# Patient Record
Sex: FEMALE | Race: WHITE | NOT HISPANIC OR LATINO | Employment: STUDENT | URBAN - METROPOLITAN AREA
[De-identification: names, ages, dates, MRNs, and addresses within clinical notes are randomized per-mention and may not be internally consistent; named-entity substitution may affect disease eponyms.]

---

## 2018-04-16 ENCOUNTER — TELEPHONE (OUTPATIENT)
Dept: ALLERGY | Facility: CLINIC | Age: 28
End: 2018-04-16

## 2018-04-16 ENCOUNTER — OFFICE VISIT (OUTPATIENT)
Dept: FAMILY MEDICINE | Facility: CLINIC | Age: 28
End: 2018-04-16
Payer: COMMERCIAL

## 2018-04-16 ENCOUNTER — OFFICE VISIT (OUTPATIENT)
Dept: ALLERGY | Facility: CLINIC | Age: 28
End: 2018-04-16
Payer: COMMERCIAL

## 2018-04-16 VITALS
TEMPERATURE: 97.2 F | HEART RATE: 80 BPM | WEIGHT: 171.6 LBS | SYSTOLIC BLOOD PRESSURE: 104 MMHG | DIASTOLIC BLOOD PRESSURE: 74 MMHG | BODY MASS INDEX: 27.7 KG/M2

## 2018-04-16 VITALS
OXYGEN SATURATION: 100 % | TEMPERATURE: 98 F | HEIGHT: 67 IN | WEIGHT: 166.23 LBS | DIASTOLIC BLOOD PRESSURE: 70 MMHG | RESPIRATION RATE: 16 BRPM | HEART RATE: 86 BPM | SYSTOLIC BLOOD PRESSURE: 116 MMHG | BODY MASS INDEX: 26.09 KG/M2

## 2018-04-16 DIAGNOSIS — J01.90 ACUTE SINUSITIS WITH SYMPTOMS > 10 DAYS: ICD-10-CM

## 2018-04-16 DIAGNOSIS — H60.391 INFECTIVE OTITIS EXTERNA, RIGHT: ICD-10-CM

## 2018-04-16 DIAGNOSIS — J45.20 MILD INTERMITTENT ASTHMA WITHOUT COMPLICATION: Primary | ICD-10-CM

## 2018-04-16 DIAGNOSIS — Z91.040 LATEX SENSITIVITY: ICD-10-CM

## 2018-04-16 DIAGNOSIS — B37.31 CANDIDIASIS OF VAGINA: ICD-10-CM

## 2018-04-16 DIAGNOSIS — Z72.0 TOBACCO ABUSE: ICD-10-CM

## 2018-04-16 DIAGNOSIS — Z88.0 HISTORY OF PENICILLIN ALLERGY: ICD-10-CM

## 2018-04-16 DIAGNOSIS — J30.89 ALLERGIC RHINITIS DUE TO DUST MITE: ICD-10-CM

## 2018-04-16 DIAGNOSIS — J01.01 ACUTE RECURRENT MAXILLARY SINUSITIS: ICD-10-CM

## 2018-04-16 DIAGNOSIS — J30.1 CHRONIC SEASONAL ALLERGIC RHINITIS DUE TO POLLEN: ICD-10-CM

## 2018-04-16 DIAGNOSIS — L50.9 URTICARIA: ICD-10-CM

## 2018-04-16 DIAGNOSIS — H10.13 ALLERGIC CONJUNCTIVITIS, BILATERAL: ICD-10-CM

## 2018-04-16 DIAGNOSIS — J30.81 CHRONIC ALLERGIC RHINITIS DUE TO ANIMAL HAIR AND DANDER: ICD-10-CM

## 2018-04-16 DIAGNOSIS — J45.20 MILD INTERMITTENT ASTHMA WITHOUT COMPLICATION: ICD-10-CM

## 2018-04-16 DIAGNOSIS — J30.2 SEASONAL ALLERGIC RHINITIS, UNSPECIFIED CHRONICITY, UNSPECIFIED TRIGGER: ICD-10-CM

## 2018-04-16 DIAGNOSIS — T78.1XXA REACTION TO FOOD, INITIAL ENCOUNTER: ICD-10-CM

## 2018-04-16 DIAGNOSIS — H69.93 DYSFUNCTION OF BOTH EUSTACHIAN TUBES: Primary | ICD-10-CM

## 2018-04-16 LAB
FEF 25/75: NORMAL
FEV-1: NORMAL
FEV1/FVC: NORMAL
FVC: NORMAL

## 2018-04-16 PROCEDURE — 95004 PERQ TESTS W/ALRGNC XTRCS: CPT | Performed by: ALLERGY & IMMUNOLOGY

## 2018-04-16 PROCEDURE — 99214 OFFICE O/P EST MOD 30 MIN: CPT | Performed by: NURSE PRACTITIONER

## 2018-04-16 PROCEDURE — 94010 BREATHING CAPACITY TEST: CPT | Performed by: ALLERGY & IMMUNOLOGY

## 2018-04-16 PROCEDURE — 99244 OFF/OP CNSLTJ NEW/EST MOD 40: CPT | Mod: 25 | Performed by: ALLERGY & IMMUNOLOGY

## 2018-04-16 RX ORDER — DOXYCYCLINE 100 MG/1
100 CAPSULE ORAL 2 TIMES DAILY
Qty: 14 CAPSULE | Refills: 0 | Status: SHIPPED | OUTPATIENT
Start: 2018-04-16 | End: 2019-02-25

## 2018-04-16 RX ORDER — EPINEPHRINE 0.3 MG/.3ML
0.3 INJECTION SUBCUTANEOUS PRN
Qty: 0.6 ML | Refills: 2 | Status: SHIPPED | OUTPATIENT
Start: 2018-04-16 | End: 2018-04-16

## 2018-04-16 RX ORDER — COPPER 313.4 MG/1
1 INTRAUTERINE DEVICE INTRAUTERINE ONCE
Qty: 1 EACH | COMMUNITY
Start: 2018-04-16 | End: 2018-09-27

## 2018-04-16 RX ORDER — AZELASTINE HYDROCHLORIDE 0.5 MG/ML
1 SOLUTION/ DROPS OPHTHALMIC 2 TIMES DAILY PRN
Qty: 1 BOTTLE | Refills: 1 | Status: SHIPPED | OUTPATIENT
Start: 2018-04-16 | End: 2018-06-06

## 2018-04-16 RX ORDER — OFLOXACIN 3 MG/ML
10 SOLUTION AURICULAR (OTIC) DAILY
Qty: 4 ML | Refills: 0 | Status: SHIPPED | OUTPATIENT
Start: 2018-04-16 | End: 2019-02-25

## 2018-04-16 RX ORDER — OFLOXACIN 3 MG/ML
10 SOLUTION AURICULAR (OTIC) 2 TIMES DAILY
Qty: 10 ML | Refills: 0 | Status: SHIPPED | OUTPATIENT
Start: 2018-04-16 | End: 2018-04-16

## 2018-04-16 RX ORDER — ALBUTEROL SULFATE 90 UG/1
2 AEROSOL, METERED RESPIRATORY (INHALATION) EVERY 4 HOURS PRN
Qty: 1 INHALER | Refills: 0 | Status: SHIPPED | OUTPATIENT
Start: 2018-04-16 | End: 2018-06-06

## 2018-04-16 RX ORDER — LORATADINE 10 MG/1
10 TABLET ORAL DAILY PRN
Qty: 30 TABLET | Refills: 3 | Status: SHIPPED | OUTPATIENT
Start: 2018-04-16 | End: 2020-06-30 | Stop reason: ALTCHOICE

## 2018-04-16 RX ORDER — FLUCONAZOLE 150 MG/1
150 TABLET ORAL ONCE
Qty: 1 TABLET | Refills: 0 | Status: SHIPPED | OUTPATIENT
Start: 2018-04-16 | End: 2018-04-16

## 2018-04-16 RX ORDER — EPINEPHRINE 0.3 MG/.3ML
0.3 INJECTION SUBCUTANEOUS PRN
Qty: 0.6 ML | Refills: 1 | Status: SHIPPED | OUTPATIENT
Start: 2018-04-16 | End: 2021-11-02

## 2018-04-16 ASSESSMENT — ENCOUNTER SYMPTOMS
COUGH: 1
CHILLS: 1
HEADACHES: 1
ARTHRALGIAS: 0
ACTIVITY CHANGE: 0
ADENOPATHY: 0
EYE REDNESS: 1
EYE ITCHING: 1
NAUSEA: 1
FEVER: 1
WHEEZING: 1
JOINT SWELLING: 1
DIARRHEA: 0
RHINORRHEA: 1
CHEST TIGHTNESS: 0
SHORTNESS OF BREATH: 1
VOMITING: 0
FACIAL SWELLING: 1
SINUS PRESSURE: 1
MYALGIAS: 1
EYE DISCHARGE: 1

## 2018-04-16 NOTE — MR AVS SNAPSHOT
After Visit Summary   4/16/2018    hSerley Quijano    MRN: [id number]           Patient Information     Date Of Birth          1990        Visit Information        Provider Department      4/16/2018 12:00 PM Nj Rosenberg MD [facility]        Today's Diagnoses     Mild intermittent asthma without complication    -  1    Chronic seasonal allergic rhinitis due to pollen        Chronic allergic rhinitis due to animal hair and dander        Allergic rhinitis due to dust mite        Allergic conjunctivitis, bilateral        Urticaria        Latex sensitivity        Reaction to food, initial encounter        Infective otitis externa, right        History of penicillin allergy        Acute recurrent maxillary sinusitis          Care Instructions     Get the blood work done.  -Start Arnuity 100 mcg 1 puff once daily.  -Start using albuterol inhaler 2-4 puffs every 4 hours as needed for persistent cough/chest tightness/wheezing/shortness of breath. Use it with chamber device.  -Start Qnasl 80 mcg 2 puffs in each nostril once daily.  -Start Optivar 1 drop in each eye twice daily as needed.  -Start Floxin eardrops 10 drops in the right ear once daily for 7 days.  -Start antibiotics as prescribed.  -if you have hives that are not well controlled, you may take Claritin 10 mg by mouth daily as needed.    The time elapsed since the last reaction is important because penicillin-specific IgE antibodies decrease over time, and therefore patients with recent reactions are more likely to be allergic than patients with distant reactions. Approximately 80 percent of patients with IgE-mediated penicillin allergy loose the sensitivity after 10 years.  -Recommend penicillin testing with subsequent amoxicillin oral challenge in office settings if the test is negative.    AEROALLERGEN AVOIDANCE INSTRUCTIONS  MOLD  Indoors, mold season is year round. Outdoors, most mold prefer seasons with high humidity. Mold  prefers damp, dark, warm places. Here are some tips on how to avoid mold exposure.  1.  Keep humidity inside between 35-50% with air conditioning or dehumidifier. The humidity level can be checked with a meter from a hardware store.   2.  Clean surfaces where mold grows and dry wet areas.  3.  Avoid steam cleaning carpets and discard moldy belongings.  4.  Wear a mask when doing yard work and refrain from walking through uncut fields or playing in leaves.  5.  Minimize use of potted plants and do not keep them indoors.  6.  Consider an allergy cover for the pillow and mattress.  POLLEN  Pollens are the tiny airborne particles given off by trees, weeds, and grasses. They can be the cause of seasonal allergic rhinitis or hay fever symptoms, which include stuffy, itchy, runny nose, redness, swelling and itching of the eyes, and itching of the ears and throat. Here are some tips on how to avoid pollen exposure.  1. .Keep windows closed and use the air conditioner when possible.  2.  Avoid outside exposure in the early morning as pollen counts are highest at that time.  3.  Take a shower and wash hair each night.  4.  Consider wearing a mask when working in the yard and/or garden.  5.  Clean furnace filter monthly with HEPA filters. Consider a HEPA filter vacuum  which will prevent pollen from being reintroduced into the air.   DUST MITES  Dust mites can never be entirely eliminated in the house no matter how clean your house is. Dust mites are attracted to warm, moist areas and feed on dead skin flakes. Here are tips to minimize dust mites in your home.  1.  Encase pillows and mattress/box springs in zippered allergy covers.  2.  Wash bedding in hot water (at least 130 F) every 7-14 days.  3.  Avoid curtains, carpet, and upholstered furniture if possible.  4.  Use HEPA air filters and a HEPA filter vacuum . Change filters monthly. Vacuum weekly.  5.  Keep bedroom simple, avoiding clutter, so it can quickly  be dusted.  6.  Cover heating vents with vent filters.  7.  Keep stuffed toys in a closed container and wash or freeze regularly.  8.  Keep clothing in the closet with the door closed.  PETS  Pets present many problems for people with allergies. Dander from pets is very difficult to remove and also is a food source for dust mites.  1.  If possible, find the pet a new home.  2.  If not possible, keep the pet outdoors. Never allow the pet into the bedroom.  3.  Wash pet weekly in warm water.  4.  Encase mattresses, pillows, and box springs in allergen-proof covers.  5.  Use HEPA air filters and a HEPA filter vacuum . Change filters monthly.        Using an Inhaler with a Spacer  To control asthma, you need to use your medicines the right way. Some medicines are inhaled using a device called a metered-dose inhaler (MDI). Metered-dose inhalers deliver medicine with a fine spray. You may be asked to use a spacer (holding tube) with your inhaler. The spacer helps make sure all the medicine you need goes into your lungs.   Steps for using an inhaler with a spacer  Step 1:    Remove the caps from the inhaler and spacer.    Shake the inhaler well and attach the spacer. If the inhaler is being used for the first time or has not been used for a while, prime it as directed by the product maker.  Step 2:    Breathe out normally.    Put the spacer between your teeth. Close your lips tightly around it.    Keep your chin up.  Step 3:    Spray 1 puff into the spacer by pressing down on the inhaler.    Then breathe in through your mouth as slowly and deeply as you can. This should take about 5-10 seconds. If you breathe too quickly, you may hear a whistling sound in certain spacers.  Step 4:    Take the spacer out of your mouth.    Hold your breath for a count of 10.    Then hold your lips together and slowly breathe out through your mouth.          If you re prescribed more than 1 puff of medicine at a time, wait at least 30  seconds between puffs. This number may be different for different medicines. Shake the inhaler again. Then repeat steps 2 to 4.   Date Last Reviewed: 10/1/2016    3405-6514 [employer], [employer]. [address] [place] [address], [place] [address]. All rights reserved. This information is not intended as a substitute for professional medical care. Always follow your healthcare professional's instructions.      Anaphylaxis Action Plan for Immunotherapy Patients    There is risk of systemic reactions when receiving immunotherapy injections. Local reactions such as a wheal (hive) smaller than a quarter, redness, swelling, and soreness are common. If the wheal is greater than the size of a half dollar (3.4 cm) please contact our clinic (numbers below), as we will need to adjust the dose that you receive at your next injection. Waiting until the next appointment to inform us of the reaction could increase the wait time that you experience, because your allergist will need to be contacted for new orders prior to giving the injection.  If you have symptoms not localized to the injection site, please follow the anaphylaxis plan, and contact our office to update after you have received emergency medical treatment. Please ask to speak to an Allergy RN with any questions or updates.     [facility]: [phone]  [facility]: [phone]  [facility]: [phone]  [place]: [phone]  [phone]                Follow-ups after your visit        Follow-up notes from your care team     Return in about 6 weeks (around 2018), or if symptoms worsen or fail to improve, for rhinitis follow up, asthma follow up, food allergy follow up, hives follow up.      Your next 10 appointments already scheduled     2018 12:15 PM CDT   New Visit with Rogers Nevarez MD   [facility] ([facility])    [address] [id number] [phone]              Future tests  "that were ordered for you today     Open Future Orders        Priority Expected Expires Ordered    Allergen horse dander IgE Routine  4/16/2019 4/16/2018    Allergen dog epithelium IgE Routine  4/16/2019 4/16/2018    Allergen latex IgE Routine  4/16/2019 4/16/2018    Allergen Food Pineapple IgE Routine  4/16/2019 4/16/2018    Allergen Eggplant IgE Routine  4/16/2019 4/16/2018    Allergen Food Kiwi IgE Routine  4/16/2019 4/16/2018            Who to contact     If you have questions or need follow up information about today's clinic visit or your schedule please contact [facility] directly at [phone].  Normal or non-critical lab and imaging results will be communicated to you by MyChart, letter or phone within 4 business days after the clinic has received the results. If you do not hear from us within 7 days, please contact the clinic through [employer] or phone. If you have a critical or abnormal lab result, we will notify you by phone as soon as possible.  Submit refill requests through [employer] or call your pharmacy and they will forward the refill request to us. Please allow 3 business days for your refill to be completed.          Additional Information About Your Visit        [employer] Information     [employer] gives you secure access to your electronic health record. If you see a primary care provider, you can also send messages to your care team and make appointments. If you have questions, please call your primary care clinic.  If you do not have a primary care provider, please call [phone] and they will assist you.        Care EveryWhere ID     This is your Care EveryWhere ID. This could be used by other organizations to access your [place] medical records  [id number]        Your Vitals Were     Pulse Temperature Respirations Height Pulse Oximetry BMI (Body Mass Index)    86 98  F (36.7  C) (Oral) 16 1.69 m (5' 6.53\") 100% 26.4 kg/m2       Blood Pressure from Last 3 Encounters:   04/16/18 " 116/70   04/16/18 104/74   10/18/16 113/66    Weight from Last 3 Encounters:   04/16/18 75.4 kg (166 lb 3.6 oz)   04/16/18 77.8 kg (171 lb 9.6 oz)   10/18/16 80.9 kg (178 lb 6.4 oz)              We Performed the Following     ALLERGY SKIN TESTS,ALLERGENS     OPTICHAMBER     Spirometry, Breathing Capacity          Today's Medication Changes          These changes are accurate as of 4/16/18  1:56 PM.  If you have any questions, ask your nurse or doctor.               Start taking these medicines.        Dose/Directions    azelastine 0.05 % Soln ophthalmic solution   Commonly known as:  OPTIVAR   Used for:  Allergic conjunctivitis, bilateral   Started by:  Nj Rosenberg MD        Dose:  1 drop   Apply 1 drop to eye 2 times daily as needed   Quantity:  1 Bottle   Refills:  1       Beclomethasone Dipropionate 80 MCG/ACT Aers Nasal Spray   Commonly known as:  QNASL   Used for:  Chronic seasonal allergic rhinitis due to pollen, Chronic allergic rhinitis due to animal hair and dander, Allergic rhinitis due to dust mite   Started by:  Nj Rosenberg MD        Dose:  2 spray   Spray 2 sprays into both nostrils daily   Quantity:  8.7 g   Refills:  1       doxycycline monohydrate 100 MG capsule   Used for:  Acute sinusitis with symptoms > 10 days   Started by:  Cyndee Rizzo APRN CNP        Dose:  100 mg   Take 1 capsule (100 mg) by mouth 2 times daily for 7 days   Quantity:  14 capsule   Refills:  0       EPINEPHrine 0.3 MG/0.3ML injection 2-pack   Commonly known as:  AUVI-Q   Used for:  Chronic allergic rhinitis due to animal hair and dander, Chronic seasonal allergic rhinitis due to pollen, Mild intermittent asthma without complication, Latex sensitivity, Reaction to food, initial encounter   Started by:  Nj Rosenberg MD        Dose:  0.3 mg   Inject 0.3 mLs (0.3 mg) into the muscle as needed for anaphylaxis   Quantity:  0.6 mL   Refills:  1       fluticasone furoate 100 MCG/ACT Aepb inhalation powder    Commonly known as:  ARNUITY ELLIPTA   Used for:  Mild intermittent asthma without complication   Started by:  Nj Rosenberg MD        Dose:  1 puff   Inhale 1 puff into the lungs daily   Quantity:  1 each   Refills:  3       ofloxacin 0.3 % otic solution   Commonly known as:  FLOXIN   Used for:  Infective otitis externa, right   Started by:  Nj Rosenberg MD        Dose:  10 drop   Place 10 drops into the right ear daily for 7 days   Quantity:  4 mL   Refills:  0         These medicines have changed or have updated prescriptions.        Dose/Directions    loratadine 10 MG tablet   Commonly known as:  CLARITIN   This may have changed:    - when to take this  - reasons to take this   Used for:  Urticaria   Changed by:  Nj Rosenberg MD        Dose:  10 mg   Take 1 tablet (10 mg) by mouth daily as needed for allergies   Quantity:  30 tablet   Refills:  3       * PROAIR  (90 Base) MCG/ACT Inhaler   This may have changed:  Another medication with the same name was added. Make sure you understand how and when to take each.   Used for:  Mild intermittent asthma   Generic drug:  albuterol   Changed by:  Nj Rosenberg MD        Dose:  2 puff   Inhale 2 puffs into the lungs 4 times daily as needed.   Quantity:  3 Inhaler   Refills:  3       * albuterol 1.25 MG/3ML nebulizer solution   Commonly known as:  ACCUNEB   This may have changed:  Another medication with the same name was added. Make sure you understand how and when to take each.   Used for:  Asthma with acute exacerbation   Changed by:  jN Rosenberg MD        Dose:  1 ampule   Take 3 mLs by nebulization every 4 hours as needed for shortness of breath / dyspnea.   Quantity:  1 Box   Refills:  3       * albuterol 108 (90 Base) MCG/ACT Inhaler   Commonly known as:  PROAIR HFA/PROVENTIL HFA/VENTOLIN HFA   This may have changed:  You were already taking a medication with the same name, and this prescription was added. Make sure you understand how and  when to take each.   Used for:  Mild intermittent asthma without complication   Changed by:  Nj Rosenberg MD        Dose:  2 puff   Inhale 2 puffs into the lungs every 4 hours as needed for shortness of breath / dyspnea or wheezing   Quantity:  1 Inhaler   Refills:  0       * Notice:  This list has 3 medication(s) that are the same as other medications prescribed for you. Read the directions carefully, and ask your doctor or other care provider to review them with you.      Stop taking these medicines if you haven't already. Please contact your care team if you have questions.     [place] 610 MG Caps   Stopped by:  Nj Rosenberg MD           metoclopramide 10 MG tablet   Commonly known as:  REGLAN   Stopped by:  Cyndee Rizzo APRN CNP           mometasone 0.1 % cream   Commonly known as:  ELOCON   Stopped by:  Cyndee Rizzo APRN CNP           Prenatal Vitamins 28-0.8 MG Tabs   Stopped by:  Cyndee Rizzo APRN CNP                Where to get your medicines      These medications were sent to [facility], [place] [address]     Phone:  [phone]     EPINEPHrine 0.3 MG/0.3ML injection 2-pack         These medications were sent to [facility] [address] [facility]  [address] [facility] [address]     Phone:  [phone]     loratadine 10 MG tablet         These medications were sent to [facility] [address] [place] [address]     Phone:  [phone]     albuterol 108 (90 Base) MCG/ACT Inhaler    azelastine 0.05 % Soln ophthalmic solution    Beclomethasone Dipropionate 80 MCG/ACT Aers Nasal Spray    doxycycline monohydrate 100 MG capsule    fluticasone furoate 100 MCG/ACT Aepb inhalation powder    ofloxacin 0.3 % otic solution                Primary Care Provider Office Phone # Fax #    Mya Mcmahon,  [phone]       [address]  [facility] [address]        Equal Access to Services     JOEYVIRI JORDYN : Hadsanto aad ku hadchelitarobert Valente, wabalwinderda thijozefha, artista kadouglasda sean, kimi stephaniein hayaaerendira kapoorluis alberto reina laDavgregoria mendez. So [facility] [phone].    ATENCIÓN: Si habla español, tiene a roa disposición servicios gratuitos de asistencia lingüística. Melidaame al [phone].    We comply with applicable federal civil rights laws and [place] laws. We do not discriminate on the basis of race, color, national origin, age, disability, sex, sexual orientation, or gender identity.            Thank you!     Thank you for choosing [facility]  for your care. Our goal is always to provide you with excellent care. Hearing back from our patients is one way we can continue to improve our services. Please take a few minutes to complete the written survey that you may receive in the mail after your visit with us. Thank you!             Your Updated Medication List - Protect others around you: Learn how to safely use, store and throw away your medicines at www.disposemymeds.org.          This list is accurate as of 4/16/18  1:56 PM.  Always use your most recent med list.                   Brand Name Dispense Instructions for use Diagnosis    azelastine 0.05 % Soln ophthalmic solution    OPTIVAR    1 Bottle    Apply 1 drop to eye 2 times daily as needed    Allergic conjunctivitis, bilateral       Beclomethasone Dipropionate 80 MCG/ACT Aers Nasal Spray    QNASL    8.7 g    Spray 2 sprays into both nostrils daily    Chronic seasonal allergic rhinitis due to pollen, Chronic allergic rhinitis due to animal hair and dander, Allergic rhinitis due to dust mite       doxycycline monohydrate 100 MG capsule     14 capsule    Take 1 capsule (100 mg) by mouth 2 times daily for 7 days    Acute sinusitis with symptoms > 10 days       EPINEPHrine 0.3 MG/0.3ML injection 2-pack    AUVI-Q    0.6 mL    Inject 0.3 mLs (0.3 mg)  into the muscle as needed for anaphylaxis    Chronic allergic rhinitis due to animal hair and dander, Chronic seasonal allergic rhinitis due to pollen, Mild intermittent asthma without complication, Latex sensitivity, Reaction to food, initial encounter       fluticasone furoate 100 MCG/ACT Aepb inhalation powder    ARNUITY ELLIPTA    1 each    Inhale 1 puff into the lungs daily    Mild intermittent asthma without complication       loratadine 10 MG tablet    CLARITIN    30 tablet    Take 1 tablet (10 mg) by mouth daily as needed for allergies    Urticaria       ofloxacin 0.3 % otic solution    FLOXIN    4 mL    Place 10 drops into the right ear daily for 7 days    Infective otitis externa, right       paragard intrauterine copper     1 each    1 each by Intrauterine route once for 1 dose        * PROAIR  (90 Base) MCG/ACT Inhaler   Generic drug:  albuterol     3 Inhaler    Inhale 2 puffs into the lungs 4 times daily as needed.    Mild intermittent asthma       * albuterol 1.25 MG/3ML nebulizer solution    ACCUNEB    1 Box    Take 3 mLs by nebulization every 4 hours as needed for shortness of breath / dyspnea.    Asthma with acute exacerbation       * albuterol 108 (90 Base) MCG/ACT Inhaler    PROAIR HFA/PROVENTIL HFA/VENTOLIN HFA    1 Inhaler    Inhale 2 puffs into the lungs every 4 hours as needed for shortness of breath / dyspnea or wheezing    Mild intermittent asthma without complication       * Notice:  This list has 3 medication(s) that are the same as other medications prescribed for you. Read the directions carefully, and ask your doctor or other care provider to review them with you.

## 2018-04-16 NOTE — MR AVS SNAPSHOT
After Visit Summary   4/16/2018    Sherley Quijano    MRN: [id number]           Patient Information     Date Of Birth          1990        Visit Information        Provider Department      4/16/2018 9:20 AM Cyndee Rizzo APRN [facility]        Today's Diagnoses     Dysfunction of both eustachian tubes    -  1    Mild intermittent asthma without complication        Seasonal allergic rhinitis, unspecified chronicity, unspecified trigger        Acute sinusitis with symptoms > 10 days        Candidiasis of vagina          Care Instructions    ENT and asthma/allergy referrals made    Doxycycline sent to the pharmacy for symptoms    Diflucan as needed for yeast infection associated with antibiotic use    Follow up if symptoms do not improve or worsen.            Follow-ups after your visit        Additional Services     ALLERGY/ASTHMA ADULT REFERRAL       Your provider [place] you [place]: [facility]: [facility] [phone] [email or web address]    Please be aware that coverage of these services is subject to the terms and limitations of your health insurance plan.  Call member services at your health plan with any benefit or coverage questions.      Please bring the following with you to your appointment:    (1) Any X-Rays, CTs or MRIs which have been performed.  Contact the facility where they were done to arrange for  prior to your scheduled appointment.    (2) List of current medications  (3) This referral request   (4) Any documents/labs given to you for this referral            OTOLARYNGOLOGY REFERRAL       Your provider has referred you to: [facility] [place]: [facility] [phone]   [email or web address]    Please be aware that coverage of these services is subject to the terms and limitations of your health insurance plan.  Call member services at your health plan with any benefit or  coverage questions.      Please bring the following with you to your appointment:    (1) Any X-Rays, CTs or MRIs which have been performed.  Contact the facility where they were done to arrange for  prior to your scheduled appointment.   (2) List of current medications  (3) This referral request   (4) Any documents/labs given to you for this referral                  Who to contact     If you have questions or need follow up information about today's clinic visit or your schedule please contact [facility] directly at [phone].  Normal or non-critical lab and imaging results will be communicated to you by [employer], letter or phone within 4 business days after the clinic has received the results. If you do not hear from us within 7 days, please contact the clinic through [employer] or phone. If you have a critical or abnormal lab result, we will notify you by phone as soon as possible.  Submit refill requests through [employer] or call your pharmacy and they will forward the refill request to us. Please allow 3 business days for your refill to be completed.          Additional Information About Your Visit        [employer] Information     [employer] gives you secure access to your electronic health record. If you see a primary care provider, you can also send messages to your care team and make appointments. If you have questions, please call your primary care clinic.  If you do not have a primary care provider, please call [phone] and they will assist you.        Care EveryWhere ID     This is your Care EveryWhere ID. This could be used by other organizations to access your [place] medical records  [id number]        Your Vitals Were     Pulse Temperature BMI (Body Mass Index)             80 97.2  F (36.2  C) (Tympanic) 27.7 kg/m2          Blood Pressure from Last 3 Encounters:   04/16/18 104/74   10/18/16 113/66   09/22/16 119/70    Weight from Last 3 Encounters:   04/16/18 171 lb 9.6 oz  (77.8 kg)   10/18/16 178 lb 6.4 oz (80.9 kg)   09/22/16 176 lb 12.8 oz (80.2 kg)              We Performed the Following     ALLERGY/ASTHMA ADULT REFERRAL     OTOLARYNGOLOGY REFERRAL          Today's Medication Changes          These changes are accurate as of 4/16/18 10:00 AM.  If you have any questions, ask your nurse or doctor.               Start taking these medicines.        Dose/Directions    doxycycline monohydrate 100 MG capsule   Used for:  Acute sinusitis with symptoms > 10 days   Started by:  Cyndee Rizzo APRN CNP        Dose:  100 mg   Take 1 capsule (100 mg) by mouth 2 times daily for 7 days   Quantity:  14 capsule   Refills:  0       fluconazole 150 MG tablet   Commonly known as:  DIFLUCAN   Used for:  Candidiasis of vagina   Started by:  Cyndee Rizzo APRN CNP        Dose:  150 mg   Take 1 tablet (150 mg) by mouth once for 1 dose   Quantity:  1 tablet   Refills:  0         Stop taking these medicines if you haven't already. Please contact your care team if you have questions.     loratadine 10 MG tablet   Commonly known as:  CLARITIN   Stopped by:  Cyndee Rizzo APRN CNP           metoclopramide 10 MG tablet   Commonly known as:  REGLAN   Stopped by:  Cyndee Rizzo APRN CNP           mometasone 0.1 % cream   Commonly known as:  ELOCON   Stopped by:  Cyndee Rizzo APRN CNP           Prenatal Vitamins 28-0.8 MG Tabs   Stopped by:  Cyndee Rizzo APRN CNP                Where to get your medicines      These medications were sent to [address] [address]     Phone:  [phone]     doxycycline monohydrate 100 MG capsule    fluconazole 150 MG tablet                Primary Care Provider Office Phone # Fax #    Mya Mcmahon [phone] [address] [place] [address]        Equal Access to Services     CARLINE COBB AH: Fidencio christy  rei Victor, wabalwinderda luqadaha, qaybta kaalmada sean, kimi romoerendira vanessa. So [facility] [phone].    ATENCIÓN: Si maryellenla charlie, tiene a roa disposición servicios gratuitos de asistencia lingüística. Britta al [phone].    We comply with applicable federal civil rights laws and [place] laws. We do not discriminate on the basis of race, color, national origin, age, disability, sex, sexual orientation, or gender identity.            Thank you!     Thank you for choosing [facility]  for your care. Our goal is always to provide you with excellent care. Hearing back from our patients is one way we can continue to improve our services. Please take a few minutes to complete the written survey that you may receive in the mail after your visit with us. Thank you!             Your Updated Medication List - Protect others around you: Learn how to safely use, store and throw away your medicines at www.disposemymeds.org.          This list is accurate as of 4/16/18 10:00 AM.  Always use your most recent med list.                   Brand Name Dispense Instructions for use Diagnosis    doxycycline monohydrate 100 MG capsule     14 capsule    Take 1 capsule (100 mg) by mouth 2 times daily for 7 days    Acute sinusitis with symptoms > 10 days       Fenugreek 610 MG Caps      Take 3,660 mg by mouth 3 times daily        fluconazole 150 MG tablet    DIFLUCAN    1 tablet    Take 1 tablet (150 mg) by mouth once for 1 dose    Candidiasis of vagina       * PROAIR  (90 Base) MCG/ACT Inhaler   Generic drug:  albuterol     3 Inhaler    Inhale 2 puffs into the lungs 4 times daily as needed.    Mild intermittent asthma       * albuterol 1.25 MG/3ML nebulizer solution    ACCUNEB    1 Box    Take 3 mLs by nebulization every 4 hours as needed for shortness of breath / dyspnea.    Asthma with acute exacerbation       * Notice:  This list has 2 medication(s) that are the same as other  medications prescribed for you. Read the directions carefully, and ask your doctor or other care provider to review them with you.

## 2018-04-16 NOTE — NURSING NOTE
"Chief Complaint   Patient presents with     Ear Problem       Initial /74 (BP Location: Right arm, Patient Position: Sitting, Cuff Size: Adult Regular)  Pulse 80  Temp 97.2  F (36.2  C) (Tympanic)  Wt 171 lb 9.6 oz (77.8 kg)  BMI 27.7 kg/m2 Estimated body mass index is 27.7 kg/(m^2) as calculated from the following:    Height as of 9/22/16: 5' 6\" (1.676 m).    Weight as of this encounter: 171 lb 9.6 oz (77.8 kg).      Health Maintenance that is potentially due pending provider review:  NONE    n/a    Is there anyone who you would like to be able to receive your results? No  If yes have patient fill out ELIZA    Yu BELLO CMA    "

## 2018-04-16 NOTE — PROGRESS NOTES
SUBJECTIVE:                                                               Sherley Quijano presents today to our Allergy Clinic at [facility] ; she is being seen in consultation at the request of Cyndee Rizzo CNP.  She is a 27-year-old female with a history of asthma, eustachian tube dysfunction, and recurrent sinus infections.  Diagnosed with asthma at the age of 2 years, manifested by wheeze, SOB and chest tightness usually triggered by exertion. She states she used albuterol for an asthma flare ~ 5-6 years ago.  About 5 weeks ago, after being exposed to a viral respiratory infection, she started having dyspnea, wheezing, phlegm, and dry cough. Symptoms are mainly nocturnal, but she may develop them during the day as well. She doesn't use albuterol for above symptoms. States if she uses it at night, she can't sleep after that. Using albuterol historically was helpful.   For the last 4 weeks, she has been having chest symptoms almost every night.   She was hospitalized for asthma as a child x 1. She is not sure if she was in ICU. No ED/PCP/urgent care/other specialist visits for asthma flare for the last year. She stopped smoking cigarettes. Was smoking 1/2 ppd for 12 years. Currently, she is vaping.   She has never had CXR-confirmed pneumonia in her life.    Since childhood, she has been having perennial chronic nasal symptoms (itch, clear rhinorrhea, stuffiness, and sneezing) and ocular symptoms (itching and watering).  She is worse around dogs/cats.  She tried some nasal sprays (Flonase, Nasacort, Rhinocort, Nasonex, QVAR with nasal adapter), but she didn't like them, especially aqueous ones.    Claritin was partially helpful. Zyrtec and Benadryl are sedating for her.  There is no history of PE tubes, sinus surgeries, or T/A.    She usually has 2 sinus infections per year, mainly in Winter. All symptoms require oral antibiotics.  2-6 ear infection per year. All of the require abx as well.   She  was on allergen immunotherapy in the past with Dr. Donato Landa.  She stopped it because she moved to [place] and she used to develop LLRs. No history of anaphylaxis with IT.  Eggplant, Kiwi, green pine-apple causes anaphylaxis, manifested by throat closing sensation, generalized urticaria, tongue and lip swelling. She gets hives with latex exposure almost right away.  She has an epi, but she doesn't carry it with her.     With milk, she develops just diarrhea. Discussed lactose intolerance.    She has random urticaria without obvious triggers. Occur once a week. Self-resolve.    When she was 10 years old, she had urticaria and emesis after taking one of the meds from [facility]. She thinks it happened 1-2 hours after. She has been avoiding PCNs since then.    Patient Active Problem List   Diagnosis     Mild intermittent asthma     Dysfunction of eustachian tube     Attention deficit disorder     Bipolar affective disorder (H)     Adverse reaction to viral vaccines     CARDIOVASCULAR SCREENING; LDL GOAL LESS THAN 130     Migraine headache     Prenatal care, first pregnancy     SGA (small for gestational age)     Supervision of normal first pregnancy     IUGR (intrauterine growth restriction) affecting care of mother, third trimester, fetus 1     Vaginal delivery     Chronic seasonal allergic rhinitis due to pollen     Chronic allergic rhinitis due to animal hair and dander     Allergic rhinitis due to dust mite     Allergic conjunctivitis, bilateral       Past Medical History:   Diagnosis Date     Chickenpox      Chlamydia     age 18      Problem (# of Occurrences) Relation (Name,Age of Onset)    Alcohol/Drug (1) Father (Ludwin)    Allergies (2) Father (Ludwin), Sister (Paula)    Breast Cancer (2) Maternal Grandmother (Nae), Paternal Grandmother (Olivia)    C.A.D. (1) Maternal Grandfather: MI    Depression (2) Maternal Grandmother (Nae), Mother (Nettie)    HEART DISEASE (1) Maternal Grandfather    Neurologic  Disorder (1) Sister (Paula): Narcolepsy    Other - See Comments (1) Other (mat Grt Aunt): Heart Attack    Substance Abuse (3) Father (Ludwin): recovered alcohol, Mother (Nettie): recovered alcohol, Sister: recovered drugs        Past Surgical History:   Procedure Laterality Date     D & C  2006    TAB     MOUTH SURGERY  2008    wisdom teeth     Social History     Social History     Marital status:      Spouse name: N/A     Number of children: 0     Years of education: 12.5+     Occupational History      [employer]     Social History Main Topics     Smoking status: Former Smoker     Packs/day: 0.25     Types: Cigarettes     Quit date: 10/2017     Smokeless tobacco: Never Used      Comment: Needle using ecgis since october 2018     Alcohol use 0.0 oz/week     0 Standard drinks or equivalent per week      Comment: 1 glass wine every other day- told on 1/14/16 to stop alcohol use     Drug use: No     Sexual activity: Yes     Partners: Male     Other Topics Concern     Parent/Sibling W/ Cabg, Mi Or Angioplasty Before 65f 55m? No     Social History Narrative    April 16, 2018    ENVIRONMENTAL HISTORY: [place] The home is heated with a wood stove and radiant heat. They do not have central air conditioning. The patient's bedroom is furnished with hard sánchez in bedroom, allergen mattress cover, allergen pillowcase cover, fabric window coverings and 2 rugs.  Pets inside the house include 1 cat. There is  history of cockroach or mice infestation. There is 1 smoker in the house.  The house does not have a damp basement.               Review of Systems   Constitutional: Positive for chills and fever. Negative for activity change.   HENT: Positive for congestion, dental problem, ear pain, facial swelling (eyelid), postnasal drip, rhinorrhea, sinus pressure, sneezing and tinnitus. Negative for nosebleeds.    Eyes: Positive for  discharge, redness and itching.   Respiratory: Positive for cough, shortness of breath and wheezing. Negative for chest tightness.    Cardiovascular: Negative for chest pain.   Gastrointestinal: Positive for nausea. Negative for diarrhea and vomiting.   Musculoskeletal: Positive for joint swelling and myalgias. Negative for arthralgias.   Skin: Negative for rash.   Allergic/Immunologic: Positive for food allergies.   Neurological: Positive for headaches.   Hematological: Negative for adenopathy.   Psychiatric/Behavioral: Negative for behavioral problems and self-injury.           Current Outpatient Prescriptions:      doxycycline monohydrate 100 MG capsule, Take 1 capsule (100 mg) by mouth 2 times daily for 7 days, Disp: 14 capsule, Rfl: 0     paragard intrauterine copper, 1 each by Intrauterine route once for 1 dose, Disp: 1 each, Rfl:      albuterol (PROAIR HFA/PROVENTIL HFA/VENTOLIN HFA) 108 (90 Base) MCG/ACT Inhaler, Inhale 2 puffs into the lungs every 4 hours as needed for shortness of breath / dyspnea or wheezing, Disp: 1 Inhaler, Rfl: 0     fluticasone furoate (ARNUITY ELLIPTA) 100 MCG/ACT AEPB inhalation powder, Inhale 1 puff into the lungs daily, Disp: 1 each, Rfl: 3     azelastine (OPTIVAR) 0.05 % SOLN ophthalmic solution, Apply 1 drop to eye 2 times daily as needed, Disp: 1 Bottle, Rfl: 1     Beclomethasone Dipropionate (QNASL) 80 MCG/ACT AERS Nasal Spray, Spray 2 sprays into both nostrils daily, Disp: 8.7 g, Rfl: 1     ofloxacin (FLOXIN) 0.3 % otic solution, Place 10 drops into the right ear daily for 7 days, Disp: 4 mL, Rfl: 0     EPINEPHrine (AUVI-Q) 0.3 MG/0.3ML injection 2-pack, Inject 0.3 mLs (0.3 mg) into the muscle as needed for anaphylaxis, Disp: 0.6 mL, Rfl: 1     loratadine (CLARITIN) 10 MG tablet, Take 1 tablet (10 mg) by mouth daily as needed for allergies, Disp: 30 tablet, Rfl: 3     albuterol (ACCUNEB) 1.25 MG/3ML nebulizer solution, Take 3 mLs by nebulization every 4 hours as needed for  "shortness of breath / dyspnea. (Patient not taking: Reported on 4/16/2018), Disp: 1 Box, Rfl: 3     Albuterol Sulfate (PROAIR HFA) 108 (90 BASE) MCG/ACT AERS, Inhale 2 puffs into the lungs 4 times daily as needed. (Patient not taking: Reported on 4/16/2018), Disp: 3 Inhaler, Rfl: 3  Immunization History   Administered Date(s) Administered     DTAP (<7y) (Quadracel) 01/03/1991, 04/18/1991, 07/01/1991, 09/15/1992, 05/06/1996     HEPA 03/30/2007, 02/12/2008     HPV 03/30/2007, 02/12/2008     HepB 07/01/2003, 09/25/2003, 09/03/2004     Influenza (IIV3) PF 02/12/2008     MMR 09/15/1992, 07/01/2003     OPV, trivalent, live 01/03/1991, 04/18/1991, 09/15/1992, 05/06/1996     TD (ADULT, 7+) 07/01/2003     TDAP Vaccine (Adacel) 05/31/2011, 06/15/2016     Varicella Pt Report Hx of Varicella/Chicken Pox 04/01/1998     Allergies   Allergen Reactions     Kiwi Anaphylaxis     Throat swelling, difficulty breathing     Lanolin Hives     Latex Hives     Penicillins Rash     OBJECTIVE:                                                                 /70 (BP Location: Left arm, Patient Position: Sitting, Cuff Size: Adult Regular)  Pulse 86  Temp 98  F (36.7  C) (Oral)  Resp 16  Ht 1.69 m (5' 6.53\")  Wt 75.4 kg (166 lb 3.6 oz)  SpO2 100%  BMI 26.4 kg/m2        Physical Exam   Constitutional: She is oriented to person, place, and time. No distress.   HENT:   Head: Normocephalic and atraumatic.   Right Ear: External ear normal.   Left Ear: External ear and ear canal normal.   Nose: Mucosal edema present. No rhinorrhea. Right sinus exhibits no maxillary sinus tenderness and no frontal sinus tenderness. Left sinus exhibits maxillary sinus tenderness. Left sinus exhibits no frontal sinus tenderness.   Mouth/Throat: Oropharynx is clear and moist and mucous membranes are normal.   Erythematous and edematous right external ear canal with yellow debris.  Right tympanic membrane with mild yellowish effusion.  Left tympanic membrane " with clear effusion.    enlarged inferior nasal turbinates and bluish discoloration of nasal mucosa   Eyes: Conjunctivae are normal. Right eye exhibits no discharge. Left eye exhibits no discharge.   Neck: Normal range of motion.   Cardiovascular: Normal rate, regular rhythm and normal heart sounds.    No murmur heard.  Pulmonary/Chest: Effort normal and breath sounds normal. No respiratory distress. She has no wheezes. She has no rales.   Musculoskeletal: Normal range of motion.   Lymphadenopathy:     She has no cervical adenopathy.   Neurological: She is alert and oriented to person, place, and time.   Skin: Skin is warm. No rash noted. She is not diaphoretic.   Psychiatric: Mood, memory and affect normal.   Nursing note and vitals reviewed.      WORKUP:   SPIROMETRY       FVC 4.76L (114% of predicted).     FEV1 3.93L (111% of predicted).     FEV1/FVC 83%     FEF 25%-75%  4.16L/s (106% of predicted)    The office spirometry performed today doesn't suggest an obstruction.      Asthma Control Test (ACT) total score: 20     Percutaneous skin puncture testing for aeroallergens performed today (April 16, 2018) showed sensitivity to cat, dust mite (Dermatophagoides Farinae), tree pollen (Maple/Box Elder and oak), weed pollen (cocklebur, ragweed mix) and molds (Penicillium, Curvularia, Epicoccum, Aspergillus, Alternaria and Phoma).   The rest was negative with appropriate responses to positive and negative controls.      ASSESSMENT/PLAN:    Problem List Items Addressed This Visit        Respiratory    1. Mild intermittent asthma - Primary  Despite her ACT score of 20 and benign office spirometry, the patient states that she has symptoms almost daily.  -Start Arnuity Ellipta 100 mcg 1 puff once daily.  --Start using albuterol inhaler 2-4 puffs every 4 hours as needed for persistent cough/chest tightness/wheezing/shortness of breath. Use it with chamber device.    Relevant Medications    albuterol (PROAIR HFA/PROVENTIL  HFA/VENTOLIN HFA) 108 (90 Base) MCG/ACT Inhaler    fluticasone furoate (ARNUITY ELLIPTA) 100 MCG/ACT AEPB inhalation powder                Other Relevant Orders    Spirometry, Breathing Capacity (Completed)    ALLERGY SKIN TESTS,ALLERGENS (Completed)    OPTICHAMBER (Completed)    2. Chronic seasonal allergic rhinitis due to pollen  Avoidance measures discussed and information provided.  -Start Qnasl 80 mcg 2 puffs in each nostril once daily.  -She may take loratadine 10 mg by mouth daily as needed.  Taking cetirizine and diphenhydramine makes he sedated.  The patient states that she is almost sure that she is allergic to [place].  Skin test was negative.  Ordered serum IgE.  If symptoms persist despite medications and allergen avoidance, or if medications are not tolerated, allergen immunotherapy is recommended. Discussed briefly about allergen immunotherapy today.  The patient is interested.  She wants to call her insurance company to find out about coverage.    Relevant Medications            Beclomethasone Dipropionate (QNASL) 80 MCG/ACT AERS Nasal Spray    EPINEPHrine (AUVI-Q) 0.3 MG/0.3ML injection 2-pack    loratadine (CLARITIN) 10 MG tablet    Other Relevant Orders    Allergen white pine IgE (Completed)    3. Chronic allergic rhinitis due to animal hair and dander   The patient is sure that she is allergic to dogs and horses as well.  Skin test was negative.  -Ordered serum IgE.    Relevant Medications            Beclomethasone Dipropionate (QNASL) 80 MCG/ACT AERS Nasal Spray    EPINEPHrine (AUVI-Q) 0.3 MG/0.3ML injection 2-pack    loratadine (CLARITIN) 10 MG tablet    Other Relevant Orders    Allergen horse dander IgE    Allergen dog epithelium IgE    4. Allergic rhinitis due to dust mite    Relevant Medications            Beclomethasone Dipropionate (QNASL) 80 MCG/ACT AERS Nasal Spray    loratadine (CLARITIN) 10 MG tablet       Infectious/Inflammatory    5. Allergic conjunctivitis, bilateral  -Use  Optivar 1 drop in each eye twice daily as needed.      Relevant Medications    azelastine (OPTIVAR) 0.05 % SOLN ophthalmic solution      Other Visit Diagnoses     6. Urticaria      -Ordered latex IgE.  Strict avoidance suggested.  Occurs without any obvious triggers, and then self resolves.  It happens approximately once a week.  -She may take loratadine on as needed basis if symptoms are more persistent.      Relevant Medications                        loratadine (CLARITIN) 10 MG tablet    7. Latex sensitivity      Latex IgE was ordered.  Strict avoidance of latex is suggested.  -Suggest to carry injectable epinephrine with her all the time.      Relevant Medications                EPINEPHrine (AUVI-Q) 0.3 MG/0.3ML injection 2-pack    loratadine (CLARITIN) 10 MG tablet    Other Relevant Orders    Allergen latex IgE    8. Reaction to food, initial encounter     Sensitivity to kiwi seems to be secondary to cross-reactivity with latex.  Not sure about pineapple and eggplant.  -Ordered serum IgE for pineapple, eggplant and kiwi.  -Continue strict avoidance.  Anaphylaxis action plan was reviewed and provided.    Relevant Medications                EPINEPHrine (AUVI-Q) 0.3 MG/0.3ML injection 2-pack    loratadine (CLARITIN) 10 MG tablet    Other Relevant Orders    Allergen Food Pineapple IgE    Allergen Eggplant IgE    Allergen Food Kiwi IgE    9. Infective otitis externa, right      Contact dermatitis versus otitis externa.  She does have some yellow effusion behind the TM.  -Start Floxin.  The patient has  an evaluation with ENT in April.    Relevant Medications        ofloxacin (FLOXIN) 0.3 % otic solution        10. History of penicillin allergy     The time elapsed since the last reaction is important because penicillin-specific IgE antibodies decrease over time, and therefore patients with recent reactions are more likely to be allergic than patients with distant reactions. Approximately 80 percent of patients with  IgE-mediated penicillin allergy loose the sensitivity after 10 years.  -Recommend penicillin testing with subsequent amoxicillin oral challenge in office settings if the test is negative. She will think about it.      11. Acute recurrent maxillary sinusitis     She will start doxycycline as suggested by referring physician.  -Depending on symptom control, consider sinus CT.    12. Tobacco abuse    I believe that radha use of vaporizers to her nasal/ chest symptoms.  -Cessation was strongly encouraged.     I spent 80 minutes for this visit, with at least 45 minutes face-to-face counseling about diagnosis and treatment of asthma, allergic rhinoconjunctivitis, food allergies, sinusitis management, effect of passive smoking on children (her), and natural history of penicillin allergy.    Follow up in 6 weeks or sooner if needed.    Thank you for allowing us to participate in the care of this patient. Please feel free to contact us if there are any questions or concerns about the patient.    Disclaimer: This note consists of symbols derived from keyboarding, dictation and/or voice recognition software. As a result, there may be errors in the script that have gone undetected. Please consider this when interpreting information found in this chart.    Nj Rosenberg MD, FACAAI  Allergy, Asthma and Immunology  [place], [place] and [place]

## 2018-04-16 NOTE — NURSING NOTE
Per provider verbal order, RN placed Adult Environmental Panel and Horse scratch testing panels.  Consent was obtained prior to procedure.  Once panels were placed, patient was monitored for 15 minutes in clinic.  RN read test after 15 minutes and provider was notified of results.  Pt tolerated procedure well.  All questions and concerns were addressed at office visit.     Mikki WHEELER RN

## 2018-04-16 NOTE — LETTER
My Asthma Action Plan  Name: Sherley Quijano   YOB: 1990  Date: 4/16/2018   My doctor: MASHA Orellana CNP   My clinic: [facility]        My Control Medicine: None  My Rescue Medicine: Albuterol (Proair/Ventolin/Proventil) inhaler 2 puffs in to the lungs as needed for shortness of breath   My Asthma Severity: intermittent  Avoid your asthma triggers: Patient is unaware of triggers               GREEN ZONE   Good Control    I feel good    No cough or wheeze    Can work, sleep and play without asthma symptoms       Take your asthma control medicine every day.     1. If exercise triggers your asthma, take your rescue medication    15 minutes before exercise or sports, and    During exercise if you have asthma symptoms  2. Spacer to use with inhaler: If you have a spacer, make sure to use it with your inhaler             YELLOW ZONE Getting Worse  I have ANY of these:    I do not feel good    Cough or wheeze    Chest feels tight    Wake up at night   1. Keep taking your Green Zone medications  2. Start taking your rescue medicine:    every 20 minutes for up to 1 hour. Then every 4 hours for 24-48 hours.  3. If you stay in the Yellow Zone for more than 12-24 hours, contact your doctor.  4. If you do not return to the Green Zone in 12-24 hours or you get worse, start taking your oral steroid medicine if prescribed by your provider.           RED ZONE Medical Alert - Get Help  I have ANY of these:    I feel awful    Medicine is not helping    Breathing getting harder    Trouble walking or talking    Nose opens wide to breathe       1. Take your rescue medicine NOW  2. If your provider has prescribed an oral steroid medicine, start taking it NOW  3. Call your doctor NOW  4. If you are still in the Red Zone after 20 minutes and you have not reached your doctor:    Take your rescue medicine again and    Call 911 or go to the emergency room right away    See your regular doctor within 2  weeks of an Emergency Room or Urgent Care visit for follow-up treatment.          Annual Reminders:  Meet with Asthma Educator,  Flu Shot in the Fall, consider Pneumonia Vaccination for patients with asthma (aged 19 and older).    Pharmacy[facility]    [id number] - [place], [place] - [address] [facility] [address] [address] [employer] - [address]  [facility] [address] [address] [facility] [address]                      Asthma Triggers  How To Control Things That Make Your Asthma Worse    Triggers are things that make your asthma worse.  Look at the list below to help you find your triggers and what you can do about them.  You can help prevent asthma flare-ups by staying away from your triggers.      Trigger                                                          What you can do   Cigarette Smoke  Tobacco smoke can make asthma worse. Do not allow smoking in your home, car or around you.  Be sure no one smokes at a child s day care or school.  If you smoke, ask your health care provider for ways to help you quit.  Ask family members to quit too.  Ask your health care provider for a referral to Quit Plan to help you quit smoking, or call 1-901-778-PLAN.     Colds, Flu, Bronchitis  These are common triggers of asthma. Wash your hands often.  Don t touch your eyes, nose or mouth.  Get a flu shot every year.     Dust Mites  These are tiny bugs that live in cloth or carpet. They are too small to see. Wash sheets and blankets in hot water every week.   Encase pillows and mattress in dust mite proof covers.  Avoid having carpet if you can. If you have carpet, vacuum weekly.   Use a dust mask and HEPA vacuum.   Pollen and Outdoor Mold  Some people are allergic to trees, grass, or weed pollen, or molds. Try to keep your windows closed.  Limit time out doors when pollen count is high.   Ask you health care provider about taking medicine during allergy season.      Animal Dander  Some people are allergic to skin flakes, urine or saliva from pets with fur or feathers. Keep pets with fur or feathers out of your home.    If you can t keep the pet outdoors, then keep the pet out of your bedroom.  Keep the bedroom door closed.  Keep pets off cloth furniture and away from stuffed toys.     Mice, Rats, and Cockroaches  Some people are allergic to the waste from these pests.   Cover food and garbage.  Clean up spills and food crumbs.  Store grease in the refrigerator.   Keep food out of the bedroom.   Indoor Mold  This can be a trigger if your home has high moisture. Fix leaking faucets, pipes, or other sources of water.   Clean moldy surfaces.  Dehumidify basement if it is damp and smelly.   Smoke, Strong Odors, and Sprays  These can reduce air quality. Stay away from strong odors and sprays, such as perfume, powder, hair spray, paints, smoke incense, paint, cleaning products, candles and new carpet.   Exercise or Sports  Some people with asthma have this trigger. Be active!  Ask your doctor about taking medicine before sports or exercise to prevent symptoms.    Warm up for 5-10 minutes before and after sports or exercise.     Other Triggers of Asthma  Cold air:  Cover your nose and mouth with a scarf.  Sometimes laughing or crying can be a trigger.  Some medicines and food can trigger asthma.

## 2018-04-16 NOTE — TELEPHONE ENCOUNTER
Auvi-q not available through pharmacy, pt would like a regular epi-pen, please send a new order.      Thank You!  Maile Doan  [facility]  P: [phone] F:[phone]

## 2018-04-16 NOTE — LETTER
4/16/2018         RE: Sherley Quijano  [address] [place] [id number]        Dear Colleague,    Thank you for referring your patient, Sherley Quijano, to the [facility]. Please see a copy of my visit note below.    SUBJECTIVE:                                                               Sherley Quijano presents today to our Allergy Clinic at [facility] ; she is being seen in consultation at the request of Cyndee Rizzo CNP.  She is a 27-year-old female with a history of asthma, eustachian tube dysfunction, and recurrent sinus infections.  Diagnosed with asthma at the age of 2 years, manifested by wheeze, SOB and chest tightness usually triggered by exertion. She states she used albuterol for an asthma flare ~ 5-6 years ago.  About 5 weeks ago, after being exposed to a viral respiratory infection, she started having dyspnea, wheezing, phlegm, and dry cough. Symptoms are mainly nocturnal, but she may develop them during the day as well. She doesn't use albuterol for above symptoms. States if she uses it at night, she can't sleep after that. Using albuterol historically was helpful.   For the last 4 weeks, she has been having chest symptoms almost every night.   She was hospitalized for asthma as a child x 1. She is not sure if she was in ICU. No ED/PCP/urgent care/other specialist visits for asthma flare for the last year. She stopped smoking cigarettes. Was smoking 1/2 ppd for 12 years. Currently, she is vaping.   She has never had CXR-confirmed pneumonia in her life.    Since childhood, she has been having perennial chronic nasal symptoms (itch, clear rhinorrhea, stuffiness, and sneezing) and ocular symptoms (itching and watering).  She is worse around dogs/cats.  She tried some nasal sprays (Flonase, Nasacort, Rhinocort, Nasonex, QVAR with nasal adapter), but she didn't like them, especially aqueous ones.    Claritin was partially helpful. Zyrtec and Benadryl are sedating for  her.  There is no history of PE tubes, sinus surgeries, or T/A.    She usually has 2 sinus infections per year, mainly in Winter. All symptoms require oral antibiotics.  2-6 ear infection per year. All of the require abx as well.   She was on allergen immunotherapy in the past with Dr. Donato Landa.  She stopped it because she moved to [place] and she used to develop LLRs. No history of anaphylaxis with IT.  Eggplant, Kiwi, green pine-apple causes anaphylaxis, manifested by throat closing sensation, generalized urticaria, tongue and lip swelling. She gets hives with latex exposure almost right away.  She has an epi, but she doesn't carry it with her.     With milk, she develops just diarrhea. Discussed lactose intolerance.    She has random urticaria without obvious triggers. Occur once a week. Self-resolve.    When she was 10 years old, she had urticaria and emesis after taking one of the meds from [facility]. She thinks it happened 1-2 hours after. She has been avoiding PCNs since then.    Patient Active Problem List   Diagnosis     Mild intermittent asthma     Dysfunction of eustachian tube     Attention deficit disorder     Bipolar affective disorder (H)     Adverse reaction to viral vaccines     CARDIOVASCULAR SCREENING; LDL GOAL LESS THAN 130     Migraine headache     Prenatal care, first pregnancy     SGA (small for gestational age)     Supervision of normal first pregnancy     IUGR (intrauterine growth restriction) affecting care of mother, third trimester, fetus 1     Vaginal delivery     Chronic seasonal allergic rhinitis due to pollen     Chronic allergic rhinitis due to animal hair and dander     Allergic rhinitis due to dust mite     Allergic conjunctivitis, bilateral       Past Medical History:   Diagnosis Date     Chickenpox      Chlamydia     age 18      Problem (# of Occurrences) Relation (Name,Age of Onset)    Alcohol/Drug (1) Father (Ludwin)    Allergies (2) Father (Ludwin), Sister (Paula)    Breast  Cancer (2) Maternal Grandmother (Nae), Paternal Grandmother (Olivia)    C.A.D. (1) Maternal Grandfather: MI    Depression (2) Maternal Grandmother (Nae), Mother (Nettie)    HEART DISEASE (1) Maternal Grandfather    Neurologic Disorder (1) Sister (Paula): Narcolepsy    Other - See Comments (1) Other (mat Grt Aunt): Heart Attack    Substance Abuse (3) Father (Ludwin): recovered alcohol, Mother (Nettie): recovered alcohol, Sister: recovered drugs        Past Surgical History:   Procedure Laterality Date     D & C  2006    TAB     MOUTH SURGERY  2008    wisdom teeth     Social History     Social History     Marital status:      Spouse name: N/A     Number of children: 0     Years of education: 12.5+     Occupational History      [employer]     Social History Main Topics     Smoking status: Former Smoker     Packs/day: 0.25     Types: Cigarettes     Quit date: 10/2017     Smokeless tobacco: Never Used      Comment: Needle using ecgis since october 2018     Alcohol use 0.0 oz/week     0 Standard drinks or equivalent per week      Comment: 1 glass wine every other day- told on 1/14/16 to stop alcohol use     Drug use: No     Sexual activity: Yes     Partners: Male     Other Topics Concern     Parent/Sibling W/ Cabg, Mi Or Angioplasty Before 65f 55m? No     Social History Narrative    April 16, 2018    ENVIRONMENTAL HISTORY: [place] The home is heated with a wood stove and radiant heat. They do not have central air conditioning. The patient's bedroom is furnished with hard sánchez in bedroom, allergen mattress cover, allergen pillowcase cover, fabric window coverings and 2 rugs.  Pets inside the house include 1 cat. There is  history of cockroach or mice infestation. There is 1 smoker in the house.  The house does not have a damp basement.               Review of Systems   Constitutional: Positive for chills and fever.  Negative for activity change.   HENT: Positive for congestion, dental problem, ear pain, facial swelling (eyelid), postnasal drip, rhinorrhea, sinus pressure, sneezing and tinnitus. Negative for nosebleeds.    Eyes: Positive for discharge, redness and itching.   Respiratory: Positive for cough, shortness of breath and wheezing. Negative for chest tightness.    Cardiovascular: Negative for chest pain.   Gastrointestinal: Positive for nausea. Negative for diarrhea and vomiting.   Musculoskeletal: Positive for joint swelling and myalgias. Negative for arthralgias.   Skin: Negative for rash.   Allergic/Immunologic: Positive for food allergies.   Neurological: Positive for headaches.   Hematological: Negative for adenopathy.   Psychiatric/Behavioral: Negative for behavioral problems and self-injury.           Current Outpatient Prescriptions:      doxycycline monohydrate 100 MG capsule, Take 1 capsule (100 mg) by mouth 2 times daily for 7 days, Disp: 14 capsule, Rfl: 0     paragard intrauterine copper, 1 each by Intrauterine route once for 1 dose, Disp: 1 each, Rfl:      albuterol (PROAIR HFA/PROVENTIL HFA/VENTOLIN HFA) 108 (90 Base) MCG/ACT Inhaler, Inhale 2 puffs into the lungs every 4 hours as needed for shortness of breath / dyspnea or wheezing, Disp: 1 Inhaler, Rfl: 0     fluticasone furoate (ARNUITY ELLIPTA) 100 MCG/ACT AEPB inhalation powder, Inhale 1 puff into the lungs daily, Disp: 1 each, Rfl: 3     azelastine (OPTIVAR) 0.05 % SOLN ophthalmic solution, Apply 1 drop to eye 2 times daily as needed, Disp: 1 Bottle, Rfl: 1     Beclomethasone Dipropionate (QNASL) 80 MCG/ACT AERS Nasal Spray, Spray 2 sprays into both nostrils daily, Disp: 8.7 g, Rfl: 1     ofloxacin (FLOXIN) 0.3 % otic solution, Place 10 drops into the right ear daily for 7 days, Disp: 4 mL, Rfl: 0     EPINEPHrine (AUVI-Q) 0.3 MG/0.3ML injection 2-pack, Inject 0.3 mLs (0.3 mg) into the muscle as needed for anaphylaxis, Disp: 0.6 mL, Rfl: 1      "loratadine (CLARITIN) 10 MG tablet, Take 1 tablet (10 mg) by mouth daily as needed for allergies, Disp: 30 tablet, Rfl: 3     albuterol (ACCUNEB) 1.25 MG/3ML nebulizer solution, Take 3 mLs by nebulization every 4 hours as needed for shortness of breath / dyspnea. (Patient not taking: Reported on 4/16/2018), Disp: 1 Box, Rfl: 3     Albuterol Sulfate (PROAIR HFA) 108 (90 BASE) MCG/ACT AERS, Inhale 2 puffs into the lungs 4 times daily as needed. (Patient not taking: Reported on 4/16/2018), Disp: 3 Inhaler, Rfl: 3  Immunization History   Administered Date(s) Administered     DTAP (<7y) (Quadracel) 01/03/1991, 04/18/1991, 07/01/1991, 09/15/1992, 05/06/1996     HEPA 03/30/2007, 02/12/2008     HPV 03/30/2007, 02/12/2008     HepB 07/01/2003, 09/25/2003, 09/03/2004     Influenza (IIV3) PF 02/12/2008     MMR 09/15/1992, 07/01/2003     OPV, trivalent, live 01/03/1991, 04/18/1991, 09/15/1992, 05/06/1996     TD (ADULT, 7+) 07/01/2003     TDAP Vaccine (Adacel) 05/31/2011, 06/15/2016     Varicella Pt Report Hx of Varicella/Chicken Pox 04/01/1998     Allergies   Allergen Reactions     Kiwi Anaphylaxis     Throat swelling, difficulty breathing     Lanolin Hives     Latex Hives     Penicillins Rash     OBJECTIVE:                                                                 /70 (BP Location: Left arm, Patient Position: Sitting, Cuff Size: Adult Regular)  Pulse 86  Temp 98  F (36.7  C) (Oral)  Resp 16  Ht 1.69 m (5' 6.53\")  Wt 75.4 kg (166 lb 3.6 oz)  SpO2 100%  BMI 26.4 kg/m2        Physical Exam   Constitutional: She is oriented to person, place, and time. No distress.   HENT:   Head: Normocephalic and atraumatic.   Right Ear: External ear normal.   Left Ear: External ear and ear canal normal.   Nose: Mucosal edema present. No rhinorrhea. Right sinus exhibits no maxillary sinus tenderness and no frontal sinus tenderness. Left sinus exhibits maxillary sinus tenderness. Left sinus exhibits no frontal sinus tenderness. "   Mouth/Throat: Oropharynx is clear and moist and mucous membranes are normal.   Erythematous and edematous right external ear canal with yellow debris.  Right tympanic membrane with mild yellowish effusion.  Left tympanic membrane with clear effusion.    enlarged inferior nasal turbinates and bluish discoloration of nasal mucosa   Eyes: Conjunctivae are normal. Right eye exhibits no discharge. Left eye exhibits no discharge.   Neck: Normal range of motion.   Cardiovascular: Normal rate, regular rhythm and normal heart sounds.    No murmur heard.  Pulmonary/Chest: Effort normal and breath sounds normal. No respiratory distress. She has no wheezes. She has no rales.   Musculoskeletal: Normal range of motion.   Lymphadenopathy:     She has no cervical adenopathy.   Neurological: She is alert and oriented to person, place, and time.   Skin: Skin is warm. No rash noted. She is not diaphoretic.   Psychiatric: Mood, memory and affect normal.   Nursing note and vitals reviewed.      WORKUP:   SPIROMETRY       FVC 4.76L (114% of predicted).     FEV1 3.93L (111% of predicted).     FEV1/FVC 83%     FEF 25%-75%  4.16L/s (106% of predicted)    The office spirometry performed today doesn't suggest an obstruction.      Asthma Control Test (ACT) total score: 20     Percutaneous skin puncture testing for aeroallergens performed today (April 16, 2018) showed sensitivity to cat, dust mite (Dermatophagoides Farinae), tree pollen (Maple/Box Elder and oak), weed pollen (cocklebur, ragweed mix) and molds (Penicillium, Curvularia, Epicoccum, Aspergillus, Alternaria and Phoma).   The rest was negative with appropriate responses to positive and negative controls.      ASSESSMENT/PLAN:    Problem List Items Addressed This Visit        Respiratory    1. Mild intermittent asthma - Primary  Despite her ACT score of 20 and benign office spirometry, the patient states that she has symptoms almost daily.  -Start Arnuity Ellipta 100 mcg 1 puff once  daily.  --Start using albuterol inhaler 2-4 puffs every 4 hours as needed for persistent cough/chest tightness/wheezing/shortness of breath. Use it with chamber device.    Relevant Medications    albuterol (PROAIR HFA/PROVENTIL HFA/VENTOLIN HFA) 108 (90 Base) MCG/ACT Inhaler    fluticasone furoate (ARNUITY ELLIPTA) 100 MCG/ACT AEPB inhalation powder                Other Relevant Orders    Spirometry, Breathing Capacity (Completed)    ALLERGY SKIN TESTS,ALLERGENS (Completed)    OPTICHAMBER (Completed)    2. Chronic seasonal allergic rhinitis due to pollen  Avoidance measures discussed and information provided.  -Start Qnasl 80 mcg 2 puffs in each nostril once daily.  -She may take loratadine 10 mg by mouth daily as needed.  Taking cetirizine and diphenhydramine makes he sedated.  The patient states that she is almost sure that she is allergic to [place].  Skin test was negative.  Ordered serum IgE.  If symptoms persist despite medications and allergen avoidance, or if medications are not tolerated, allergen immunotherapy is recommended. Discussed briefly about allergen immunotherapy today.  The patient is interested.  She wants to call her insurance company to find out about coverage.    Relevant Medications            Beclomethasone Dipropionate (QNASL) 80 MCG/ACT AERS Nasal Spray    EPINEPHrine (AUVI-Q) 0.3 MG/0.3ML injection 2-pack    loratadine (CLARITIN) 10 MG tablet    Other Relevant Orders    Allergen white pine IgE (Completed)    3. Chronic allergic rhinitis due to animal hair and dander   The patient is sure that she is allergic to dogs and horses as well.  Skin test was negative.  -Ordered serum IgE.    Relevant Medications            Beclomethasone Dipropionate (QNASL) 80 MCG/ACT AERS Nasal Spray    EPINEPHrine (AUVI-Q) 0.3 MG/0.3ML injection 2-pack    loratadine (CLARITIN) 10 MG tablet    Other Relevant Orders    Allergen horse dander IgE    Allergen dog epithelium IgE    4. Allergic rhinitis due to  dust mite    Relevant Medications            Beclomethasone Dipropionate (QNASL) 80 MCG/ACT AERS Nasal Spray    loratadine (CLARITIN) 10 MG tablet       Infectious/Inflammatory    5. Allergic conjunctivitis, bilateral  -Use Optivar 1 drop in each eye twice daily as needed.      Relevant Medications    azelastine (OPTIVAR) 0.05 % SOLN ophthalmic solution      Other Visit Diagnoses     6. Urticaria      -Ordered latex IgE.  Strict avoidance suggested.  Occurs without any obvious triggers, and then self resolves.  It happens approximately once a week.  -She may take loratadine on as needed basis if symptoms are more persistent.      Relevant Medications                        loratadine (CLARITIN) 10 MG tablet    7. Latex sensitivity      Latex IgE was ordered.  Strict avoidance of latex is suggested.  -Suggest to carry injectable epinephrine with her all the time.      Relevant Medications                EPINEPHrine (AUVI-Q) 0.3 MG/0.3ML injection 2-pack    loratadine (CLARITIN) 10 MG tablet    Other Relevant Orders    Allergen latex IgE    8. Reaction to food, initial encounter     Sensitivity to kiwi seems to be secondary to cross-reactivity with latex.  Not sure about pineapple and eggplant.  -Ordered serum IgE for pineapple, eggplant and kiwi.  -Continue strict avoidance.  Anaphylaxis action plan was reviewed and provided.    Relevant Medications                EPINEPHrine (AUVI-Q) 0.3 MG/0.3ML injection 2-pack    loratadine (CLARITIN) 10 MG tablet    Other Relevant Orders    Allergen Food Pineapple IgE    Allergen Eggplant IgE    Allergen Food Kiwi IgE    9. Infective otitis externa, right      Contact dermatitis versus otitis externa.  She does have some yellow effusion behind the TM.  -Start Floxin.  The patient has  an evaluation with ENT in April.    Relevant Medications        ofloxacin (FLOXIN) 0.3 % otic solution        10. History of penicillin allergy     The time elapsed since the last reaction is  important because penicillin-specific IgE antibodies decrease over time, and therefore patients with recent reactions are more likely to be allergic than patients with distant reactions. Approximately 80 percent of patients with IgE-mediated penicillin allergy loose the sensitivity after 10 years.  -Recommend penicillin testing with subsequent amoxicillin oral challenge in office settings if the test is negative. She will think about it.      11. Acute recurrent maxillary sinusitis     She will start doxycycline as suggested by referring physician.  -Depending on symptom control, consider sinus CT.    12. Tobacco abuse    I believe that radha use of vaporizers to her nasal/ chest symptoms.  -Cessation was strongly encouraged.     I spent 80 minutes for this visit, with at least 45 minutes face-to-face counseling about diagnosis and treatment of asthma, allergic rhinoconjunctivitis, food allergies, sinusitis management, effect of passive smoking on children (her), and natural history of penicillin allergy.    Follow up in 6 weeks or sooner if needed.    Thank you for allowing us to participate in the care of this patient. Please feel free to contact us if there are any questions or concerns about the patient.    Disclaimer: This note consists of symbols derived from keyboarding, dictation and/or voice recognition software. As a result, there may be errors in the script that have gone undetected. Please consider this when interpreting information found in this chart.    Nj Rosenberg MD, [facility]  Allergy, Asthma and Immunology  [place], [place] and Sumit Brandt      Again, thank you for allowing me to participate in the care of your patient.        Sincerely,        Nj Rosenberg MD

## 2018-04-16 NOTE — PATIENT INSTRUCTIONS
ENT and asthma/allergy referrals made    Doxycycline sent to the pharmacy for symptoms    Diflucan as needed for yeast infection associated with antibiotic use    Follow up if symptoms do not improve or worsen.

## 2018-04-16 NOTE — PATIENT INSTRUCTIONS
Get the blood work done.  -Start Arnuity 100 mcg 1 puff once daily.  -Start using albuterol inhaler 2-4 puffs every 4 hours as needed for persistent cough/chest tightness/wheezing/shortness of breath. Use it with chamber device.  -Start Qnasl 80 mcg 2 puffs in each nostril once daily.  -Start Optivar 1 drop in each eye twice daily as needed.  -Start Floxin eardrops 10 drops in the right ear once daily for 7 days.  -Start antibiotics as prescribed.  -if you have hives that are not well controlled, you may take Claritin 10 mg by mouth daily as needed.    The time elapsed since the last reaction is important because penicillin-specific IgE antibodies decrease over time, and therefore patients with recent reactions are more likely to be allergic than patients with distant reactions. Approximately 80 percent of patients with IgE-mediated penicillin allergy loose the sensitivity after 10 years.  -Recommend penicillin testing with subsequent amoxicillin oral challenge in office settings if the test is negative.    AEROALLERGEN AVOIDANCE INSTRUCTIONS  MOLD  Indoors, mold season is year round. Outdoors, most mold prefer seasons with high humidity. Mold prefers damp, dark, warm places. Here are some tips on how to avoid mold exposure.  1.  Keep humidity inside between 35-50% with air conditioning or dehumidifier. The humidity level can be checked with a meter from a hardware store.   2.  Clean surfaces where mold grows and dry wet areas.  3.  Avoid steam cleaning carpets and discard moldy belongings.  4.  Wear a mask when doing yard work and refrain from walking through uncut fields or playing in leaves.  5.  Minimize use of potted plants and do not keep them indoors.  6.  Consider an allergy cover for the pillow and mattress.  POLLEN  Pollens are the tiny airborne particles given off by trees, weeds, and grasses. They can be the cause of seasonal allergic rhinitis or hay fever symptoms, which include stuffy, itchy, runny  nose, redness, swelling and itching of the eyes, and itching of the ears and throat. Here are some tips on how to avoid pollen exposure.  1. .Keep windows closed and use the air conditioner when possible.  2.  Avoid outside exposure in the early morning as pollen counts are highest at that time.  3.  Take a shower and wash hair each night.  4.  Consider wearing a mask when working in the yard and/or garden.  5.  Clean furnace filter monthly with HEPA filters. Consider a HEPA filter vacuum  which will prevent pollen from being reintroduced into the air.   DUST MITES  Dust mites can never be entirely eliminated in the house no matter how clean your house is. Dust mites are attracted to warm, moist areas and feed on dead skin flakes. Here are tips to minimize dust mites in your home.  1.  Encase pillows and mattress/box springs in zippered allergy covers.  2.  Wash bedding in hot water (at least 130 F) every 7-14 days.  3.  Avoid curtains, carpet, and upholstered furniture if possible.  4.  Use HEPA air filters and a HEPA filter vacuum . Change filters monthly. Vacuum weekly.  5.  Keep bedroom simple, avoiding clutter, so it can quickly be dusted.  6.  Cover heating vents with vent filters.  7.  Keep stuffed toys in a closed container and wash or freeze regularly.  8.  Keep clothing in the closet with the door closed.  PETS  Pets present many problems for people with allergies. Dander from pets is very difficult to remove and also is a food source for dust mites.  1.  If possible, find the pet a new home.  2.  If not possible, keep the pet outdoors. Never allow the pet into the bedroom.  3.  Wash pet weekly in warm water.  4.  Encase mattresses, pillows, and box springs in allergen-proof covers.  5.  Use HEPA air filters and a HEPA filter vacuum . Change filters monthly.        Using an Inhaler with a Spacer  To control asthma, you need to use your medicines the right way. Some medicines are  inhaled using a device called a metered-dose inhaler (MDI). Metered-dose inhalers deliver medicine with a fine spray. You may be asked to use a spacer (holding tube) with your inhaler. The spacer helps make sure all the medicine you need goes into your lungs.   Steps for using an inhaler with a spacer  Step 1:    Remove the caps from the inhaler and spacer.    Shake the inhaler well and attach the spacer. If the inhaler is being used for the first time or has not been used for a while, prime it as directed by the product maker.  Step 2:    Breathe out normally.    Put the spacer between your teeth. Close your lips tightly around it.    Keep your chin up.  Step 3:    Spray 1 puff into the spacer by pressing down on the inhaler.    Then breathe in through your mouth as slowly and deeply as you can. This should take about 5-10 seconds. If you breathe too quickly, you may hear a whistling sound in certain spacers.  Step 4:    Take the spacer out of your mouth.    Hold your breath for a count of 10.    Then hold your lips together and slowly breathe out through your mouth.          If you re prescribed more than 1 puff of medicine at a time, wait at least 30 seconds between puffs. This number may be different for different medicines. Shake the inhaler again. Then repeat steps 2 to 4.   Date Last Reviewed: 10/1/2016    2340-1270 [employer]. [address] [place], [place] [address]. All rights reserved. This information is not intended as a substitute for professional medical care. Always follow your healthcare professional's instructions.      Anaphylaxis Action Plan for Immunotherapy Patients    There is risk of systemic reactions when receiving immunotherapy injections. Local reactions such as a wheal (hive) smaller than a quarter, redness, swelling, and soreness are common. If the wheal is greater than the size of a half dollar (3.4 cm) please contact our clinic (numbers below), as we will need to  adjust the dose that you receive at your next injection. Waiting until the next appointment to inform us of the reaction could increase the wait time that you experience, because your allergist will need to be contacted for new orders prior to giving the injection.  If you have symptoms not localized to the injection site, please follow the anaphylaxis plan, and contact our office to update after you have received emergency medical treatment. Please ask to speak to an Allergy RN with any questions or updates.     [facility]: [phone]  [facility]: [phone]  [facility]: [phone]  [place]: [phone]  [phone]

## 2018-04-16 NOTE — NURSING NOTE
Writer demonstrated how to use an Auvi-Q Epinephrine auto-injector.  Patient instructed to remove cap from device and follow the instructions given by the recorded audio. This includes removing the red safety release by pulling straight out, then firmly pushing the black tip against outer thigh until it clicks, hold for 5 seconds.  Patient advised that once used, needle will not be exposed, as it retracts back into the device. Patient was able to practice with the training device and demonstrated correct technique. Patient advised to call 911 or go to emergency department after Auvi-Q use for further monitoring.       Write demonstrated epi pen technique.  Informed that she must pull blue end off of pen before use.  Hold firmly in one hand and press down into the thigh. The injection should go in the middle, outer thigh and hold for 10 seconds to ensure the delivery of all medication.  Informed that the needle can go through clothing but that any seams should be avoided.  Instructed to keep both pens together in case a second dose is needed.  Instructed that if epi is used, he should still go to the ED.  Instructed to keep epi-pen out of extreme temperatures.  Check expiration date.  Do not use  Epi.  Patient verbalized understanding.    Reviewed immunotherapy packet with patient. Patient states understanding. Has no further questions. Patient signed the consent form for immunotherapy and was told that the Allergy Lab would contact patient once the serums arrive.    Writer demonstrated how to use an MDI inhaler with a spacer for patient.  Patient instructed to shake the inhaler for 5 seconds, empty the lungs by exhaling away from inhaler, put the inhaler + spacer in her mouth, push down on the inhaler and breathe in at the same time and then hold breath for 10 seconds.  RN informed patient that if an audible whistle is heard from spacer, she needs to inhale more slowly.  Patient advised to wait 1-2 minutes  between puffs.  Patient instructed on how to clean the MDI inhaler, take the medication canister out, wash it in warm soapy water, rinse it and then let it dry over night.  RN advised patient to refer to handout with spacer for cleaning instructions.  Patient informed the inhaler needs to be washed once a week or when he notices a powder buildup.  Patient verbalized understanding.     LATESHA Baker

## 2018-04-16 NOTE — PROGRESS NOTES
SUBJECTIVE:   Sherley Quijano is a 27 year old female who presents to clinic today for the following health issues:    ENT Symptoms             Symptoms: cc Present Absent Comment   Fever/Chills  x     Fatigue  x     Muscle Aches  x     Eye Irritation   x    Sneezing  x     Nasal [place]/Drg  x     Sinus Pressure/Pain  x     Loss of smell   x    Dental pain  x     Sore Throat  x     Swollen Glands   x    Ear Pain/Fullness x   Right ear    Cough  x     Wheeze  x     Chest Pain   x    Shortness of breath  x  Some   Rash       Other         Symptom duration:  For the last year    Symptom severity:     Treatments tried:     Contacts:  Family      Long history of ear problems-needs antibiotics 2-6 times per year.   Did get allergy injections in the past-did help with sinus symptoms but not ears-had done in [place] stopped with move to [place]  Has used nasal spray and allergy medication but neither helped    Problem list and histories reviewed & adjusted, as indicated.  Additional history: as documented    Patient Active Problem List   Diagnosis     Mild intermittent asthma     Dysfunction of eustachian tube     Attention deficit disorder     Bipolar affective disorder (H)     Adverse reaction to viral vaccines     CARDIOVASCULAR SCREENING; LDL GOAL LESS THAN 130     Migraine headache     Prenatal care, first pregnancy     SGA (small for gestational age)     Supervision of normal first pregnancy     IUGR (intrauterine growth restriction) affecting care of mother, third trimester, fetus 1     Vaginal delivery     Past Surgical History:   Procedure Laterality Date     D & C  2006    TAB     MOUTH SURGERY  2008    wisdom teeth       Social History   Substance Use Topics     Smoking status: Current Every Day Smoker     Packs/day: 0.25     Types: Cigarettes     Smokeless tobacco: Never Used      Comment: smoking 15 cig/day- down to 2 cig/day with pregnancy     Alcohol use 0.0 oz/week     0 Standard drinks or equivalent per  week      Comment: 1 glass wine every other day- told on 1/14/16 to stop alcohol use     Family History   Problem Relation Age of Onset     C.A.D. Maternal Grandfather      MI     HEART DISEASE Maternal Grandfather      Breast Cancer Maternal Grandmother      Depression Maternal Grandmother      Breast Cancer Paternal Grandmother      Alcohol/Drug Father      Allergies Father      Substance Abuse Father      recovered alcohol     Allergies Sister      Neurologic Disorder Sister      Narcolepsy     Substance Abuse Mother      recovered alcohol     Depression Mother      Substance Abuse Sister      recovered drugs     Other - See Comments Other      Heart Attack         Current Outpatient Prescriptions   Medication Sig Dispense Refill     doxycycline monohydrate 100 MG capsule Take 1 capsule (100 mg) by mouth 2 times daily for 7 days 14 capsule 0     fluconazole (DIFLUCAN) 150 MG tablet Take 1 tablet (150 mg) by mouth once for 1 dose 1 tablet 0     paragard intrauterine copper 1 each by Intrauterine route once for 1 dose 1 each      albuterol (ACCUNEB) 1.25 MG/3ML nebulizer solution Take 3 mLs by nebulization every 4 hours as needed for shortness of breath / dyspnea. 1 Box 3     Albuterol Sulfate (PROAIR HFA) 108 (90 BASE) MCG/ACT AERS Inhale 2 puffs into the lungs 4 times daily as needed. 3 Inhaler 3     Fenugreek 610 MG CAPS Take 3,660 mg by mouth 3 times daily       Allergies   Allergen Reactions     Kiwi Anaphylaxis     Throat swelling, difficulty breathing     Lanolin Hives     Latex Hives     Penicillins Rash     Labs reviewed in EPIC    Reviewed and updated as needed this visit by clinical staff  Tobacco  Allergies  Meds  Med Hx  Surg Hx  Fam Hx  Soc Hx      Reviewed and updated as needed this visit by Provider         ROS:  Constitutional, HEENT, cardiovascular, pulmonary, gi and gu systems are negative, except as otherwise noted.    OBJECTIVE:     /74 (BP Location: Right arm, Patient Position:  Sitting, Cuff Size: Adult Regular)  Pulse 80  Temp 97.2  F (36.2  C) (Tympanic)  Wt 171 lb 9.6 oz (77.8 kg)  BMI 27.7 kg/m2  Body mass index is 27.7 kg/(m^2).  GENERAL: healthy, alert and no distress  HENT: normal cephalic/atraumatic, both ears: clear effusion, nose and mouth without ulcers or lesions, nasal mucosa edematous , oropharynx clear, oral mucous membranes moist and sinuses: frontal, ethmoid tenderness on bilateral  NECK: no adenopathy  RESP: lungs clear to auscultation - no rales, rhonchi or wheezes  CV: regular rate and rhythm, normal S1 S2, no S3 or S4, no murmur, click or rub  ABDOMEN: soft, nontender, and bowel sounds normal  PSYCH: mentation appears normal, affect normal/bright    Diagnostic Test Results:  none     ASSESSMENT/PLAN:     1. Dysfunction of both eustachian tubes  ENT referral made for ongoing symptoms.    - OTOLARYNGOLOGY REFERRAL    2. Mild intermittent asthma without complication  Stable.  Asthma/allergy referral made-would like to consider allergy injections again.  - ALLERGY/ASTHMA ADULT REFERRAL    3. Seasonal allergic rhinitis, unspecified chronicity, unspecified trigger  Per above  - ALLERGY/ASTHMA ADULT REFERRAL    4. Acute sinusitis with symptoms > 10 days  Doxycycline sent to the pharmacy for ongoing symptoms.  Symptomatic care and follow up discussed.  - doxycycline monohydrate 100 MG capsule; Take 1 capsule (100 mg) by mouth 2 times daily for 7 days  Dispense: 14 capsule; Refill: 0    5. Candidiasis of vagina  Gets yeast infections with antibiotic use.  Diflucan sent to the pharmacy.  - fluconazole (DIFLUCAN) 150 MG tablet; Take 1 tablet (150 mg) by mouth once for 1 dose  Dispense: 1 tablet; Refill: 0    Home care instructions were reviewed with the patient. The risks, benefits and treatment options of prescribed medications or other treatments have been discussed with the patient. The patient verbalized their understanding and should call or follow up if no improvement  or if they develop further problems.    Patient Instructions   ENT and asthma/allergy referrals made    Doxycycline sent to the pharmacy for symptoms    Diflucan as needed for yeast infection associated with antibiotic use    Follow up if symptoms do not improve or worsen.        MASHA Orellana [facility]

## 2018-04-17 ASSESSMENT — ASTHMA QUESTIONNAIRES
ACT_TOTALSCORE: 20
ACT_TOTALSCORE: 20

## 2018-04-25 ENCOUNTER — OFFICE VISIT (OUTPATIENT)
Dept: OTOLARYNGOLOGY | Facility: CLINIC | Age: 28
End: 2018-04-25
Payer: COMMERCIAL

## 2018-04-25 VITALS
HEART RATE: 64 BPM | BODY MASS INDEX: 26.84 KG/M2 | TEMPERATURE: 96.9 F | HEIGHT: 67 IN | DIASTOLIC BLOOD PRESSURE: 69 MMHG | WEIGHT: 171 LBS | SYSTOLIC BLOOD PRESSURE: 125 MMHG

## 2018-04-25 DIAGNOSIS — H69.93 DYSFUNCTION OF BOTH EUSTACHIAN TUBES: Primary | ICD-10-CM

## 2018-04-25 DIAGNOSIS — H61.21 IMPACTED CERUMEN OF RIGHT EAR: ICD-10-CM

## 2018-04-25 PROCEDURE — 99203 OFFICE O/P NEW LOW 30 MIN: CPT | Mod: 25 | Performed by: OTOLARYNGOLOGY

## 2018-04-25 PROCEDURE — 69210 REMOVE IMPACTED EAR WAX UNI: CPT | Performed by: OTOLARYNGOLOGY

## 2018-04-25 NOTE — LETTER
4/25/2018         RE: Sherley Quijano  [address] [facility] [address]        Dear Colleague,    Thank you for referring your patient, Sherley Quijano, to the [facility]. Please see a copy of my visit note below.        History of Present Illness - Sherley Quijano is a 27 year old female who presents with a sensation of fluid in both ears intermittently. She has a history of hearing trouble since around age 2. She has     Past Medical History -   Patient Active Problem List   Diagnosis     Mild intermittent asthma     Dysfunction of eustachian tube     Attention deficit disorder     Bipolar affective disorder (H)     Adverse reaction to viral vaccines     CARDIOVASCULAR SCREENING; LDL GOAL LESS THAN 130     Migraine headache     Prenatal care, first pregnancy     SGA (small for gestational age)     Supervision of normal first pregnancy     IUGR (intrauterine growth restriction) affecting care of mother, third trimester, fetus 1     Vaginal delivery     Chronic seasonal allergic rhinitis due to pollen     Chronic allergic rhinitis due to animal hair and dander     Allergic rhinitis due to dust mite     Allergic conjunctivitis, bilateral       Current Medications -   Current Outpatient Prescriptions:      albuterol (ACCUNEB) 1.25 MG/3ML nebulizer solution, Take 3 mLs by nebulization every 4 hours as needed for shortness of breath / dyspnea., Disp: 1 Box, Rfl: 3     albuterol (PROAIR HFA/PROVENTIL HFA/VENTOLIN HFA) 108 (90 Base) MCG/ACT Inhaler, Inhale 2 puffs into the lungs every 4 hours as needed for shortness of breath / dyspnea or wheezing, Disp: 1 Inhaler, Rfl: 0     Albuterol Sulfate (PROAIR HFA) 108 (90 BASE) MCG/ACT AERS, Inhale 2 puffs into the lungs 4 times daily as needed., Disp: 3 Inhaler, Rfl: 3     azelastine (OPTIVAR) 0.05 % SOLN ophthalmic solution, Apply 1 drop to eye 2 times daily as needed, Disp: 1 Bottle, Rfl: 1     Beclomethasone Dipropionate (QNASL) 80 MCG/ACT AERS Nasal Spray, Spray  2 sprays into both nostrils daily, Disp: 8.7 g, Rfl: 1     EPINEPHrine (AUVI-Q) 0.3 MG/0.3ML injection 2-pack, Inject 0.3 mLs (0.3 mg) into the muscle as needed for anaphylaxis, Disp: 0.6 mL, Rfl: 1     fluticasone furoate (ARNUITY ELLIPTA) 100 MCG/ACT AEPB inhalation powder, Inhale 1 puff into the lungs daily, Disp: 1 each, Rfl: 3     loratadine (CLARITIN) 10 MG tablet, Take 1 tablet (10 mg) by mouth daily as needed for allergies, Disp: 30 tablet, Rfl: 3     paragard intrauterine copper, 1 each by Intrauterine route once for 1 dose, Disp: 1 each, Rfl:     Allergies -   Allergies   Allergen Reactions     Kiwi Anaphylaxis     Throat swelling, difficulty breathing     Lanolin Hives     Latex Hives     Penicillins Rash       Social History -   Social History     Social History     Marital status:      Spouse name: N/A     Number of children: 0     Years of education: 12.5+     Occupational History      [employer]     Social History Main Topics     Smoking status: Former Smoker     Packs/day: 0.25     Types: Cigarettes     Quit date: 10/2017     Smokeless tobacco: Never Used      Comment: Needle using ecgis since october 2018     Alcohol use 0.0 oz/week     0 Standard drinks or equivalent per week      Comment: 1 glass wine every other day- told on 1/14/16 to stop alcohol use     Drug use: No     Sexual activity: Yes     Partners: Male     Other Topics Concern     Parent/Sibling W/ Cabg, Mi Or Angioplasty Before 65f 55m? No     Social History Narrative    April 16, 2018    ENVIRONMENTAL HISTORY: [place] The home is heated with a wood stove and radiant heat. They do not have central air conditioning. The patient's bedroom is furnished with hard sánchez in bedroom, allergen mattress cover, allergen pillowcase cover, fabric window coverings and 2 rugs.  Pets inside the house include 1 cat. There is  history of cockroach  "or mice infestation. There is 1 smoker in the house.  The house does not have a damp basement.           Family History -   Family History   Problem Relation Age of Onset     C.A.D. Maternal Grandfather      MI     HEART DISEASE Maternal Grandfather      Breast Cancer Maternal Grandmother      Depression Maternal Grandmother      Breast Cancer Paternal Grandmother      Alcohol/Drug Father      Allergies Father      Substance Abuse Father      recovered alcohol     Allergies Sister      Neurologic Disorder Sister      Narcolepsy     Substance Abuse Mother      recovered alcohol     Depression Mother      Substance Abuse Sister      recovered drugs     Other - See Comments Other      Heart Attack       Review of Systems - As per HPI and PMHx, otherwise 7 system review of the head and neck negative. 10+ system review negative.    Physical Exam  /69 (BP Location: Right arm, Patient Position: Chair, Cuff Size: Adult Regular)  Pulse 64  Temp 96.9  F (36.1  C) (Tympanic)  Ht 1.689 m (5' 6.5\")  Wt 77.6 kg (171 lb)  BMI 27.19 kg/m2  General - The patient is well nourished and well developed, and appears to have good nutritional status.  Alert and oriented to person and place, answers questions and cooperates with examination appropriately.   Head and Face - Normocephalic and atraumatic, with no gross asymmetry noted of the contour of the facial features.  The facial nerve is intact, with strong symmetric movements.  Voice and Breathing - The patient was breathing comfortably without the use of accessory muscles. There was no wheezing, stridor, or stertor.  The patients voice was clear and strong, and had appropriate pitch and quality.  Ears - Bilateral pinna and EACs with normal appearing overlying skin. Left TM intact, no effusion. Right TM covered with a sheet of cerumen impacted on the surface. Once this was removed, there were scant air-fluid levels in the middle ear, and autoinsufflation was normal.  Eyes - " Extraocular movements intact.  Sclera were not icteric or injected, conjunctiva were pink and moist.  Mouth - Examination of the oral cavity showed pink, healthy oral mucosa. No lesions or ulcerations noted.  The tongue was mobile and midline, and the dentition were in good condition.    Throat - The walls of the oropharynx were smooth, pink, moist, symmetric, and had no lesions or ulcerations.  The tonsillar pillars and soft palate were symmetric.  The uvula was midline on elevation.  Neck - Normal midline excursion of the laryngotracheal complex during swallowing.  Full range of motion on passive movement.  Palpation of the occipital, submental, submandibular, internal jugular chain, and supraclavicular nodes did not demonstrate any abnormal lymph nodes or masses.  The carotid pulse was palpable bilaterally.  Palpation of the thyroid was soft and smooth, with no nodules or goiter appreciated.  The trachea was mobile and midline.  Nose - External contour is symmetric, no gross deflection or scars.  Nasal mucosa is pink and moist with no abnormal mucus.  The septum was midline and non-obstructive, turbinates of normal size and position.  No polyps, masses, or purulence noted on examination.    Procedure: Cerumen Disimpaction  Diagnosis: Cerumen Impaction    Procedure and Findings  Ears - On examination of the ears, I found that the  ears were impacted with dense cerumen obscuring visualization of the right TM.  Therefore, I positioned the patient and I used the binocular surgical microscope to assist in visualization of the affected ear(s).  I began by using a cerumen loop to gently lift the edges of the cerumen mass away from the walls of the external canal.  Once I did this, I was able to suction away fragments of wax and debris using suction.  Once the mass was loose enough, the entire plug was pulled from the canal with microforceps.  The tympanic membrane was intact without sign of perforation or middle ear  effusion and minimal ear canal trauma.               Assessment - Sherley Quijano is a 27 year old female with right impacted cerumen on the lateral surface of the TM. I think this was mimicking a purulent effusion, so I think she can stop antibiotics. She did seem to have some right partial serous effusion, but was able to autoinsufflate well. I advised her to return in 2 weeks with an audiogram. I reassured her that there is a good chance she will feel better with just the cleaning.        Dr. Rogers Nevarez MD  Otolaryngology  [facility]        Again, thank you for allowing me to participate in the care of your patient.        Sincerely,        Rogers Nevarez MD

## 2018-04-25 NOTE — MR AVS SNAPSHOT
After Visit Summary   4/25/2018    Sherley Quijano    MRN: [id number]           Patient Information     Date Of Birth          1990        Visit Information        Provider Department      4/25/2018 12:15 PM Rogers Nevarez MD [facility]        Today's Diagnoses     Dysfunction of both eustachian tubes    -  1    Impacted cerumen of right ear          Care Instructions    Per Physician's instructions            Follow-ups after your visit        Additional Services     AUDIOLOGY ADULT REFERRAL       Your provider has referred you to: [facility] [phone]   [email or web address]    Specialty Testing:  Audiogram w/Tymps and Reflexes (Comprehensive Audiology Evaluation)                  Your next 10 appointments already scheduled     May 09, 2018  2:30 PM CDT   Return Visit with Eloina Bustos   [facility]    [address] [place] [address] [phone]            May 09, 2018  3:00 PM CDT   Return Visit with Rogers Nevarez MD   [facility]    [address] [place] [address] [phone]            Jun 04, 2018 11:00 AM CDT   Return Visit with Nj Rosenberg MD   [facility]    [address] [place] [address] [phone]              Who to contact     If you have questions or need follow up information about today's clinic visit or your schedule please contact [facility] directly at [phone].  Normal or non-critical lab and imaging results will be communicated to you by MyChart, letter or phone within 4 business days after the clinic has received the results. If you do not hear from us within 7 days, please contact the clinic through MyChart or phone. If you have a critical or abnormal lab result, we will notify you by phone as soon as  "possible.  Submit refill requests through [employer] or call your pharmacy and they will forward the refill request to us. Please allow 3 business days for your refill to be completed.          Additional Information About Your Visit        [facility] Information     [employer] gives you secure access to your electronic health record. If you see a primary care provider, you can also send messages to your care team and make appointments. If you have questions, please call your primary care clinic.  If you do not have a primary care provider, please call [phone] and they will assist you.        Care EveryWhere ID     This is your Care EveryWhere ID. This could be used by other organizations to access your [place] medical records  [id number]        Your Vitals Were     Pulse Temperature Height BMI (Body Mass Index)          64 96.9  F (36.1  C) (Tympanic) 1.689 m (5' 6.5\") 27.19 kg/m2         Blood Pressure from Last 3 Encounters:   04/25/18 125/69   04/16/18 116/70   04/16/18 104/74    Weight from Last 3 Encounters:   04/25/18 77.6 kg (171 lb)   04/16/18 75.4 kg (166 lb 3.6 oz)   04/16/18 77.8 kg (171 lb 9.6 oz)              We Performed the Following     AUDIOLOGY ADULT REFERRAL     Remove Thad        Primary Care Provider Office Phone # Fax #    Mya Mcmahon [phone] [address] [facility] [address]        Equal Access to Services     [facility]: Hadii aad ku hadasho Soomaali, waaxda luqadaha, qaybta kaalmada adeegyada, kimi mendez. So [facility] [phone].    ATENCIÓN: Si habla español, tiene a roa disposición servicios gratuitos de asistencia lingüística. Britta al [phone].    We comply with applicable federal civil rights laws and [place] laws. We do not discriminate on the basis of race, color, national origin, age, disability, sex, sexual orientation, or gender identity.            Thank you!     Thank you for choosing [facility] " [place]  for your care. Our goal is always to provide you with excellent care. Hearing back from our patients is one way we can continue to improve our services. Please take a few minutes to complete the written survey that you may receive in the mail after your visit with us. Thank you!             Your Updated Medication List - Protect others around you: Learn how to safely use, store and throw away your medicines at www.disposemymeds.org.          This list is accurate as of 4/25/18 12:48 PM.  Always use your most recent med list.                   Brand Name Dispense Instructions for use Diagnosis    azelastine 0.05 % Soln ophthalmic solution    OPTIVAR    1 Bottle    Apply 1 drop to eye 2 times daily as needed    Allergic conjunctivitis, bilateral       Beclomethasone Dipropionate 80 MCG/ACT Aers Nasal Spray    QNASL    8.7 g    Spray 2 sprays into both nostrils daily    Chronic seasonal allergic rhinitis due to pollen, Chronic allergic rhinitis due to animal hair and dander, Allergic rhinitis due to dust mite       EPINEPHrine 0.3 MG/0.3ML injection 2-pack    AUVI-Q    0.6 mL    Inject 0.3 mLs (0.3 mg) into the muscle as needed for anaphylaxis    Chronic allergic rhinitis due to animal hair and dander, Chronic seasonal allergic rhinitis due to pollen, Mild intermittent asthma without complication, Latex sensitivity, Reaction to food, initial encounter       fluticasone furoate 100 MCG/ACT Aepb inhalation powder    ARNUITY ELLIPTA    1 each    Inhale 1 puff into the lungs daily    Mild intermittent asthma without complication       loratadine 10 MG tablet    CLARITIN    30 tablet    Take 1 tablet (10 mg) by mouth daily as needed for allergies    Urticaria       paragard intrauterine copper     1 each    1 each by Intrauterine route once for 1 dose        * PROAIR  (90 Base) MCG/ACT Inhaler   Generic drug:  albuterol     3 Inhaler    Inhale 2 puffs into the lungs 4 times daily as needed.    Mild  intermittent asthma       * albuterol 1.25 MG/3ML nebulizer solution    ACCUNEB    1 Box    Take 3 mLs by nebulization every 4 hours as needed for shortness of breath / dyspnea.    Asthma with acute exacerbation       * albuterol 108 (90 Base) MCG/ACT Inhaler    PROAIR HFA/PROVENTIL HFA/VENTOLIN HFA    1 Inhaler    Inhale 2 puffs into the lungs every 4 hours as needed for shortness of breath / dyspnea or wheezing    Mild intermittent asthma without complication       * Notice:  This list has 3 medication(s) that are the same as other medications prescribed for you. Read the directions carefully, and ask your doctor or other care provider to review them with you.

## 2018-04-25 NOTE — PROGRESS NOTES
History of Present Illness - Sherley Quijano is a 27 year old female who presents with a sensation of fluid in both ears intermittently. She has a history of hearing trouble since around age 2. She has     Past Medical History -   Patient Active Problem List   Diagnosis     Mild intermittent asthma     Dysfunction of eustachian tube     Attention deficit disorder     Bipolar affective disorder (H)     Adverse reaction to viral vaccines     CARDIOVASCULAR SCREENING; LDL GOAL LESS THAN 130     Migraine headache     Prenatal care, first pregnancy     SGA (small for gestational age)     Supervision of normal first pregnancy     IUGR (intrauterine growth restriction) affecting care of mother, third trimester, fetus 1     Vaginal delivery     Chronic seasonal allergic rhinitis due to pollen     Chronic allergic rhinitis due to animal hair and dander     Allergic rhinitis due to dust mite     Allergic conjunctivitis, bilateral       Current Medications -   Current Outpatient Prescriptions:      albuterol (ACCUNEB) 1.25 MG/3ML nebulizer solution, Take 3 mLs by nebulization every 4 hours as needed for shortness of breath / dyspnea., Disp: 1 Box, Rfl: 3     albuterol (PROAIR HFA/PROVENTIL HFA/VENTOLIN HFA) 108 (90 Base) MCG/ACT Inhaler, Inhale 2 puffs into the lungs every 4 hours as needed for shortness of breath / dyspnea or wheezing, Disp: 1 Inhaler, Rfl: 0     Albuterol Sulfate (PROAIR HFA) 108 (90 BASE) MCG/ACT AERS, Inhale 2 puffs into the lungs 4 times daily as needed., Disp: 3 Inhaler, Rfl: 3     azelastine (OPTIVAR) 0.05 % SOLN ophthalmic solution, Apply 1 drop to eye 2 times daily as needed, Disp: 1 Bottle, Rfl: 1     Beclomethasone Dipropionate (QNASL) 80 MCG/ACT AERS Nasal Spray, Spray 2 sprays into both nostrils daily, Disp: 8.7 g, Rfl: 1     EPINEPHrine (AUVI-Q) 0.3 MG/0.3ML injection 2-pack, Inject 0.3 mLs (0.3 mg) into the muscle as needed for anaphylaxis, Disp: 0.6 mL, Rfl: 1     fluticasone furoate (ARNUITY  ELLIPTA) 100 MCG/ACT AEPB inhalation powder, Inhale 1 puff into the lungs daily, Disp: 1 each, Rfl: 3     loratadine (CLARITIN) 10 MG tablet, Take 1 tablet (10 mg) by mouth daily as needed for allergies, Disp: 30 tablet, Rfl: 3     paragard intrauterine copper, 1 each by Intrauterine route once for 1 dose, Disp: 1 each, Rfl:     Allergies -   Allergies   Allergen Reactions     Kiwi Anaphylaxis     Throat swelling, difficulty breathing     Lanolin Hives     Latex Hives     Penicillins Rash       Social History -   Social History     Social History     Marital status:      Spouse name: N/A     Number of children: 0     Years of education: 12.5+     Occupational History      [employer]  [employer]     Social History Main Topics     Smoking status: Former Smoker     Packs/day: 0.25     Types: Cigarettes     Quit date: 10/2017     Smokeless tobacco: Never Used      Comment: Needle using ecgis since october 2018     Alcohol use 0.0 oz/week     0 Standard drinks or equivalent per week      Comment: 1 glass wine every other day- told on 1/14/16 to stop alcohol use     Drug use: No     Sexual activity: Yes     Partners: Male     Other Topics Concern     Parent/Sibling W/ Cabg, Mi Or Angioplasty Before 65f 55m? No     Social History Narrative    April 16, 2018    ENVIRONMENTAL HISTORY: [place] The home is heated with a wood stove and radiant heat. They do not have central air conditioning. The patient's bedroom is furnished with hard sánchez in bedroom, allergen mattress cover, allergen pillowcase cover, fabric window coverings and 2 rugs.  Pets inside the house include 1 cat. There is  history of cockroach or mice infestation. There is 1 smoker in the house.  The house does not have a damp basement.           Family History -   Family History   Problem Relation Age of Onset     C.A.D. Maternal Grandfather      MI     HEART DISEASE  "Maternal Grandfather      Breast Cancer Maternal Grandmother      Depression Maternal Grandmother      Breast Cancer Paternal Grandmother      Alcohol/Drug Father      Allergies Father      Substance Abuse Father      recovered alcohol     Allergies Sister      Neurologic Disorder Sister      Narcolepsy     Substance Abuse Mother      recovered alcohol     Depression Mother      Substance Abuse Sister      recovered drugs     Other - See Comments Other      Heart Attack       Review of Systems - As per HPI and PMHx, otherwise 7 system review of the head and neck negative. 10+ system review negative.    Physical Exam  /69 (BP Location: Right arm, Patient Position: Chair, Cuff Size: Adult Regular)  Pulse 64  Temp 96.9  F (36.1  C) (Tympanic)  Ht 1.689 m (5' 6.5\")  Wt 77.6 kg (171 lb)  BMI 27.19 kg/m2  General - The patient is well nourished and well developed, and appears to have good nutritional status.  Alert and oriented to person and place, answers questions and cooperates with examination appropriately.   Head and Face - Normocephalic and atraumatic, with no gross asymmetry noted of the contour of the facial features.  The facial nerve is intact, with strong symmetric movements.  Voice and Breathing - The patient was breathing comfortably without the use of accessory muscles. There was no wheezing, stridor, or stertor.  The patients voice was clear and strong, and had appropriate pitch and quality.  Ears - Bilateral pinna and EACs with normal appearing overlying skin. Left TM intact, no effusion. Right TM covered with a sheet of cerumen impacted on the surface. Once this was removed, there were scant air-fluid levels in the middle ear, and autoinsufflation was normal.  Eyes - Extraocular movements intact.  Sclera were not icteric or injected, conjunctiva were pink and moist.  Mouth - Examination of the oral cavity showed pink, healthy oral mucosa. No lesions or ulcerations noted.  The tongue was mobile " and midline, and the dentition were in good condition.    Throat - The walls of the oropharynx were smooth, pink, moist, symmetric, and had no lesions or ulcerations.  The tonsillar pillars and soft palate were symmetric.  The uvula was midline on elevation.  Neck - Normal midline excursion of the laryngotracheal complex during swallowing.  Full range of motion on passive movement.  Palpation of the occipital, submental, submandibular, internal jugular chain, and supraclavicular nodes did not demonstrate any abnormal lymph nodes or masses.  The carotid pulse was palpable bilaterally.  Palpation of the thyroid was soft and smooth, with no nodules or goiter appreciated.  The trachea was mobile and midline.  Nose - External contour is symmetric, no gross deflection or scars.  Nasal mucosa is pink and moist with no abnormal mucus.  The septum was midline and non-obstructive, turbinates of normal size and position.  No polyps, masses, or purulence noted on examination.    Procedure: Cerumen Disimpaction  Diagnosis: Cerumen Impaction    Procedure and Findings  Ears - On examination of the ears, I found that the  ears were impacted with dense cerumen obscuring visualization of the right TM.  Therefore, I positioned the patient and I used the binocular surgical microscope to assist in visualization of the affected ear(s).  I began by using a cerumen loop to gently lift the edges of the cerumen mass away from the walls of the external canal.  Once I did this, I was able to suction away fragments of wax and debris using suction.  Once the mass was loose enough, the entire plug was pulled from the canal with microforceps.  The tympanic membrane was intact without sign of perforation or middle ear effusion and minimal ear canal trauma.               Assessment - Sherley Quijano is a 27 year old female with right impacted cerumen on the lateral surface of the TM. I think this was mimicking a purulent effusion, so I think she can  stop antibiotics. She did seem to have some right partial serous effusion, but was able to autoinsufflate well. I advised her to return in 2 weeks with an audiogram. I reassured her that there is a good chance she will feel better with just the cleaning.        Dr. Rogers Nevarez MD  Otolaryngology  [facility]

## 2018-04-25 NOTE — NURSING NOTE
"Chief Complaint   Patient presents with     Ear Problem       Initial /69 (BP Location: Right arm, Patient Position: Chair, Cuff Size: Adult Regular)  Pulse 64  Temp 96.9  F (36.1  C) (Tympanic)  Ht 1.689 m (5' 6.5\")  Wt 77.6 kg (171 lb)  BMI 27.19 kg/m2 Estimated body mass index is 27.19 kg/(m^2) as calculated from the following:    Height as of this encounter: 1.689 m (5' 6.5\").    Weight as of this encounter: 77.6 kg (171 lb).  BP completed using cuff size: regular  Medications and allergies reviewed.      Luana BALDWIN CMA     "

## 2018-05-16 ENCOUNTER — OFFICE VISIT (OUTPATIENT)
Dept: AUDIOLOGY | Facility: CLINIC | Age: 28
End: 2018-05-16
Payer: COMMERCIAL

## 2018-05-16 ENCOUNTER — OFFICE VISIT (OUTPATIENT)
Dept: OTOLARYNGOLOGY | Facility: CLINIC | Age: 28
End: 2018-05-16
Payer: COMMERCIAL

## 2018-05-16 VITALS
TEMPERATURE: 98.4 F | DIASTOLIC BLOOD PRESSURE: 76 MMHG | SYSTOLIC BLOOD PRESSURE: 124 MMHG | WEIGHT: 171 LBS | BODY MASS INDEX: 27.19 KG/M2 | HEART RATE: 64 BPM

## 2018-05-16 DIAGNOSIS — H90.3 ASYMMETRICAL SENSORINEURAL HEARING LOSS: ICD-10-CM

## 2018-05-16 DIAGNOSIS — H69.93 DYSFUNCTION OF BOTH EUSTACHIAN TUBES: Primary | ICD-10-CM

## 2018-05-16 DIAGNOSIS — H90.3 SENSORINEURAL HEARING LOSS, ASYMMETRICAL: Primary | ICD-10-CM

## 2018-05-16 PROCEDURE — 92557 COMPREHENSIVE HEARING TEST: CPT | Performed by: AUDIOLOGIST

## 2018-05-16 PROCEDURE — 99213 OFFICE O/P EST LOW 20 MIN: CPT | Performed by: OTOLARYNGOLOGY

## 2018-05-16 PROCEDURE — 92567 TYMPANOMETRY: CPT | Performed by: AUDIOLOGIST

## 2018-05-16 PROCEDURE — 99207 ZZC NO CHARGE LOS: CPT | Performed by: AUDIOLOGIST

## 2018-05-16 ASSESSMENT — PAIN SCALES - GENERAL: PAINLEVEL: NO PAIN (0)

## 2018-05-16 NOTE — PROGRESS NOTES
AUDIOLOGY REPORT    SUBJECTIVE:  Sherley Quijano is a 27 year old female who was seen in the Audiology Clinic at [facility] for an audiologic evaluation, referred by DR. Nevarez.  The patient has been seen previously in this clinic for assessment. The patient reports difficulty hearing in groups and background noise. Patient also reports bilateral tinnitus since childhood. Patient reports occupational noise exposure for which she wears ear protection when she is able. The patient denies bilateral otalgia and bilateral drainage.     OBJECTIVE:  Otoscopic exam indicates ears are clear of cerumen bilaterally     Pure Tone Thresholds assessed using conventional audiometry with good  reliability from 250-8000 Hz bilaterally using circumaural headphones     RIGHT:  normal, mild and severe sensorineural hearing loss    LEFT:    normal and mild sensorineural hearing loss    Tympanogram:    RIGHT: restricted eardrum mobility with normal pressure peaks    LEFT:   restricted eardrum mobility with normal pressure peaks    Speech Reception Threshold:    RIGHT: 10 dB HL    LEFT:   15 dB HL  Word Recognition Score:     RIGHT: 96% at 50 dB HL using W22 recorded word list.    LEFT:   96% at 55 dB HL using W22 recorded word list.      ASSESSMENT:   Mild mid to high tone dip in hearing in the left ear with a mild to severe high frequency sensorineural hearing loss in the right ear.     Compared to patient's previous audiogram dated 1990, hearing has decreased bilaterally. Today s results were discussed with the patient in detail.     PLAN: It is recommended that the patient be seen by Dr. Nevarez for medical evaluation of their ears and hearing evaluation.  Patient was counseled regarding hearing loss and impact on communication. Reviewed need for ear protection when exposed to occupational and recreational noise.  Reviewed possible origins of tinnitus and strategies for management.    Please call this clinic  with questions regarding these results or recommendations.        Nettie Ndiaye M.A. ACE-AAA  Clinical audiologist [place] [facility] [id number]  5/16/2018

## 2018-05-16 NOTE — MR AVS SNAPSHOT
After Visit Summary   5/16/2018    Sherley Quijano    MRN: [id number]           Patient Information     Date Of Birth          1990        Visit Information        Provider Department      5/16/2018 12:00 PM Nettie Ndiaye AuD [facility]        Today's Diagnoses     Sensorineural hearing loss, asymmetrical    -  1       Follow-ups after your visit        Your next 10 appointments already scheduled     Jun 04, 2018 11:00 AM CDT   Return Visit with Nj Rosenberg MD   [facility] ([facility])    [address] [address] [phone]              Who to contact     If you have questions or need follow up information about today's clinic visit or your schedule please contact [facility] directly at [phone].  Normal or non-critical lab and imaging results will be communicated to you by MyChart, letter or phone within 4 business days after the clinic has received the results. If you do not hear from us within 7 days, please contact the clinic through MyChart or phone. If you have a critical or abnormal lab result, we will notify you by phone as soon as possible.  Submit refill requests through [employer] or call your pharmacy and they will forward the refill request to us. Please allow 3 business days for your refill to be completed.          Additional Information About Your Visit        MyChart Information     [employer] gives you secure access to your electronic health record. If you see a primary care provider, you can also send messages to your care team and make appointments. If you have questions, please call your primary care clinic.  If you do not have a primary care provider, please call [phone] and they will assist you.        Care EveryWhere ID     This is your Care EveryWhere ID. This could be used by other organizations to access your [place] medical records  [id number]         Blood Pressure from Last 3  Encounters:   05/16/18 124/76   04/25/18 125/69   04/16/18 116/70    Weight from Last 3 Encounters:   05/16/18 171 lb (77.6 kg)   04/25/18 171 lb (77.6 kg)   04/16/18 166 lb 3.6 oz (75.4 kg)              We Performed the Following     AUDIOGRAM/TYMPANOGRAM - INTERFACE     COMPREHENSIVE HEARING TEST     TYMPANOMETRY        Primary Care Provider Office Phone # Fax #    Mya Mcmahon [phone] [address] [place] [address]        Equal Access to Services     [facility]: Hadii aad ku hadasho Soomaali, waaxda luqadaha, qaybta kaalmada adeegyada, kimi wu haygregoria stevens . So [facility] [phone].    ATENCIÓN: Si habla español, tiene a roa disposición servicios gratuitos de asistencia lingüística. [facility] [phone].    We comply with applicable federal civil rights laws and [place] laws. We do not discriminate on the basis of race, color, national origin, age, disability, sex, sexual orientation, or gender identity.            Thank you!     Thank you for choosing [facility]  for your care. Our goal is always to provide you with excellent care. Hearing back from our patients is one way we can continue to improve our services. Please take a few minutes to complete the written survey that you may receive in the mail after your visit with us. Thank you!             Your Updated Medication List - Protect others around you: Learn how to safely use, store and throw away your medicines at www.disposemymeds.org.          This list is accurate as of 5/16/18 12:49 PM.  Always use your most recent med list.                   Brand Name Dispense Instructions for use Diagnosis    * albuterol 1.25 MG/3ML nebulizer solution    ACCUNEB    1 Box    Take 3 mLs by nebulization every 4 hours as needed for shortness of breath / dyspnea.    Asthma with acute exacerbation       * albuterol 108 (90 Base) MCG/ACT Inhaler    PROAIR HFA/PROVENTIL HFA/VENTOLIN HFA    1 Inhaler     Inhale 2 puffs into the lungs every 4 hours as needed for shortness of breath / dyspnea or wheezing    Mild intermittent asthma without complication       azelastine 0.05 % Soln ophthalmic solution    OPTIVAR    1 Bottle    Apply 1 drop to eye 2 times daily as needed    Allergic conjunctivitis, bilateral       Beclomethasone Dipropionate 80 MCG/ACT Aers Nasal Spray    QNASL    8.7 g    Spray 2 sprays into both nostrils daily    Chronic seasonal allergic rhinitis due to pollen, Chronic allergic rhinitis due to animal hair and dander, Allergic rhinitis due to dust mite       EPINEPHrine 0.3 MG/0.3ML injection 2-pack    AUVI-Q    0.6 mL    Inject 0.3 mLs (0.3 mg) into the muscle as needed for anaphylaxis    Chronic allergic rhinitis due to animal hair and dander, Chronic seasonal allergic rhinitis due to pollen, Mild intermittent asthma without complication, Latex sensitivity, Reaction to food, initial encounter       loratadine 10 MG tablet    CLARITIN    30 tablet    Take 1 tablet (10 mg) by mouth daily as needed for allergies    Urticaria       paragard intrauterine copper     1 each    1 each by Intrauterine route once for 1 dose        * Notice:  This list has 2 medication(s) that are the same as other medications prescribed for you. Read the directions carefully, and ask your doctor or other care provider to review them with you.

## 2018-05-16 NOTE — NURSING NOTE
"Initial /76 (BP Location: Right arm, Patient Position: Chair, Cuff Size: Adult Regular)  Pulse 64  Temp 98.4  F (36.9  C) (Oral)  Wt 77.6 kg (171 lb)  BMI 27.19 kg/m2 Estimated body mass index is 27.19 kg/(m^2) as calculated from the following:    Height as of 4/25/18: 1.689 m (5' 6.5\").    Weight as of this encounter: 77.6 kg (171 lb). .    Manda Chavez LPN    "

## 2018-05-16 NOTE — MR AVS SNAPSHOT
After Visit Summary   5/16/2018    Sherley Quijano    MRN: [id number]           Patient Information     Date Of Birth          1990        Visit Information        Provider Department      5/16/2018 12:30 PM Rogers Nevarez MD [facility]        Today's Diagnoses     Dysfunction of both eustachian tubes    -  1    Asymmetrical sensorineural hearing loss          Care Instructions    Per physician's instructions            Follow-ups after your visit        Additional Services     AUDIOLOGY ADULT REFERRAL       Your provider has referred you to: [facility]: [facility] [phone]   [email or web address]    Treatment:  Evaluation & Treatment  Specialty Testing:  Audiogram w/Tymps and Reflexes    Please be aware that coverage of these services is subject to the terms and limitations of your health insurance plan.  Call member services at your health plan with any benefit or coverage questions.      Please bring the following to your appointment:  >>   Any x-rays, CTs or MRIs which have been performed.  Contact the facility where they were done to arrange for  prior to your scheduled appointment.  Any new CT, MRI or other procedures ordered by your specialist must be performed at a [facility] facility or coordinated by your clinic's referral office.   >>   List of current medications  >>   This referral request   >>   Any documents/labs given to you for this referral                  Your next 10 appointments already scheduled     Jun 04, 2018 11:00 AM CDT   Return Visit with Nj Rosenberg MD   [facility] ([facility]    [address] [address] [phone]              Who to contact     If you have questions or need follow up information about today's clinic visit or your schedule please contact [facility] directly at [phone].  Normal or non-critical lab and imaging results  will be communicated to you by MyChart, letter or phone within 4 business days after the clinic has received the results. If you do not hear from us within 7 days, please contact the clinic through [employer] or phone. If you have a critical or abnormal lab result, we will notify you by phone as soon as possible.  Submit refill requests through [employer] or call your pharmacy and they will forward the refill request to us. Please allow 3 business days for your refill to be completed.          Additional Information About Your Visit        [employer] Information     [employer] gives you secure access to your electronic health record. If you see a primary care provider, you can also send messages to your care team and make appointments. If you have questions, please call your primary care clinic.  If you do not have a primary care provider, please call [phone] and they will assist you.        Care EveryWhere ID     This is your Care EveryWhere ID. This could be used by other organizations to access your [facility] medical records  [id number]        Your Vitals Were     Pulse Temperature BMI (Body Mass Index)             64 98.4  F (36.9  C) (Oral) 27.19 kg/m2          Blood Pressure from Last 3 Encounters:   05/16/18 124/76   04/25/18 125/69   04/16/18 116/70    Weight from Last 3 Encounters:   05/16/18 77.6 kg (171 lb)   04/25/18 77.6 kg (171 lb)   04/16/18 75.4 kg (166 lb 3.6 oz)              We Performed the Following     AUDIOLOGY ADULT REFERRAL        Primary Care Provider Office Phone # Fax #    Mya Mcmahon [phone] [address] [facility] [address]        Equal Access to Services     [facility] AH: Hadii aad ku hadasho Soraisa, waaxda luqadaha, qaybta kaalmada sean, kimi mendez. So [facility] [phone].    ATENCIÓN: Si habla español, tiene a roa disposición servicios gratuitos de asistencia lingüística. Llame al [phone].    We comply with  applicable federal civil rights laws and [place] laws. We do not discriminate on the basis of race, color, national origin, age, disability, sex, sexual orientation, or gender identity.            Thank you!     Thank you for choosing [facility]  for your care. Our goal is always to provide you with excellent care. Hearing back from our patients is one way we can continue to improve our services. Please take a few minutes to complete the written survey that you may receive in the mail after your visit with us. Thank you!             Your Updated Medication List - Protect others around you: Learn how to safely use, store and throw away your medicines at www.disposemymeds.org.          This list is accurate as of 5/16/18  4:47 PM.  Always use your most recent med list.                   Brand Name Dispense Instructions for use Diagnosis    * albuterol 1.25 MG/3ML nebulizer solution    ACCUNEB    1 Box    Take 3 mLs by nebulization every 4 hours as needed for shortness of breath / dyspnea.    Asthma with acute exacerbation       * albuterol 108 (90 Base) MCG/ACT Inhaler    PROAIR HFA/PROVENTIL HFA/VENTOLIN HFA    1 Inhaler    Inhale 2 puffs into the lungs every 4 hours as needed for shortness of breath / dyspnea or wheezing    Mild intermittent asthma without complication       azelastine 0.05 % Soln ophthalmic solution    OPTIVAR    1 Bottle    Apply 1 drop to eye 2 times daily as needed    Allergic conjunctivitis, bilateral       Beclomethasone Dipropionate 80 MCG/ACT Aers Nasal Spray    QNASL    8.7 g    Spray 2 sprays into both nostrils daily    Chronic seasonal allergic rhinitis due to pollen, Chronic allergic rhinitis due to animal hair and dander, Allergic rhinitis due to dust mite       EPINEPHrine 0.3 MG/0.3ML injection 2-pack    AUVI-Q    0.6 mL    Inject 0.3 mLs (0.3 mg) into the muscle as needed for anaphylaxis    Chronic allergic rhinitis due to animal hair and dander, Chronic seasonal  allergic rhinitis due to pollen, Mild intermittent asthma without complication, Latex sensitivity, Reaction to food, initial encounter       loratadine 10 MG tablet    CLARITIN    30 tablet    Take 1 tablet (10 mg) by mouth daily as needed for allergies    Urticaria       paragard intrauterine copper     1 each    1 each by Intrauterine route once for 1 dose        * Notice:  This list has 2 medication(s) that are the same as other medications prescribed for you. Read the directions carefully, and ask your doctor or other care provider to review them with you.

## 2018-05-16 NOTE — PROGRESS NOTES
History of Present Illness - Sherley Quijano is a 27 year old female who returns today with concerns about her right ear.  I saw her recently and removed some cerumen from the lateral surface of the tympanic membrane.  At that time, she was complaining mainly of feeling like she had water in her ear.  She reports that since that time she did have some occasional drainage out of her ear.  She also has been treating some ear eczema that was identified last time and feels this is getting better.  Her ears are not as itchy.  She currently feels that her hearing is normal.  She works as a  and does use hearing protection but realizes this is a noisy environment.  She denies any head trauma or firearm exposure.    Past Medical History -   Patient Active Problem List   Diagnosis     Mild intermittent asthma     Dysfunction of eustachian tube     Attention deficit disorder     Bipolar affective disorder (H)     Adverse reaction to viral vaccines     CARDIOVASCULAR SCREENING; LDL GOAL LESS THAN 130     Migraine headache     Prenatal care, first pregnancy     SGA (small for gestational age)     Supervision of normal first pregnancy     IUGR (intrauterine growth restriction) affecting care of mother, third trimester, fetus 1     Vaginal delivery     Chronic seasonal allergic rhinitis due to pollen     Chronic allergic rhinitis due to animal hair and dander     Allergic rhinitis due to dust mite     Allergic conjunctivitis, bilateral       Current Medications -   Current Outpatient Prescriptions:      paragard intrauterine copper, 1 each by Intrauterine route once for 1 dose, Disp: 1 each, Rfl:      albuterol (ACCUNEB) 1.25 MG/3ML nebulizer solution, Take 3 mLs by nebulization every 4 hours as needed for shortness of breath / dyspnea. (Patient not taking: Reported on 5/16/2018), Disp: 1 Box, Rfl: 3     albuterol (PROAIR HFA/PROVENTIL HFA/VENTOLIN HFA) 108 (90 Base) MCG/ACT Inhaler, Inhale 2 puffs into the lungs  every 4 hours as needed for shortness of breath / dyspnea or wheezing (Patient not taking: Reported on 5/16/2018), Disp: 1 Inhaler, Rfl: 0     azelastine (OPTIVAR) 0.05 % SOLN ophthalmic solution, Apply 1 drop to eye 2 times daily as needed (Patient not taking: Reported on 5/16/2018), Disp: 1 Bottle, Rfl: 1     Beclomethasone Dipropionate (QNASL) 80 MCG/ACT AERS Nasal Spray, Spray 2 sprays into both nostrils daily (Patient not taking: Reported on 5/16/2018), Disp: 8.7 g, Rfl: 1     EPINEPHrine (AUVI-Q) 0.3 MG/0.3ML injection 2-pack, Inject 0.3 mLs (0.3 mg) into the muscle as needed for anaphylaxis (Patient not taking: Reported on 5/16/2018), Disp: 0.6 mL, Rfl: 1     loratadine (CLARITIN) 10 MG tablet, Take 1 tablet (10 mg) by mouth daily as needed for allergies (Patient not taking: Reported on 5/16/2018), Disp: 30 tablet, Rfl: 3     [DISCONTINUED] Albuterol Sulfate (PROAIR HFA) 108 (90 BASE) MCG/ACT AERS, Inhale 2 puffs into the lungs 4 times daily as needed., Disp: 3 Inhaler, Rfl: 3    Allergies -   Allergies   Allergen Reactions     Kiwi Anaphylaxis     Throat swelling, difficulty breathing     Lanolin Hives     Latex Hives     Penicillins Rash       Social History -   Social History     Social History     Marital status:      Spouse name: N/A     Number of children: 0     Years of education: 12.5+     Occupational History      [employer]     Social History Main Topics     Smoking status: Former Smoker     Packs/day: 0.25     Types: Cigarettes     Quit date: 10/2017     Smokeless tobacco: Never Used      Comment: Needle using ecgis since october 2018     Alcohol use 0.0 oz/week     0 Standard drinks or equivalent per week      Comment: rare     Drug use: No     Sexual activity: Yes     Partners: Male     Other Topics Concern     Parent/Sibling W/ Cabg, Mi Or Angioplasty Before 65f 55m? No     Social History Narrative    April 16, 2018    ENVIRONMENTAL HISTORY: [place] The home is heated with a wood stove and radiant heat. They do not have central air conditioning. The patient's bedroom is furnished with hard sánchez in bedroom, allergen mattress cover, allergen pillowcase cover, fabric window coverings and 2 rugs.  Pets inside the house include 1 cat. There is  history of cockroach or mice infestation. There is 1 smoker in the house.  The house does not have a damp basement.           Family History -   Family History   Problem Relation Age of Onset     C.A.D. Maternal Grandfather      MI     HEART DISEASE Maternal Grandfather      Breast Cancer Maternal Grandmother      Depression Maternal Grandmother      Breast Cancer Paternal Grandmother      Alcohol/Drug Father      Allergies Father      Allergies Sister      Neurologic Disorder Sister      Narcolepsy     Substance Abuse Sister      recovered drugs     Substance Abuse Mother      recovered alcohol     Depression Mother      Other - See Comments Other      Heart Attack       Review of Systems - As per HPI and PMHx, otherwise 7 system review of the head and neck negative. 10+ system review negative.    Exam:  /76 (BP Location: Right arm, Patient Position: Chair, Cuff Size: Adult Regular)  Pulse 64  Temp 98.4  F (36.9  C) (Oral)  Wt 77.6 kg (171 lb)  BMI 27.19 kg/m2  General - The patient is well nourished and well developed, and appears to have good nutritional status.  Alert and oriented to person and place, answers questions and cooperates with examination appropriately.   Head and Face - Normocephalic and atraumatic, with no gross asymmetry noted of the contour of the facial features.  The facial nerve is intact, with strong symmetric movements.  Eyes - Extraocular movements intact.  Sclera were not icteric or injected, conjunctiva were pink and moist.  Ears-examination of bilateral ears demonstrate the tympanic membranes are intact with no evidence of middle  ear disease.  Bilateral ear canals appear healthy.    Audiogram 05/16/18  Pure Tone Thresholds assessed using conventional audiometry with good  reliability from 250-8000 Hz bilaterally using circumaural headphones     RIGHT:  normal, mild and severe sensorineural hearing loss    LEFT:    normal and mild sensorineural hearing loss     Tympanogram:    RIGHT: restricted eardrum mobility with normal pressure peaks    LEFT:   restricted eardrum mobility with normal pressure peaks     Speech Reception Threshold:    RIGHT: 10 dB HL    LEFT:   15 dB HL  Word Recognition Score:     RIGHT: 96% at 50 dB HL using W22 recorded word list.    LEFT:   96% at 55 dB HL using W22 recorded word list.      A/P - Sherley Quijano is a 27 year old female with normal appearing ears.  She does have some high-frequency hearing asymmetry.  I do not think this is of a severe degree to warrant MRI at this point.  I would prefer that she continue to monitor her hearing probably in about 2 years.  I discussed with her the importance of hearing protection.  I reassured her there is no sign of any tympanic membrane perforation today and there is no evidence of middle ear effusion.  Return if there is any concerns or drainage in the future so that I may ascertain the source of this drainage.      Dr. Rogers Nevarez MD  Otolaryngology  [facility]

## 2018-05-16 NOTE — LETTER
5/16/2018         RE: Sherley Quijano  [address] [place] [id number]        Dear Colleague,    Thank you for referring your patient, Sherley Quijano, to the [facility]. Please see a copy of my visit note below.    History of Present Illness - Sherley Quijano is a 27 year old female who returns today with concerns about her right ear.  I saw her recently and removed some cerumen from the lateral surface of the tympanic membrane.  At that time, she was complaining mainly of feeling like she had water in her ear.  She reports that since that time she did have some occasional drainage out of her ear.  She also has been treating some ear eczema that was identified last time and feels this is getting better.  Her ears are not as itchy.  She currently feels that her hearing is normal.  She works as a  and does use hearing protection but realizes this is a noisy environment.  She denies any head trauma or firearm exposure.    Past Medical History -   Patient Active Problem List   Diagnosis     Mild intermittent asthma     Dysfunction of eustachian tube     Attention deficit disorder     Bipolar affective disorder (H)     Adverse reaction to viral vaccines     CARDIOVASCULAR SCREENING; LDL GOAL LESS THAN 130     Migraine headache     Prenatal care, first pregnancy     SGA (small for gestational age)     Supervision of normal first pregnancy     IUGR (intrauterine growth restriction) affecting care of mother, third trimester, fetus 1     Vaginal delivery     Chronic seasonal allergic rhinitis due to pollen     Chronic allergic rhinitis due to animal hair and dander     Allergic rhinitis due to dust mite     Allergic conjunctivitis, bilateral       Current Medications -   Current Outpatient Prescriptions:      paragard intrauterine copper, 1 each by Intrauterine route once for 1 dose, Disp: 1 each, Rfl:      albuterol (ACCUNEB) 1.25 MG/3ML nebulizer solution, Take 3 mLs by nebulization every  4 hours as needed for shortness of breath / dyspnea. (Patient not taking: Reported on 5/16/2018), Disp: 1 Box, Rfl: 3     albuterol (PROAIR HFA/PROVENTIL HFA/VENTOLIN HFA) 108 (90 Base) MCG/ACT Inhaler, Inhale 2 puffs into the lungs every 4 hours as needed for shortness of breath / dyspnea or wheezing (Patient not taking: Reported on 5/16/2018), Disp: 1 Inhaler, Rfl: 0     azelastine (OPTIVAR) 0.05 % SOLN ophthalmic solution, Apply 1 drop to eye 2 times daily as needed (Patient not taking: Reported on 5/16/2018), Disp: 1 Bottle, Rfl: 1     Beclomethasone Dipropionate (QNASL) 80 MCG/ACT AERS Nasal Spray, Spray 2 sprays into both nostrils daily (Patient not taking: Reported on 5/16/2018), Disp: 8.7 g, Rfl: 1     EPINEPHrine (AUVI-Q) 0.3 MG/0.3ML injection 2-pack, Inject 0.3 mLs (0.3 mg) into the muscle as needed for anaphylaxis (Patient not taking: Reported on 5/16/2018), Disp: 0.6 mL, Rfl: 1     loratadine (CLARITIN) 10 MG tablet, Take 1 tablet (10 mg) by mouth daily as needed for allergies (Patient not taking: Reported on 5/16/2018), Disp: 30 tablet, Rfl: 3     [DISCONTINUED] Albuterol Sulfate (PROAIR HFA) 108 (90 BASE) MCG/ACT AERS, Inhale 2 puffs into the lungs 4 times daily as needed., Disp: 3 Inhaler, Rfl: 3    Allergies -   Allergies   Allergen Reactions     Kiwi Anaphylaxis     Throat swelling, difficulty breathing     Lanolin Hives     Latex Hives     Penicillins Rash       Social History -   Social History     Social History     Marital status:      Spouse name: N/A     Number of children: 0     Years of education: 12.5+     Occupational History      [employer]     Social History Main Topics     Smoking status: Former Smoker     Packs/day: 0.25     Types: Cigarettes     Quit date: 10/2017     Smokeless tobacco: Never Used      Comment: Needle using ecgis since october 2018     Alcohol use 0.0 oz/week     0 Standard drinks or equivalent per week      Comment:  rare     Drug use: No     Sexual activity: Yes     Partners: Male     Other Topics Concern     Parent/Sibling W/ Cabg, Mi Or Angioplasty Before 65f 55m? No     Social History Narrative    April 16, 2018    ENVIRONMENTAL HISTORY: [place] The home is heated with a wood stove and radiant heat. They do not have central air conditioning. The patient's bedroom is furnished with hard sánchez in bedroom, allergen mattress cover, allergen pillowcase cover, fabric window coverings and 2 rugs.  Pets inside the house include 1 cat. There is  history of cockroach or mice infestation. There is 1 smoker in the house.  The house does not have a damp basement.           Family History -   Family History   Problem Relation Age of Onset     C.A.D. Maternal Grandfather      MI     HEART DISEASE Maternal Grandfather      Breast Cancer Maternal Grandmother      Depression Maternal Grandmother      Breast Cancer Paternal Grandmother      Alcohol/Drug Father      Allergies Father      Allergies Sister      Neurologic Disorder Sister      Narcolepsy     Substance Abuse Sister      recovered drugs     Substance Abuse Mother      recovered alcohol     Depression Mother      Other - See Comments Other      Heart Attack       Review of Systems - As per HPI and PMHx, otherwise 7 system review of the head and neck negative. 10+ system review negative.    Exam:  /76 (BP Location: Right arm, Patient Position: Chair, Cuff Size: Adult Regular)  Pulse 64  Temp 98.4  F (36.9  C) (Oral)  Wt 77.6 kg (171 lb)  BMI 27.19 kg/m2  General - The patient is well nourished and well developed, and appears to have good nutritional status.  Alert and oriented to person and place, answers questions and cooperates with examination appropriately.   Head and Face - Normocephalic and atraumatic, with no gross asymmetry noted of the contour of the facial features.  The facial nerve is intact, with strong  symmetric movements.  Eyes - Extraocular movements intact.  Sclera were not icteric or injected, conjunctiva were pink and moist.  Ears-examination of bilateral ears demonstrate the tympanic membranes are intact with no evidence of middle ear disease.  Bilateral ear canals appear healthy.    Audiogram 05/16/18  Pure Tone Thresholds assessed using conventional audiometry with good  reliability from 250-8000 Hz bilaterally using circumaural headphones     RIGHT:  normal, mild and severe sensorineural hearing loss    LEFT:    normal and mild sensorineural hearing loss     Tympanogram:    RIGHT: restricted eardrum mobility with normal pressure peaks    LEFT:   restricted eardrum mobility with normal pressure peaks     Speech Reception Threshold:    RIGHT: 10 dB HL    LEFT:   15 dB HL  Word Recognition Score:     RIGHT: 96% at 50 dB HL using W22 recorded word list.    LEFT:   96% at 55 dB HL using W22 recorded word list.      A/P - Sherley Quijano is a 27 year old female with normal appearing ears.  She does have some high-frequency hearing asymmetry.  I do not think this is of a severe degree to warrant MRI at this point.  I would prefer that she continue to monitor her hearing probably in about 2 years.  I discussed with her the importance of hearing protection.  I reassured her there is no sign of any tympanic membrane perforation today and there is no evidence of middle ear effusion.  Return if there is any concerns or drainage in the future so that I may ascertain the source of this drainage.      Dr. Rogers Nevarez MD  Otolaryngology  [facility]      Again, thank you for allowing me to participate in the care of your patient.        Sincerely,        Rogers Nevarez MD

## 2018-06-06 ENCOUNTER — OFFICE VISIT (OUTPATIENT)
Dept: FAMILY MEDICINE | Facility: CLINIC | Age: 28
End: 2018-06-06
Payer: COMMERCIAL

## 2018-06-06 VITALS
DIASTOLIC BLOOD PRESSURE: 62 MMHG | TEMPERATURE: 98.5 F | SYSTOLIC BLOOD PRESSURE: 110 MMHG | RESPIRATION RATE: 14 BRPM | HEART RATE: 68 BPM | WEIGHT: 171 LBS | BODY MASS INDEX: 27.19 KG/M2

## 2018-06-06 DIAGNOSIS — Z11.3 ROUTINE SCREENING FOR STI (SEXUALLY TRANSMITTED INFECTION): Primary | ICD-10-CM

## 2018-06-06 PROCEDURE — 99213 OFFICE O/P EST LOW 20 MIN: CPT | Performed by: NURSE PRACTITIONER

## 2018-06-06 PROCEDURE — 87340 HEPATITIS B SURFACE AG IA: CPT | Performed by: NURSE PRACTITIONER

## 2018-06-06 PROCEDURE — 86780 TREPONEMA PALLIDUM: CPT | Performed by: NURSE PRACTITIONER

## 2018-06-06 PROCEDURE — 36415 COLL VENOUS BLD VENIPUNCTURE: CPT | Performed by: NURSE PRACTITIONER

## 2018-06-06 PROCEDURE — 87389 HIV-1 AG W/HIV-1&-2 AB AG IA: CPT | Performed by: NURSE PRACTITIONER

## 2018-06-06 PROCEDURE — 87491 CHLMYD TRACH DNA AMP PROBE: CPT | Performed by: NURSE PRACTITIONER

## 2018-06-06 PROCEDURE — 87591 N.GONORRHOEAE DNA AMP PROB: CPT | Performed by: NURSE PRACTITIONER

## 2018-06-06 PROCEDURE — 86803 HEPATITIS C AB TEST: CPT | Performed by: NURSE PRACTITIONER

## 2018-06-06 NOTE — PROGRESS NOTES
SUBJECTIVE:   Sherley Quijano is a 27 year old female who presents to clinic today for the following health issues:    Concern - STD screening  Onset: na    Description:   Patient is requesting STD screening.  New partner/ exposure possibly    Intensity: mild    Progression of Symptoms:  same    Accompanying Signs & Symptoms:  NA     Previous history of similar problem:   NA    Precipitating factors:   Worsened by: NA    Alleviating factors:  Improved by: Na    Therapies Tried and outcome: NA    Potential exposure to syphilis-new partner reported that she had syphilis   No symptoms    Problem list and histories reviewed & adjusted, as indicated.  Additional history: as documented    Patient Active Problem List   Diagnosis     Mild intermittent asthma     Dysfunction of eustachian tube     Attention deficit disorder     Bipolar affective disorder (H)     Adverse reaction to viral vaccines     CARDIOVASCULAR SCREENING; LDL GOAL LESS THAN 130     Migraine headache     Prenatal care, first pregnancy     SGA (small for gestational age)     Supervision of normal first pregnancy     IUGR (intrauterine growth restriction) affecting care of mother, third trimester, fetus 1     Vaginal delivery     Chronic seasonal allergic rhinitis due to pollen     Chronic allergic rhinitis due to animal hair and dander     Allergic rhinitis due to dust mite     Allergic conjunctivitis, bilateral     Past Surgical History:   Procedure Laterality Date     D & C  2006    TAB     MOUTH SURGERY  2008    wisdom teeth       Social History   Substance Use Topics     Smoking status: Former Smoker     Packs/day: 0.25     Types: Cigarettes     Quit date: 10/2017     Smokeless tobacco: Never Used      Comment: Needle using ecgis since october 2018     Alcohol use 0.0 oz/week     0 Standard drinks or equivalent per week      Comment: rare     Family History   Problem Relation Age of Onset     C.A.D. Maternal Grandfather      MI     HEART DISEASE  Maternal Grandfather      Breast Cancer Maternal Grandmother      Depression Maternal Grandmother      Breast Cancer Paternal Grandmother      Alcohol/Drug Father      Allergies Father      Allergies Sister      Neurologic Disorder Sister      Narcolepsy     Substance Abuse Sister      recovered drugs     Substance Abuse Mother      recovered alcohol     Depression Mother      Other - See Comments Other      Heart Attack         Current Outpatient Prescriptions   Medication Sig Dispense Refill     paragard intrauterine copper 1 each by Intrauterine route once for 1 dose 1 each      albuterol (ACCUNEB) 1.25 MG/3ML nebulizer solution Take 3 mLs by nebulization every 4 hours as needed for shortness of breath / dyspnea. (Patient not taking: Reported on 5/16/2018) 1 Box 3     albuterol (PROAIR HFA/PROVENTIL HFA/VENTOLIN HFA) 108 (90 Base) MCG/ACT Inhaler Inhale 2 puffs into the lungs every 4 hours as needed for shortness of breath / dyspnea or wheezing (Patient not taking: Reported on 5/16/2018) 1 Inhaler 0     azelastine (OPTIVAR) 0.05 % SOLN ophthalmic solution Apply 1 drop to eye 2 times daily as needed (Patient not taking: Reported on 5/16/2018) 1 Bottle 1     Beclomethasone Dipropionate (QNASL) 80 MCG/ACT AERS Nasal Spray Spray 2 sprays into both nostrils daily (Patient not taking: Reported on 5/16/2018) 8.7 g 1     EPINEPHrine (AUVI-Q) 0.3 MG/0.3ML injection 2-pack Inject 0.3 mLs (0.3 mg) into the muscle as needed for anaphylaxis (Patient not taking: Reported on 5/16/2018) 0.6 mL 1     loratadine (CLARITIN) 10 MG tablet Take 1 tablet (10 mg) by mouth daily as needed for allergies (Patient not taking: Reported on 5/16/2018) 30 tablet 3     Allergies   Allergen Reactions     Kiwi Anaphylaxis     Throat swelling, difficulty breathing     Lanolin Hives     Latex Hives     Penicillins Rash     Labs reviewed in EPIC    Reviewed and updated as needed this visit by clinical staff  Tobacco  Allergies  Meds  Med Hx  Surg  Hx  Fam Hx  Soc Hx      Reviewed and updated as needed this visit by Provider         ROS:  Constitutional, HEENT, cardiovascular, pulmonary, gi and gu systems are negative, except as otherwise noted.    OBJECTIVE:     /62 (BP Location: Right arm, Cuff Size: Adult Regular)  Pulse 68  Temp 98.5  F (36.9  C) (Tympanic)  Resp 14  Wt 171 lb (77.6 kg)  BMI 27.19 kg/m2  Body mass index is 27.19 kg/(m^2).  GENERAL: healthy, alert and no distress  PSYCH: mentation appears normal, affect normal/bright    Diagnostic Test Results:  none     ASSESSMENT/PLAN:     1. Routine screening for STI (sexually transmitted infection)  Labs pending.  Plan pending results.  - Chlamydia trachomatis PCR  - Hepatitis B surface antigen  - Hepatitis C antibody  - HIV Antigen Antibody Combo  - Neisseria gonorrhoeae PCR  - Treponema Abs w Reflex to RPR and Titer    Home care instructions were reviewed with the patient. The risks, benefits and treatment options of prescribed medications or other treatments have been discussed with the patient. The patient verbalized their understanding and should call or follow up if no improvement or if they develop further problems.    MASHA Orellana [facility]

## 2018-06-06 NOTE — MR AVS SNAPSHOT
After Visit Summary   6/6/2018    Sherley Quijano    MRN: [id number]           Patient Information     Date Of Birth          1990        Visit Information        Provider Department      6/6/2018 10:40 AM Cyndee Rzizo APRN CNP [facility]        Today's Diagnoses     Routine screening for STI (sexually transmitted infection)    -  1       Follow-ups after your visit        Who to contact     If you have questions or need follow up information about today's clinic visit or your schedule please contact [facility] directly at [phone].  Normal or non-critical lab and imaging results will be communicated to you by Hello Chairhart, letter or phone within 4 business days after the clinic has received the results. If you do not hear from us within 7 days, please contact the clinic through Hello Chairhart or phone. If you have a critical or abnormal lab result, we will notify you by phone as soon as possible.  Submit refill requests through [employer] or call your pharmacy and they will forward the refill request to us. Please allow 3 business days for your refill to be completed.          Additional Information About Your Visit        MyChart Information     [employer] gives you secure access to your electronic health record. If you see a primary care provider, you can also send messages to your care team and make appointments. If you have questions, please call your primary care clinic.  If you do not have a primary care provider, please call [phone] and they will assist you.        Care EveryWhere ID     This is your Care EveryWhere ID. This could be used by other organizations to access your [facility] medical records  [id number]        Your Vitals Were     Pulse Temperature Respirations BMI (Body Mass Index)          68 98.5  F (36.9  C) (Tympanic) 14 27.19 kg/m2         Blood Pressure from Last 3 Encounters:   06/06/18 110/62   05/16/18 124/76   04/25/18 125/69     Weight from Last 3 Encounters:   06/06/18 171 lb (77.6 kg)   05/16/18 171 lb (77.6 kg)   04/25/18 171 lb (77.6 kg)              We Performed the Following     Chlamydia trachomatis PCR     Hepatitis B surface antigen     Hepatitis C antibody     HIV Antigen Antibody Combo     Neisseria gonorrhoeae PCR     Treponema Abs w Reflex to RPR and Titer          Today's Medication Changes          These changes are accurate as of 6/6/18 12:34 PM.  If you have any questions, ask your nurse or doctor.               These medicines have changed or have updated prescriptions.        Dose/Directions    albuterol 1.25 MG/3ML nebulizer solution   Commonly known as:  ACCUNEB   This may have changed:  Another medication with the same name was removed. Continue taking this medication, and follow the directions you see here.   Used for:  Asthma with acute exacerbation   Changed by:  Cyndee Rizzo APRN CNP        Dose:  1 ampule   Take 3 mLs by nebulization every 4 hours as needed for shortness of breath / dyspnea.   Quantity:  1 Box   Refills:  3         Stop taking these medicines if you haven't already. Please contact your care team if you have questions.     azelastine 0.05 % Soln ophthalmic solution   Commonly known as:  OPTIVAR   Stopped by:  Cyndee Rizzo APRN CNP                    Primary Care Provider Office Phone # Fax #    MASHA Jacob [phone] [address] [place] [id number]        Equal Access to Services     [facility]: Hadii lidya christy hadasho Soomaali, waaxda luqadaha, qaybta kaalmada adeluis albertoyada, kimi stevens . So [facility] [phone].    ATENCIÓN: Si habla español, tiene a roa disposición servicios gratuitos de asistencia lingüística. Llame al [phone].    We comply with applicable federal civil rights laws and [place] laws. We do not discriminate on the basis of race, color, national origin, age, disability, sex, sexual  orientation, or gender identity.            Thank you!     Thank you for choosing [facility]  for your care. Our goal is always to provide you with excellent care. Hearing back from our patients is one way we can continue to improve our services. Please take a few minutes to complete the written survey that you may receive in the mail after your visit with us. Thank you!             Your Updated Medication List - Protect others around you: Learn how to safely use, store and throw away your medicines at www.disposemymeds.org.          This list is accurate as of 6/6/18 12:34 PM.  Always use your most recent med list.                   Brand Name Dispense Instructions for use Diagnosis    albuterol 1.25 MG/3ML nebulizer solution    ACCUNEB    1 Box    Take 3 mLs by nebulization every 4 hours as needed for shortness of breath / dyspnea.    Asthma with acute exacerbation       Beclomethasone Dipropionate 80 MCG/ACT Aers Nasal Spray    QNASL    8.7 g    Spray 2 sprays into both nostrils daily    Chronic seasonal allergic rhinitis due to pollen, Chronic allergic rhinitis due to animal hair and dander, Allergic rhinitis due to dust mite       EPINEPHrine 0.3 MG/0.3ML injection 2-pack    AUVI-Q    0.6 mL    Inject 0.3 mLs (0.3 mg) into the muscle as needed for anaphylaxis    Chronic allergic rhinitis due to animal hair and dander, Chronic seasonal allergic rhinitis due to pollen, Mild intermittent asthma without complication, Latex sensitivity, Reaction to food, initial encounter       loratadine 10 MG tablet    CLARITIN    30 tablet    Take 1 tablet (10 mg) by mouth daily as needed for allergies    Urticaria       paragard intrauterine copper     1 each    1 each by Intrauterine route once for 1 dose

## 2018-06-06 NOTE — NURSING NOTE
"Chief Complaint   Patient presents with     STD     Requesting screening        Initial /62 (BP Location: Right arm, Cuff Size: Adult Regular)  Pulse 68  Temp 98.5  F (36.9  C) (Tympanic)  Resp 14  Wt 171 lb (77.6 kg)  BMI 27.19 kg/m2 Estimated body mass index is 27.19 kg/(m^2) as calculated from the following:    Height as of 4/25/18: 5' 6.5\" (1.689 m).    Weight as of this encounter: 171 lb (77.6 kg).      Health Maintenance that is potentially due pending provider review:  NONE    n/a    Is there anyone who you would like to be able to receive your results? Not Applicable  If yes have patient fill out ELIZA  Leonard Puckett M.A.          "

## 2018-06-07 LAB
C TRACH DNA SPEC QL NAA+PROBE: NEGATIVE
HBV SURFACE AG SERPL QL IA: NONREACTIVE
HCV AB SERPL QL IA: NONREACTIVE
HIV 1+2 AB+HIV1 P24 AG SERPL QL IA: NONREACTIVE
N GONORRHOEA DNA SPEC QL NAA+PROBE: NEGATIVE
SPECIMEN SOURCE: NORMAL
SPECIMEN SOURCE: NORMAL
T PALLIDUM AB SER QL: NONREACTIVE

## 2018-08-20 ENCOUNTER — TELEPHONE (OUTPATIENT)
Dept: OBGYN | Facility: CLINIC | Age: 28
End: 2018-08-20

## 2018-08-20 NOTE — TELEPHONE ENCOUNTER
Return call to patient.  Patient reports she would like her IUD removed today.  Reports stabbing pain at times with it.    Offered office visit tomorrow morning here, offered [facility] clinic appointment today at 1210 or  recommended checking with FP today for appointment.    Patient planning to check with Emely Helm for appointment today. She will call back if further scheduling with [facility] if necessary.    Patient in agreement and reports understanding.    Dianna Ely   [facility] Clinic  RN

## 2018-09-27 ENCOUNTER — OFFICE VISIT (OUTPATIENT)
Dept: FAMILY MEDICINE | Facility: CLINIC | Age: 28
End: 2018-09-27
Payer: COMMERCIAL

## 2018-09-27 VITALS
HEART RATE: 80 BPM | RESPIRATION RATE: 14 BRPM | TEMPERATURE: 97.3 F | BODY MASS INDEX: 27.5 KG/M2 | DIASTOLIC BLOOD PRESSURE: 82 MMHG | WEIGHT: 173 LBS | SYSTOLIC BLOOD PRESSURE: 138 MMHG | OXYGEN SATURATION: 99 %

## 2018-09-27 DIAGNOSIS — L85.3 DRY SKIN: ICD-10-CM

## 2018-09-27 DIAGNOSIS — Z30.011 ENCOUNTER FOR INITIAL PRESCRIPTION OF CONTRACEPTIVE PILLS: Primary | ICD-10-CM

## 2018-09-27 DIAGNOSIS — S16.1XXA CERVICAL STRAIN, INITIAL ENCOUNTER: ICD-10-CM

## 2018-09-27 DIAGNOSIS — Z80.8 FAMILY HISTORY OF THYROID CANCER: ICD-10-CM

## 2018-09-27 LAB — TSH SERPL DL<=0.005 MIU/L-ACNC: 1.82 MU/L (ref 0.4–4)

## 2018-09-27 PROCEDURE — 84443 ASSAY THYROID STIM HORMONE: CPT | Performed by: NURSE PRACTITIONER

## 2018-09-27 PROCEDURE — 99214 OFFICE O/P EST MOD 30 MIN: CPT | Performed by: NURSE PRACTITIONER

## 2018-09-27 PROCEDURE — 36415 COLL VENOUS BLD VENIPUNCTURE: CPT | Performed by: NURSE PRACTITIONER

## 2018-09-27 RX ORDER — NORELGESTROMIN AND ETHINYL ESTRADIOL 35; 150 UG/MG; UG/MG
PATCH TRANSDERMAL
Qty: 9 PATCH | Refills: 3 | Status: SHIPPED | OUTPATIENT
Start: 2018-09-27 | End: 2020-01-13

## 2018-09-27 RX ORDER — CYCLOBENZAPRINE HCL 10 MG
10 TABLET ORAL 3 TIMES DAILY PRN
Qty: 30 TABLET | Refills: 0 | COMMUNITY
Start: 2018-09-27 | End: 2020-01-13
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
## 2018-09-27 NOTE — PATIENT INSTRUCTIONS
Birth Control Choices  Birth control keeps you from getting pregnant during sex. There are many types of birth control. Some are more effective than others. New types are being tested all the time. Your healthcare provider can help you decide which type of birth control is best for you. But no matter which type you choose, you and your partner must use it the right way each time you have sex. Some of the most common types are described below.  Condom  A condom is a thin covering that fits over the penis. (The female condom fits inside the vagina.) A condom catches sperm that come out of the penis during sex.  Spermicide  Spermicide is a gel, foam, cream, tablet, or sponge (although the sponge has barrier properties in addition to spermicidal properties). It is put in the vagina before sex to kill sperm.  Diaphragm and cervical cap  Diaphragms and cervical caps are round rubber cups that keep sperm out of the uterus. They also hold spermicide in place.  Intrauterine device (IUD)  An IUD is a small device that is placed in the uterus by a healthcare provider to prevent pregnancy.  The pill  The birth control pill is taken daily. It contains hormones that stop a woman s body from releasing an egg each month.  Other hormones  Hormones that stop a woman s egg from being released each month can be delivered in other ways. These include injection, implant, patch, or vaginal ring.  Other choices  Here are additional birth control methods:    Male sterilization (vasectomy) is surgery that ties off or cuts the tubes called the vas deferens in the testes. This is done so sperm cannot come out when the man ejaculates.    Female sterilization is surgery to block or cut the woman's fallopian tubes. It can be done by placing an instrument into the uterus (hysteroscopy) to insert small coils into the fallopian tubes (Essure). It can also be done through the belly (laparoscopy) to block the tubes or remove part or all of the  tubes.    Withdrawal method is when the male doesn't ejaculate into the vagina, but rather withdraws his penis just before he ejaculates.    Fertility awareness method is when a woman keeps track of her fertile days. She only has intercourse at times when she is not likely to get pregnant.  Emergency contraception (EC)  Emergency contraception can help prevent pregnancy after unprotected sex. Hormone pills ( morning after pills ) are available over the counter to anyone. A second type of EC, a copper IUD, needs to be inserted by a trained healthcare provider. Either type of EC can be used up to 5 days after sex, but it should be taken as soon as possible. The sooner it is used after unprotected sex, the more likely it is to be effective. EC will not work if you re already pregnant.  Things to consider  Think about the following:    Choose a type of birth control that is easy for you to use.    Read the package and follow your health care provider's instructions to learn to use your birth control the right way.    Most forms of birth control do not protect you from sexually transmitted infections (STIs). To protect against STIs, always use a latex condom. If you are allergic to latex, a nonlatex condom may provide some protection.   Date Last Reviewed: 12/1/2016 2000-2017 [employer]. [address] [place] [address], [place] [address]. All rights reserved. This information is not intended as a substitute for professional medical care. Always follow your healthcare professional's instructions.        Birth Control: The Pill    Birth control pills contain hormones that help prevent pregnancy. The pills are prescribed by your healthcare provider. There are many types of birth control pills available. If you have side effects from one type of pill, tell your healthcare provider. He or she may be able to prescribe a pill that works better for you.  Pregnancy rates  Talk to your healthcare provider about the  effectiveness of this birth control method.  Using the pill    Take one pill daily. Take it at around the same time each day.    Follow your healthcare provider s guidelines on when to start your first pack of pills. You may need to use another form of birth control for a week or more after you start.    Know what to do if you forget to take a pill. (Consult your healthcare provider or check the package.) If you miss more than one pill, you may need to use a backup method of birth control for a week or more.  Pros    Low pregnancy rate    No interruption to sex    Easy to use    Can help make periods more regular    May lower your risk of ovarian cysts and certain cancers    May decrease menstrual cramps, menstrual flow, and acne  Cons    Does not protect against sexually transmitted infection (STIs)    Requires taking a pill on time each day    May not work as well when taken with certain other medicines (check with your pharmacist)    May cause side effects such as nausea, irregular bleeding, headaches, breast tenderness, fatigue, or mood changes (these often go away within 3 months)    May increase the risk of blood clots, heart attack, and stroke  The pill may not be for you  The pill may not be for you if:    You are a smoker and over age 35    You have high blood pressure or gallbladder, liver, cerebrovascular  or heart disease    You have diabetes, migraines, blood clot in the vein or artery, lupus, depression, certain lipid disorders, or take medicines that interfere with the pill  In these cases, discuss the risks with your healthcare provider.  Date Last Reviewed: 3/1/2017    8210-6638 [employer]. [address] [place] [address], [place] [address]. All rights reserved. This information is not intended as a substitute for professional medical care. Always follow your healthcare professional's instructions.

## 2018-09-27 NOTE — MR AVS SNAPSHOT
After Visit Summary   9/27/2018    Sherley Quijano    MRN: [id number]           Patient Information     Date Of Birth          1990        Visit Information        Provider Department      9/27/2018 3:20 PM Sandra Nunes APRN [facility]        Today's Diagnoses     Encounter for initial prescription of contraceptive pills    -  1    Cervical strain, initial encounter          Care Instructions      Birth Control Choices  Birth control keeps you from getting pregnant during sex. There are many types of birth control. Some are more effective than others. New types are being tested all the time. Your healthcare provider can help you decide which type of birth control is best for you. But no matter which type you choose, you and your partner must use it the right way each time you have sex. Some of the most common types are described below.  Condom  A condom is a thin covering that fits over the penis. (The female condom fits inside the vagina.) A condom catches sperm that come out of the penis during sex.  Spermicide  Spermicide is a gel, foam, cream, tablet, or sponge (although the sponge has barrier properties in addition to spermicidal properties). It is put in the vagina before sex to kill sperm.  Diaphragm and cervical cap  Diaphragms and cervical caps are round rubber cups that keep sperm out of the uterus. They also hold spermicide in place.  Intrauterine device (IUD)  An IUD is a small device that is placed in the uterus by a healthcare provider to prevent pregnancy.  The pill  The birth control pill is taken daily. It contains hormones that stop a woman s body from releasing an egg each month.  Other hormones  Hormones that stop a woman s egg from being released each month can be delivered in other ways. These include injection, implant, patch, or vaginal ring.  Other choices  Here are additional birth control methods:    Male sterilization (vasectomy) is  surgery that ties off or cuts the tubes called the vas deferens in the testes. This is done so sperm cannot come out when the man ejaculates.    Female sterilization is surgery to block or cut the woman's fallopian tubes. It can be done by placing an instrument into the uterus (hysteroscopy) to insert small coils into the fallopian tubes (Essure). It can also be done through the belly (laparoscopy) to block the tubes or remove part or all of the tubes.    Withdrawal method is when the male doesn't ejaculate into the vagina, but rather withdraws his penis just before he ejaculates.    Fertility awareness method is when a woman keeps track of her fertile days. She only has intercourse at times when she is not likely to get pregnant.  Emergency contraception (EC)  Emergency contraception can help prevent pregnancy after unprotected sex. Hormone pills ( morning after pills ) are available over the counter to anyone. A second type of EC, a copper IUD, needs to be inserted by a trained healthcare provider. Either type of EC can be used up to 5 days after sex, but it should be taken as soon as possible. The sooner it is used after unprotected sex, the more likely it is to be effective. EC will not work if you re already pregnant.  Things to consider  Think about the following:    Choose a type of birth control that is easy for you to use.    Read the package and follow your health care provider's instructions to learn to use your birth control the right way.    Most forms of birth control do not protect you from sexually transmitted infections (STIs). To protect against STIs, always use a latex condom. If you are allergic to latex, a nonlatex condom may provide some protection.   Date Last Reviewed: 12/1/2016 2000-2017 [employer]. [address] [place] [address], [place] [address]. All rights reserved. This information is not intended as a substitute for professional medical care. Always follow your healthcare  professional's instructions.        Birth Control: The Pill    Birth control pills contain hormones that help prevent pregnancy. The pills are prescribed by your healthcare provider. There are many types of birth control pills available. If you have side effects from one type of pill, tell your healthcare provider. He or she may be able to prescribe a pill that works better for you.  Pregnancy rates  Talk to your healthcare provider about the effectiveness of this birth control method.  Using the pill    Take one pill daily. Take it at around the same time each day.    Follow your healthcare provider s guidelines on when to start your first pack of pills. You may need to use another form of birth control for a week or more after you start.    Know what to do if you forget to take a pill. (Consult your healthcare provider or check the package.) If you miss more than one pill, you may need to use a backup method of birth control for a week or more.  Pros    Low pregnancy rate    No interruption to sex    Easy to use    Can help make periods more regular    May lower your risk of ovarian cysts and certain cancers    May decrease menstrual cramps, menstrual flow, and acne  Cons    Does not protect against sexually transmitted infection (STIs)    Requires taking a pill on time each day    May not work as well when taken with certain other medicines (check with your pharmacist)    May cause side effects such as nausea, irregular bleeding, headaches, breast tenderness, fatigue, or mood changes (these often go away within 3 months)    May increase the risk of blood clots, heart attack, and stroke  The pill may not be for you  The pill may not be for you if:    You are a smoker and over age 35    You have high blood pressure or gallbladder, liver, cerebrovascular  or heart disease    You have diabetes, migraines, blood clot in the vein or artery, lupus, depression, certain lipid disorders, or take medicines that interfere  with the pill  In these cases, discuss the risks with your healthcare provider.  Date Last Reviewed: 3/1/2017    8995-8079 [employer]. [address] [place] [address], [place] [address]. All rights reserved. This information is not intended as a substitute for professional medical care. Always follow your healthcare professional's instructions.                Follow-ups after your visit        Who to contact     If you have questions or need follow up information about today's clinic visit or your schedule please contact [facility] directly at [phone].  Normal or non-critical lab and imaging results will be communicated to you by [employer], letter or phone within 4 business days after the clinic has received the results. If you do not hear from us within 7 days, please contact the clinic through [email or web address] or phone. If you have a critical or abnormal lab result, we will notify you by phone as soon as possible.  Submit refill requests through [employer] or call your pharmacy and they will forward the refill request to us. Please allow 3 business days for your refill to be completed.          Additional Information About Your Visit        MyChart Information     [employer] gives you secure access to your electronic health record. If you see a primary care provider, you can also send messages to your care team and make appointments. If you have questions, please call your primary care clinic.  If you do not have a primary care provider, please call [phone] and they will assist you.        Care EveryWhere ID     This is your Care EveryWhere ID. This could be used by other organizations to access your [facility] records  [id number]        Your Vitals Were     Pulse Temperature Respirations Last Period Pulse Oximetry BMI (Body Mass Index)    80 97.3  F (36.3  C) (Tympanic) 14 09/26/2018 99% 27.5 kg/m2       Blood Pressure from Last 3 Encounters:   09/27/18 138/82   06/06/18 110/62    05/16/18 124/76    Weight from Last 3 Encounters:   09/27/18 173 lb (78.5 kg)   06/06/18 171 lb (77.6 kg)   05/16/18 171 lb (77.6 kg)              Today, you had the following     No orders found for display         Today's Medication Changes          These changes are accurate as of 9/27/18  4:13 PM.  If you have any questions, ask your nurse or doctor.               Start taking these medicines.        Dose/Directions    norelgestromin-ethinyl estradiol 150-35 MCG/24HR patch   Commonly known as:  ORTHO EVRA   Used for:  Encounter for initial prescription of contraceptive pills   Started by:  Sandra Nunes APRN CNP        Remove old patch and apply new patch onto the skin once a week for 3 weeks (21 days). Do not wear patch week 4 (days 22-28), then repeat.   Quantity:  9 patch   Refills:  3         Stop taking these medicines if you haven't already. Please contact your care team if you have questions.     paragard intrauterine copper   Stopped by:  Sandra Nunes APRN CNP                Where to get your medicines      These medications were sent to [id number] [address]  [address] [place] [id number]    Hours:  Closed 10-16-08 business to [facility] Phone:  [phone]     norelgestromin-ethinyl estradiol 150-35 MCG/24HR patch                Primary Care Provider Office Phone # Fax #    Cyndee De Jesus MASHA Rizzo [phone] [facility] [address]        Equal Access to Services     CARLINE COBB : Hadii lidya bentleyo Soomaali, waaxda luqadaha, qaybta kaalmada adeegyada, kimi mendez. So [facility] [phone].    ATENCIÓN: Si habla español, tiene a roa disposición servicios gratuitos de asistencia lingüística. Llame al [phone].    We comply with applicable federal civil rights laws and [place] laws. We do not discriminate on the basis of race, color, national origin, age,  disability, sex, sexual orientation, or gender identity.            Thank you!     Thank you for choosing [facility]  for your care. Our goal is always to provide you with excellent care. Hearing back from our patients is one way we can continue to improve our services. Please take a few minutes to complete the written survey that you may receive in the mail after your visit with us. Thank you!             Your Updated Medication List - Protect others around you: Learn how to safely use, store and throw away your medicines at www.disposemymeds.org.          This list is accurate as of 9/27/18  4:13 PM.  Always use your most recent med list.                   Brand Name Dispense Instructions for use Diagnosis    albuterol 1.25 MG/3ML nebulizer solution    ACCUNEB    1 Box    Take 3 mLs by nebulization every 4 hours as needed for shortness of breath / dyspnea.    Asthma with acute exacerbation       Beclomethasone Dipropionate 80 MCG/ACT Aers Nasal Spray    QNASL    8.7 g    Spray 2 sprays into both nostrils daily    Chronic seasonal allergic rhinitis due to pollen, Chronic allergic rhinitis due to animal hair and dander, Allergic rhinitis due to dust mite       cyclobenzaprine 10 MG tablet    FLEXERIL    30 tablet    Take 1 tablet (10 mg) by mouth 3 times daily as needed for muscle spasms    Cervical strain, initial encounter       EPINEPHrine 0.3 MG/0.3ML injection 2-pack    AUVI-Q    0.6 mL    Inject 0.3 mLs (0.3 mg) into the muscle as needed for anaphylaxis    Chronic allergic rhinitis due to animal hair and dander, Chronic seasonal allergic rhinitis due to pollen, Mild intermittent asthma without complication, Latex sensitivity, Reaction to food, initial encounter       loratadine 10 MG tablet    CLARITIN    30 tablet    Take 1 tablet (10 mg) by mouth daily as needed for allergies    Urticaria       norelgestromin-ethinyl estradiol 150-35 MCG/24HR patch    ORTHO EVRA    9 patch    Remove old  patch and apply new patch onto the skin once a week for 3 weeks (21 days). Do not wear patch week 4 (days 22-28), then repeat.    Encounter for initial prescription of contraceptive pills

## 2018-09-27 NOTE — NURSING NOTE
"Chief Complaint   Patient presents with     Contraception       Initial There were no vitals taken for this visit. Estimated body mass index is 27.19 kg/(m^2) as calculated from the following:    Height as of 4/25/18: 5' 6.5\" (1.689 m).    Weight as of 6/6/18: 171 lb (77.6 kg).    Patient presents to the clinic using No DME    Health Maintenance that is potentially due pending provider review:  NONE    Possibly completing today per provider review.    Is there anyone who you would like to be able to receive your results? No  If yes have patient fill out ELIZA    "

## 2018-09-28 ASSESSMENT — ASTHMA QUESTIONNAIRES: ACT_TOTALSCORE: 24

## 2018-10-08 ENCOUNTER — OFFICE VISIT (OUTPATIENT)
Dept: FAMILY MEDICINE | Facility: CLINIC | Age: 28
End: 2018-10-08
Payer: COMMERCIAL

## 2018-10-08 VITALS
OXYGEN SATURATION: 99 % | BODY MASS INDEX: 27.15 KG/M2 | RESPIRATION RATE: 16 BRPM | HEART RATE: 94 BPM | SYSTOLIC BLOOD PRESSURE: 116 MMHG | WEIGHT: 173 LBS | HEIGHT: 67 IN | TEMPERATURE: 98.2 F | DIASTOLIC BLOOD PRESSURE: 66 MMHG

## 2018-10-08 DIAGNOSIS — R05.3 CHRONIC COUGH: ICD-10-CM

## 2018-10-08 DIAGNOSIS — J20.9 ACUTE BRONCHITIS, UNSPECIFIED ORGANISM: Primary | ICD-10-CM

## 2018-10-08 PROCEDURE — 99213 OFFICE O/P EST LOW 20 MIN: CPT | Performed by: FAMILY MEDICINE

## 2018-10-08 RX ORDER — BENZONATATE 200 MG/1
200 CAPSULE ORAL 3 TIMES DAILY PRN
Qty: 21 CAPSULE | Refills: 0 | Status: SHIPPED | OUTPATIENT
Start: 2018-10-08 | End: 2019-02-25

## 2018-10-08 RX ORDER — AZITHROMYCIN 250 MG/1
TABLET, FILM COATED ORAL
Qty: 6 TABLET | Refills: 0 | Status: SHIPPED | OUTPATIENT
Start: 2018-10-08 | End: 2019-02-25

## 2018-10-08 NOTE — PATIENT INSTRUCTIONS
Take antibiotic two pills tonight, then one pill a day for 4 more days    Stay hydrated at least 8 glasses of water a day    Take tessalon/benzonatate for cough. Okay to take with dayquil/nyquil.

## 2018-10-08 NOTE — PROGRESS NOTES
Subjective:   Sherley Quijano is a 27 year old female who presents for   Chief Complaint   Patient presents with     URI     3 weeks, lethargic, cough- productive, runny nose, headache, body aches, chest pressure- hard to take deep breath      Felt warm but No recorded fevers. Daughter has been sick at home with similar symptoms. No vomiting or diarrhea. Denies rash of the body. Has a bothersome cough that does keep her up at night time. Does have phlegm production. Medications she has tried : dayquil, nyquil. Congestion has gone away.   She has been sick for about 3 weeks now.     She denies COPD  Has mild intermittent asthma - has not been using her albuterol inhaler recently.     PMHX/PSHX/MEDS/ALLERGIES/SHX/FHX reviewed in Epic.    Patient Active Problem List    Diagnosis Date Noted     Chronic seasonal allergic rhinitis due to pollen 04/16/2018     Priority: Medium     Percutaneous skin puncture testing for aeroallergens performed on April 16, 2018 showed sensitivity to cat, dust mite (Dermatophagoides Farinae), tree pollen (Maple/Box Elder and oak), weed pollen (cocklebur, ragweed mix) and molds (Penicillium, Curvularia, Epicoccum, Aspergillus, Alternaria and Phoma).        Chronic allergic rhinitis due to animal hair and dander 04/16/2018     Priority: Medium     Allergic rhinitis due to dust mite 04/16/2018     Priority: Medium     Allergic conjunctivitis, bilateral 04/16/2018     Priority: Medium     IUGR (intrauterine growth restriction) affecting care of mother, third trimester, fetus 1 08/13/2016     Priority: Medium     Vaginal delivery 08/13/2016     Priority: Medium     SGA (small for gestational age) 08/10/2016     Priority: Medium     Sonogram 8/10/2016 the baby is quite small for gestational age. <10%; normal TJ; borderline elevated S/D ratio;  I would like her to have BPP TWICE a week and a repeat growth scan in 2 weeks          Supervision of normal first pregnancy 08/10/2016     Priority: Medium  "    Prenatal care, first pregnancy 01/14/2016     Priority: Medium     It's a GIRL!    32 week growth US 7/13: EFW 14%ile, TJ 14 cm. ; started weekly BPP at 34 weeks  Repeat growth US at 36 weeks       Migraine headache 03/04/2011     Priority: Medium     On imitrex       CARDIOVASCULAR SCREENING; LDL GOAL LESS THAN 130 12/27/2010     Priority: Medium     Adverse reaction to viral vaccines 04/14/2010     Priority: Medium     Had reaction to 2nd hpv swelling and ill. Did not complete series         Bipolar affective disorder (H) 12/31/2009     Priority: Medium     Attention deficit disorder 07/23/2007     Priority: Medium     Mild intermittent asthma 06/16/2005     Priority: Medium     Dysfunction of eustachian tube 06/16/2005     Priority: Medium       Current Outpatient Prescriptions   Medication     albuterol (ACCUNEB) 1.25 MG/3ML nebulizer solution     azithromycin (ZITHROMAX) 250 MG tablet     Beclomethasone Dipropionate (QNASL) 80 MCG/ACT AERS Nasal Spray     benzonatate (TESSALON) 200 MG capsule     cyclobenzaprine (FLEXERIL) 10 MG tablet     EPINEPHrine (AUVI-Q) 0.3 MG/0.3ML injection 2-pack     loratadine (CLARITIN) 10 MG tablet     norelgestromin-ethinyl estradiol (ORTHO EVRA) 150-35 MCG/24HR patch     No current facility-administered medications for this visit.        ROS:  As above per HPI    Objective:   /66  Pulse 94  Temp 98.2  F (36.8  C) (Tympanic)  Resp 16  Ht 5' 6.5\" (1.689 m)  Wt 173 lb (78.5 kg)  LMP 09/26/2018  SpO2 99%  BMI 27.5 kg/m2, Body mass index is 27.5 kg/(m^2).  Gen:  NAD, well-nourished, sitting in chair comfortably  HEENT: EOMI, sclera anicteric, Head normocephalic, ; nares patent; moist mucous membranes  Neck: trachea midline, no thyromegaly  CV:  Hemodynamically stable, RRR  Pulm:  no increased work of breathing , CTAB, no wheezes/rales/rhonchi   Extrem: no cyanosis, edema or clubbing  Skin: no obvious rashes or abnormalities  Psych: Euthymic, linear thoughts, normal " rate of speech    Assessment & Plan:   Sherley Quijano, 27 year old female who presents with:    Acute bronchitis, unspecified organism  3 weeks of chronic cough - will treat with azithromycin as this is effective for most respiratory infections and would also reduce transmission for pertussis if this happens to be the case  - benzonatate (TESSALON) 200 MG capsule  Dispense: 21 capsule; Refill: 0  - azithromycin (ZITHROMAX) 250 MG tablet  Dispense: 6 tablet; Refill: 0    Yehuda Sousa MD   [facility]     Options for treatment and/or follow-up care were reviewed with the patient. Sherley Quijano and/or legal guardian was engaged and actively involved in the decision making process. Patient/guardian verbalized understanding of the options discussed and was satisfied with the final plan.

## 2018-10-08 NOTE — MR AVS SNAPSHOT
After Visit Summary   10/8/2018    Sherley Quijano    MRN: [id number]           Patient Information     Date Of Birth          1990        Visit Information        Provider Department      10/8/2018 3:40 PM Yehuda Sousa MD [facility]        Today's Diagnoses     Acute bronchitis, unspecified organism    -  1      Care Instructions    Take antibiotic two pills tonight, then one pill a day for 4 more days    Stay hydrated at least 8 glasses of water a day    Take tessalon/benzonatate for cough. Okay to take with dayquil/nyquil.           Follow-ups after your visit        Who to contact     If you have questions or need follow up information about today's clinic visit or your schedule please contact [facility] directly at [phone].  Normal or non-critical lab and imaging results will be communicated to you by MyChart, letter or phone within 4 business days after the clinic has received the results. If you do not hear from us within 7 days, please contact the clinic through MyChart or phone. If you have a critical or abnormal lab result, we will notify you by phone as soon as possible.  Submit refill requests through [employer] or call your pharmacy and they will forward the refill request to us. Please allow 3 business days for your refill to be completed.          Additional Information About Your Visit        MyChart Information     [employer] gives you secure access to your electronic health record. If you see a primary care provider, you can also send messages to your care team and make appointments. If you have questions, please call your primary care clinic.  If you do not have a primary care provider, please call [phone] and they will assist you.        Care EveryWhere ID     This is your Care EveryWhere ID. This could be used by other organizations to access your [place] medical records  [id number]        Your Vitals Were     Pulse  "Temperature Respirations Height Last Period Pulse Oximetry    94 98.2  F (36.8  C) (Tympanic) 16 5' 6.5\" (1.689 m) 09/26/2018 99%    BMI (Body Mass Index)                   27.5 kg/m2            Blood Pressure from Last 3 Encounters:   10/08/18 116/66   09/27/18 138/82   06/06/18 110/62    Weight from Last 3 Encounters:   10/08/18 173 lb (78.5 kg)   09/27/18 173 lb (78.5 kg)   06/06/18 171 lb (77.6 kg)              Today, you had the following     No orders found for display         Today's Medication Changes          These changes are accurate as of 10/8/18  4:02 PM.  If you have any questions, ask your nurse or doctor.               Start taking these medicines.        Dose/Directions    azithromycin 250 MG tablet   Commonly known as:  ZITHROMAX   Used for:  Acute bronchitis, unspecified organism   Started by:  Yehuda Sousa MD        Two tablets first day, then one tablet daily for four days.   Quantity:  6 tablet   Refills:  0       benzonatate 200 MG capsule   Commonly known as:  TESSALON   Used for:  Acute bronchitis, unspecified organism   Started by:  Yehuda Sousa MD        Dose:  200 mg   Take 1 capsule (200 mg) by mouth 3 times daily as needed for cough   Quantity:  21 capsule   Refills:  0            Where to get your medicines      These medications were sent to [id number] [place] [id number]    Hours:  Closed 10-16-08 business to [facility] Phone:  [phone]     azithromycin 250 MG tablet    benzonatate 200 MG capsule                Primary Care Provider Office Phone # Fax #    Cyndee MASHA Valerio [phone] [address] [facility] [address]        Equal Access to Services     CARLINE COBB AH: Fidencio Victor, wabalwinderda luqadaha, qaybta kaalmada sean, kimi mendez. So [facility] [phone].    ATENCIÓN: Si osvaldo espmanish bauer a roa " disposición servicios gratuitos de asistencia lingüística. Britta kiser [phone].    We comply with applicable federal civil rights laws and [place] laws. We do not discriminate on the basis of race, color, national origin, age, disability, sex, sexual orientation, or gender identity.            Thank you!     Thank you for choosing [facility]  for your care. Our goal is always to provide you with excellent care. Hearing back from our patients is one way we can continue to improve our services. Please take a few minutes to complete the written survey that you may receive in the mail after your visit with us. Thank you!             Your Updated Medication List - Protect others around you: Learn how to safely use, store and throw away your medicines at www.disposemymeds.org.          This list is accurate as of 10/8/18  4:02 PM.  Always use your most recent med list.                   Brand Name Dispense Instructions for use Diagnosis    albuterol 1.25 MG/3ML nebulizer solution    ACCUNEB    1 Box    Take 3 mLs by nebulization every 4 hours as needed for shortness of breath / dyspnea.    Asthma with acute exacerbation       azithromycin 250 MG tablet    ZITHROMAX    6 tablet    Two tablets first day, then one tablet daily for four days.    Acute bronchitis, unspecified organism       Beclomethasone Dipropionate 80 MCG/ACT Aers Nasal Spray    QNASL    8.7 g    Spray 2 sprays into both nostrils daily    Chronic seasonal allergic rhinitis due to pollen, Chronic allergic rhinitis due to animal hair and dander, Allergic rhinitis due to dust mite       benzonatate 200 MG capsule    TESSALON    21 capsule    Take 1 capsule (200 mg) by mouth 3 times daily as needed for cough    Acute bronchitis, unspecified organism       cyclobenzaprine 10 MG tablet    FLEXERIL    30 tablet    Take 1 tablet (10 mg) by mouth 3 times daily as needed for muscle spasms    Cervical strain, initial encounter       EPINEPHrine  0.3 MG/0.3ML injection 2-pack    AUVI-Q    0.6 mL    Inject 0.3 mLs (0.3 mg) into the muscle as needed for anaphylaxis    Chronic allergic rhinitis due to animal hair and dander, Chronic seasonal allergic rhinitis due to pollen, Mild intermittent asthma without complication, Latex sensitivity, Reaction to food, initial encounter       loratadine 10 MG tablet    CLARITIN    30 tablet    Take 1 tablet (10 mg) by mouth daily as needed for allergies    Urticaria       norelgestromin-ethinyl estradiol 150-35 MCG/24HR patch    ORTHO EVRA    9 patch    Remove old patch and apply new patch onto the skin once a week for 3 weeks (21 days). Do not wear patch week 4 (days 22-28), then repeat.    Encounter for initial prescription of contraceptive pills

## 2018-11-13 ENCOUNTER — OFFICE VISIT (OUTPATIENT)
Dept: FAMILY MEDICINE | Facility: CLINIC | Age: 28
End: 2018-11-13
Payer: COMMERCIAL

## 2018-11-13 VITALS
SYSTOLIC BLOOD PRESSURE: 128 MMHG | TEMPERATURE: 97.5 F | BODY MASS INDEX: 27.15 KG/M2 | HEART RATE: 81 BPM | WEIGHT: 173 LBS | OXYGEN SATURATION: 99 % | HEIGHT: 67 IN | DIASTOLIC BLOOD PRESSURE: 76 MMHG

## 2018-11-13 DIAGNOSIS — B37.9 ANTIBIOTIC-INDUCED YEAST INFECTION: ICD-10-CM

## 2018-11-13 DIAGNOSIS — T36.95XA ANTIBIOTIC-INDUCED YEAST INFECTION: ICD-10-CM

## 2018-11-13 DIAGNOSIS — J01.90 ACUTE SINUSITIS WITH SYMPTOMS > 10 DAYS: Primary | ICD-10-CM

## 2018-11-13 PROCEDURE — 99213 OFFICE O/P EST LOW 20 MIN: CPT | Performed by: PHYSICIAN ASSISTANT

## 2018-11-13 RX ORDER — DOXYCYCLINE 100 MG/1
100 CAPSULE ORAL 2 TIMES DAILY
Qty: 20 CAPSULE | Refills: 0 | Status: SHIPPED | OUTPATIENT
Start: 2018-11-13 | End: 2019-02-25

## 2018-11-13 RX ORDER — FLUCONAZOLE 150 MG/1
TABLET ORAL
Qty: 2 TABLET | Refills: 0 | Status: SHIPPED | OUTPATIENT
Start: 2018-11-13 | End: 2019-02-25

## 2018-11-13 ASSESSMENT — ENCOUNTER SYMPTOMS
DIARRHEA: 0
ABDOMINAL PAIN: 0
EYE DISCHARGE: 0
CHILLS: 0
SORE THROAT: 1
HEADACHES: 1
BLURRED VISION: 0
VOMITING: 0
SINUS PAIN: 1
SHORTNESS OF BREATH: 0
FEVER: 0
NAUSEA: 0
COUGH: 1
EYE REDNESS: 0
PALPITATIONS: 0
WHEEZING: 0
MYALGIAS: 0

## 2018-11-13 NOTE — MR AVS SNAPSHOT
After Visit Summary   11/13/2018    Sherley Quijano    MRN: [id number]           Patient Information     Date Of Birth          1990        Visit Information        Provider Department      11/13/2018 12:00 PM Nela Gibbs PA-C [facility]        Today's Diagnoses     Acute sinusitis with symptoms > 10 days    -  1    Antibiotic-induced yeast infection           Follow-ups after your visit        Follow-up notes from your care team     Return if symptoms worsen or fail to improve.      Who to contact     If you have questions or need follow up information about today's clinic visit or your schedule please contact [facility] directly at [phone].  Normal or non-critical lab and imaging results will be communicated to you by MyChart, letter or phone within 4 business days after the clinic has received the results. If you do not hear from us within 7 days, please contact the clinic through [employer] or phone. If you have a critical or abnormal lab result, we will notify you by phone as soon as possible.  Submit refill requests through [employer] or call your pharmacy and they will forward the refill request to us. Please allow 3 business days for your refill to be completed.          Additional Information About Your Visit        MyChart Information     [employer] gives you secure access to your electronic health record. If you see a primary care provider, you can also send messages to your care team and make appointments. If you have questions, please call your primary care clinic.  If you do not have a primary care provider, please call [phone] and they will assist you.        Care EveryWhere ID     This is your Care EveryWhere ID. This could be used by other organizations to access your [place] medical records  [id number]        Your Vitals Were     Pulse Temperature Height Last Period Pulse Oximetry BMI (Body Mass Index)    81 97.5  F (36.4  C)  "(Tympanic) 5' 6.5\" (1.689 m) 10/30/2018 99% 27.5 kg/m2       Blood Pressure from Last 3 Encounters:   11/13/18 128/76   10/08/18 116/66   09/27/18 138/82    Weight from Last 3 Encounters:   11/13/18 173 lb (78.5 kg)   10/08/18 173 lb (78.5 kg)   09/27/18 173 lb (78.5 kg)              Today, you had the following     No orders found for display         Today's Medication Changes          These changes are accurate as of 11/13/18  2:11 PM.  If you have any questions, ask your nurse or doctor.               Start taking these medicines.        Dose/Directions    doxycycline monohydrate 100 MG capsule   Used for:  Acute sinusitis with symptoms > 10 days   Started by:  Nela Gibbs PA-C        Dose:  100 mg   Take 1 capsule (100 mg) by mouth 2 times daily for 10 days   Quantity:  20 capsule   Refills:  0       fluconazole 150 MG tablet   Commonly known as:  DIFLUCAN   Used for:  Antibiotic-induced yeast infection   Started by:  Nela Gibbs PA-C        Take 1 tablet by mouth x 1; May repeat in 1 week if still symptomatic   Quantity:  2 tablet   Refills:  0            Where to get your medicines      These medications were sent to [id number] [place] [id number]    Hours:  Closed 10-16-08 business to [facility] Phone:  [phone]     doxycycline monohydrate 100 MG capsule    fluconazole 150 MG tablet                Primary Care Provider Office Phone # Fax #    Cyndee Rizzo, MASHA [phone] [address] [facility] [address]        Equal Access to Services     VIRI COBB AH: Fidencio Victor, moni davis, rolanda estrada, kimi mendez. So [facility] [phone].    ATENCIÓN: Si habla español, tiene a roa disposición servicios gratuitos de asistencia lingüística. Llame al [phone].    We comply with applicable federal civil rights laws and [place] " laws. We do not discriminate on the basis of race, color, national origin, age, disability, sex, sexual orientation, or gender identity.            Thank you!     Thank you for choosing [facility]  for your care. Our goal is always to provide you with excellent care. Hearing back from our patients is one way we can continue to improve our services. Please take a few minutes to complete the written survey that you may receive in the mail after your visit with us. Thank you!             Your Updated Medication List - Protect others around you: Learn how to safely use, store and throw away your medicines at www.disposemymeds.org.          This list is accurate as of 11/13/18  2:11 PM.  Always use your most recent med list.                   Brand Name Dispense Instructions for use Diagnosis    albuterol 1.25 MG/3ML nebulizer solution    ACCUNEB    1 Box    Take 3 mLs by nebulization every 4 hours as needed for shortness of breath / dyspnea.    Asthma with acute exacerbation       azithromycin 250 MG tablet    ZITHROMAX    6 tablet    Two tablets first day, then one tablet daily for four days.    Acute bronchitis, unspecified organism       Beclomethasone Dipropionate 80 MCG/ACT Aers Nasal Spray    QNASL    8.7 g    Spray 2 sprays into both nostrils daily    Chronic seasonal allergic rhinitis due to pollen, Chronic allergic rhinitis due to animal hair and dander, Allergic rhinitis due to dust mite       benzonatate 200 MG capsule    TESSALON    21 capsule    Take 1 capsule (200 mg) by mouth 3 times daily as needed for cough    Acute bronchitis, unspecified organism       cyclobenzaprine 10 MG tablet    FLEXERIL    30 tablet    Take 1 tablet (10 mg) by mouth 3 times daily as needed for muscle spasms    Cervical strain, initial encounter       doxycycline monohydrate 100 MG capsule     20 capsule    Take 1 capsule (100 mg) by mouth 2 times daily for 10 days    Acute sinusitis with symptoms > 10 days        EPINEPHrine 0.3 MG/0.3ML injection 2-pack    AUVI-Q    0.6 mL    Inject 0.3 mLs (0.3 mg) into the muscle as needed for anaphylaxis    Chronic allergic rhinitis due to animal hair and dander, Chronic seasonal allergic rhinitis due to pollen, Mild intermittent asthma without complication, Latex sensitivity, Reaction to food, initial encounter       fluconazole 150 MG tablet    DIFLUCAN    2 tablet    Take 1 tablet by mouth x 1; May repeat in 1 week if still symptomatic    Antibiotic-induced yeast infection       loratadine 10 MG tablet    CLARITIN    30 tablet    Take 1 tablet (10 mg) by mouth daily as needed for allergies    Urticaria       norelgestromin-ethinyl estradiol 150-35 MCG/24HR patch    ORTHO EVRA    9 patch    Remove old patch and apply new patch onto the skin once a week for 3 weeks (21 days). Do not wear patch week 4 (days 22-28), then repeat.    Encounter for initial prescription of contraceptive pills

## 2018-11-13 NOTE — PROGRESS NOTES
SUBJECTIVE:   Sherley Quijano is a 28 year old female who presents to clinic today for the following health issues:      Cough       Duration: 8 weeks     Description (location/character/radiation): Cough     Intensity:  moderate, severe    Accompanying signs and symptoms: cough, wheezing, bilateral ear pain, sinus pain/pressure, teeth hurt, headache, runny nose, congestion, cough is a combination of productive and non productive, sore throat, chills off and on, blood shot eyes, fatigue     History (similar episodes/previous evaluation): 10/8/18 diagnosed with bronchitis     Precipitating or alleviating factors: None    Therapies tried and outcome: zpak- finished and tessalon pearles, nyquil, dayquil, sima seltzer, airbourne, emergenC, ibuprofen, tylenol          Problem list and histories reviewed & adjusted, as indicated.  Additional history: as documented    Patient Active Problem List   Diagnosis     Mild intermittent asthma     Dysfunction of eustachian tube     Attention deficit disorder     Bipolar affective disorder (H)     Adverse reaction to viral vaccines     CARDIOVASCULAR SCREENING; LDL GOAL LESS THAN 130     Migraine headache     Prenatal care, first pregnancy     SGA (small for gestational age)     Supervision of normal first pregnancy     IUGR (intrauterine growth restriction) affecting care of mother, third trimester, fetus 1     Vaginal delivery     Chronic seasonal allergic rhinitis due to pollen     Chronic allergic rhinitis due to animal hair and dander     Allergic rhinitis due to dust mite     Allergic conjunctivitis, bilateral     Past Surgical History:   Procedure Laterality Date     D & C  2006    TAB     MOUTH SURGERY  2008    wisdom teeth       Social History   Substance Use Topics     Smoking status: Former Smoker     Packs/day: 0.25     Types: Cigarettes     Quit date: 10/2017     Smokeless tobacco: Never Used      Comment: Needle using ecgis since october 2018     Alcohol use 0.0  oz/week     0 Standard drinks or equivalent per week      Comment: rare     Family History   Problem Relation Age of Onset     C.A.D. Maternal Grandfather      MI     HEART DISEASE Maternal Grandfather      Breast Cancer Maternal Grandmother      Depression Maternal Grandmother      Thyroid Disease Maternal Grandmother      Breast Cancer Paternal Grandmother      Alcohol/Drug Father      Allergies Father      Allergies Sister      Neurologic Disorder Sister      Narcolepsy     Substance Abuse Sister      recovered drugs     Substance Abuse Mother      recovered alcohol     Depression Mother      Thyroid Disease Mother      Other - See Comments Other      Heart Attack         Current Outpatient Prescriptions   Medication Sig Dispense Refill     albuterol (ACCUNEB) 1.25 MG/3ML nebulizer solution Take 3 mLs by nebulization every 4 hours as needed for shortness of breath / dyspnea. 1 Box 3     Beclomethasone Dipropionate (QNASL) 80 MCG/ACT AERS Nasal Spray Spray 2 sprays into both nostrils daily 8.7 g 1     cyclobenzaprine (FLEXERIL) 10 MG tablet Take 1 tablet (10 mg) by mouth 3 times daily as needed for muscle spasms 30 tablet 0     doxycycline monohydrate 100 MG capsule Take 1 capsule (100 mg) by mouth 2 times daily for 10 days 20 capsule 0     EPINEPHrine (AUVI-Q) 0.3 MG/0.3ML injection 2-pack Inject 0.3 mLs (0.3 mg) into the muscle as needed for anaphylaxis 0.6 mL 1     fluconazole (DIFLUCAN) 150 MG tablet Take 1 tablet by mouth x 1; May repeat in 1 week if still symptomatic 2 tablet 0     loratadine (CLARITIN) 10 MG tablet Take 1 tablet (10 mg) by mouth daily as needed for allergies 30 tablet 3     norelgestromin-ethinyl estradiol (ORTHO EVRA) 150-35 MCG/24HR patch Remove old patch and apply new patch onto the skin once a week for 3 weeks (21 days). Do not wear patch week 4 (days 22-28), then repeat. 9 patch 3     azithromycin (ZITHROMAX) 250 MG tablet Two tablets first day, then one tablet daily for four days.  "(Patient not taking: Reported on 11/13/2018) 6 tablet 0     benzonatate (TESSALON) 200 MG capsule Take 1 capsule (200 mg) by mouth 3 times daily as needed for cough (Patient not taking: Reported on 11/13/2018) 21 capsule 0     Allergies   Allergen Reactions     Kiwi Anaphylaxis     Throat swelling, difficulty breathing     Lanolin Hives     Latex Hives     Penicillins Rash     Labs reviewed in EPIC    Reviewed and updated as needed this visit by clinical staff  Tobacco  Allergies  Meds  Problems  Med Hx  Surg Hx  Fam Hx  Soc Hx        Reviewed and updated as needed this visit by Provider  Allergies  Meds  Problems         ROS:  Review of Systems   Constitutional: Positive for malaise/fatigue. Negative for chills and fever.   HENT: Positive for congestion, ear pain, sinus pain and sore throat.    Eyes: Negative for blurred vision, discharge and redness.   Respiratory: Positive for cough. Negative for shortness of breath and wheezing.    Cardiovascular: Negative for chest pain and palpitations.   Gastrointestinal: Negative for abdominal pain, diarrhea, nausea and vomiting.   Musculoskeletal: Negative for joint pain and myalgias.   Skin: Negative for rash.   Neurological: Positive for headaches.         OBJECTIVE:     /76  Pulse 81  Temp 97.5  F (36.4  C) (Tympanic)  Ht 5' 6.5\" (1.689 m)  Wt 173 lb (78.5 kg)  LMP 10/30/2018  SpO2 99%  BMI 27.5 kg/m2  Body mass index is 27.5 kg/(m^2).    Physical Exam   Constitutional: She is well-developed, well-nourished, and in no distress.   HENT:   Head: Normocephalic.   Right Ear: Tympanic membrane and ear canal normal.   Left Ear: Tympanic membrane and ear canal normal.   Nose: Mucosal edema and rhinorrhea present.   Mouth/Throat: Posterior oropharyngeal erythema present. No oropharyngeal exudate.   Eyes: Conjunctivae are normal. Pupils are equal, round, and reactive to light.   Cardiovascular: Normal rate, regular rhythm and normal heart sounds.  "   Pulmonary/Chest: Effort normal and breath sounds normal.   Skin: No rash noted.   Psychiatric:   Alert and cooperative       Diagnostic Test Results:  none     ASSESSMENT/PLAN:     1. Acute sinusitis with symptoms > 10 days  Will treat with doxycyline x 10 days. Get plenty of rest and push fluids. Continue with supportive care. Follow up as needed.     - doxycycline monohydrate 100 MG capsule; Take 1 capsule (100 mg) by mouth 2 times daily for 10 days  Dispense: 20 capsule; Refill: 0    2. Antibiotic-induced yeast infection  Can take fluconazole if needed.   - fluconazole (DIFLUCAN) 150 MG tablet; Take 1 tablet by mouth x 1; May repeat in 1 week if still symptomatic  Dispense: 2 tablet; Refill: 0       Nela Gibbs PA-C  [facility]

## 2018-11-13 NOTE — NURSING NOTE
"Chief Complaint   Patient presents with     Cough       Initial /76  Pulse 81  Temp 97.5  F (36.4  C) (Tympanic)  Ht 5' 6.5\" (1.689 m)  Wt 173 lb (78.5 kg)  LMP 10/30/2018  SpO2 99%  BMI 27.5 kg/m2 Estimated body mass index is 27.5 kg/(m^2) as calculated from the following:    Height as of this encounter: 5' 6.5\" (1.689 m).    Weight as of this encounter: 173 lb (78.5 kg).    Patient presents to the clinic using No DME    Health Maintenance that is potentially due pending provider review:  NONE    n/a    Is there anyone who you would like to be able to receive your results? No  If yes have patient fill out ELIZA      "

## 2019-02-25 ENCOUNTER — APPOINTMENT (OUTPATIENT)
Dept: CT IMAGING | Facility: CLINIC | Age: 29
End: 2019-02-25
Attending: EMERGENCY MEDICINE
Payer: COMMERCIAL

## 2019-02-25 ENCOUNTER — HOSPITAL ENCOUNTER (EMERGENCY)
Facility: CLINIC | Age: 29
Discharge: HOME | End: 2019-02-26
Attending: EMERGENCY MEDICINE | Admitting: EMERGENCY MEDICINE
Payer: COMMERCIAL

## 2019-02-25 VITALS
HEART RATE: 88 BPM | OXYGEN SATURATION: 96 % | SYSTOLIC BLOOD PRESSURE: 146 MMHG | DIASTOLIC BLOOD PRESSURE: 89 MMHG | WEIGHT: 173 LBS | BODY MASS INDEX: 27.15 KG/M2 | RESPIRATION RATE: 16 BRPM | TEMPERATURE: 99 F | HEIGHT: 67 IN

## 2019-02-25 DIAGNOSIS — K52.9 COLITIS: ICD-10-CM

## 2019-02-25 DIAGNOSIS — E86.0 DEHYDRATION: ICD-10-CM

## 2019-02-25 DIAGNOSIS — R19.7 NAUSEA VOMITING AND DIARRHEA: ICD-10-CM

## 2019-02-25 DIAGNOSIS — R11.2 NAUSEA VOMITING AND DIARRHEA: ICD-10-CM

## 2019-02-25 LAB
ALBUMIN SERPL-MCNC: 4.1 G/DL (ref 3.4–5)
ALBUMIN UR-MCNC: NEGATIVE MG/DL
ALP SERPL-CCNC: 50 U/L (ref 40–150)
ALT SERPL W P-5'-P-CCNC: 17 U/L (ref 0–50)
ANION GAP SERPL CALCULATED.3IONS-SCNC: 8 MMOL/L (ref 3–14)
APPEARANCE UR: ABNORMAL
AST SERPL W P-5'-P-CCNC: 9 U/L (ref 0–45)
BACTERIA #/AREA URNS HPF: ABNORMAL /HPF
BASOPHILS # BLD AUTO: 0.1 10E9/L (ref 0–0.2)
BASOPHILS NFR BLD AUTO: 0.3 %
BILIRUB SERPL-MCNC: 0.4 MG/DL (ref 0.2–1.3)
BILIRUB UR QL STRIP: NEGATIVE
BUN SERPL-MCNC: 20 MG/DL (ref 7–30)
CALCIUM SERPL-MCNC: 9.2 MG/DL (ref 8.5–10.1)
CAOX CRY #/AREA URNS HPF: ABNORMAL /HPF
CHLORIDE SERPL-SCNC: 105 MMOL/L (ref 94–109)
CO2 SERPL-SCNC: 24 MMOL/L (ref 20–32)
COLOR UR AUTO: YELLOW
CREAT SERPL-MCNC: 0.85 MG/DL (ref 0.52–1.04)
DIFFERENTIAL METHOD BLD: ABNORMAL
EOSINOPHIL # BLD AUTO: 0 10E9/L (ref 0–0.7)
EOSINOPHIL NFR BLD AUTO: 0.2 %
ERYTHROCYTE [DISTWIDTH] IN BLOOD BY AUTOMATED COUNT: 11.6 % (ref 10–15)
GFR SERPL CREATININE-BSD FRML MDRD: >90 ML/MIN/{1.73_M2}
GLUCOSE SERPL-MCNC: 115 MG/DL (ref 70–99)
GLUCOSE UR STRIP-MCNC: NEGATIVE MG/DL
HCG UR QL: NEGATIVE
HCT VFR BLD AUTO: 43.4 % (ref 35–47)
HGB BLD-MCNC: 15 G/DL (ref 11.7–15.7)
HGB UR QL STRIP: NEGATIVE
IMM GRANULOCYTES # BLD: 0.1 10E9/L (ref 0–0.4)
IMM GRANULOCYTES NFR BLD: 0.3 %
KETONES UR STRIP-MCNC: 20 MG/DL
LEUKOCYTE ESTERASE UR QL STRIP: ABNORMAL
LIPASE SERPL-CCNC: 76 U/L (ref 73–393)
LYMPHOCYTES # BLD AUTO: 1.3 10E9/L (ref 0.8–5.3)
LYMPHOCYTES NFR BLD AUTO: 7.5 %
MCH RBC QN AUTO: 31.4 PG (ref 26.5–33)
MCHC RBC AUTO-ENTMCNC: 34.6 G/DL (ref 31.5–36.5)
MCV RBC AUTO: 91 FL (ref 78–100)
MONOCYTES # BLD AUTO: 0.6 10E9/L (ref 0–1.3)
MONOCYTES NFR BLD AUTO: 3.6 %
MUCOUS THREADS #/AREA URNS LPF: PRESENT /LPF
NEUTROPHILS # BLD AUTO: 14.7 10E9/L (ref 1.6–8.3)
NEUTROPHILS NFR BLD AUTO: 88.1 %
NITRATE UR QL: NEGATIVE
NRBC # BLD AUTO: 0 10*3/UL
NRBC BLD AUTO-RTO: 0 /100
PH UR STRIP: 5 PH (ref 5–7)
PLATELET # BLD AUTO: 320 10E9/L (ref 150–450)
POTASSIUM SERPL-SCNC: 3.7 MMOL/L (ref 3.4–5.3)
PROT SERPL-MCNC: 7.4 G/DL (ref 6.8–8.8)
RBC # BLD AUTO: 4.78 10E12/L (ref 3.8–5.2)
RBC #/AREA URNS AUTO: 9 /HPF (ref 0–2)
SODIUM SERPL-SCNC: 137 MMOL/L (ref 133–144)
SOURCE: ABNORMAL
SP GR UR STRIP: 1.03 (ref 1–1.03)
SQUAMOUS #/AREA URNS AUTO: 11 /HPF (ref 0–1)
UROBILINOGEN UR STRIP-MCNC: 0 MG/DL (ref 0–2)
WBC # BLD AUTO: 16.7 10E9/L (ref 4–11)
WBC #/AREA URNS AUTO: 6 /HPF (ref 0–5)

## 2019-02-25 PROCEDURE — 96375 TX/PRO/DX INJ NEW DRUG ADDON: CPT | Performed by: EMERGENCY MEDICINE

## 2019-02-25 PROCEDURE — 99285 EMERGENCY DEPT VISIT HI MDM: CPT | Mod: Z6 | Performed by: EMERGENCY MEDICINE

## 2019-02-25 PROCEDURE — 96376 TX/PRO/DX INJ SAME DRUG ADON: CPT | Performed by: EMERGENCY MEDICINE

## 2019-02-25 PROCEDURE — 80053 COMPREHEN METABOLIC PANEL: CPT | Performed by: EMERGENCY MEDICINE

## 2019-02-25 PROCEDURE — 25000132 ZZH RX MED GY IP 250 OP 250 PS 637: Performed by: EMERGENCY MEDICINE

## 2019-02-25 PROCEDURE — 85025 COMPLETE CBC W/AUTO DIFF WBC: CPT | Performed by: EMERGENCY MEDICINE

## 2019-02-25 PROCEDURE — 25000125 ZZHC RX 250: Performed by: EMERGENCY MEDICINE

## 2019-02-25 PROCEDURE — 99285 EMERGENCY DEPT VISIT HI MDM: CPT | Mod: 25 | Performed by: EMERGENCY MEDICINE

## 2019-02-25 PROCEDURE — 25000128 H RX IP 250 OP 636: Performed by: EMERGENCY MEDICINE

## 2019-02-25 PROCEDURE — 25800030 ZZH RX IP 258 OP 636: Performed by: EMERGENCY MEDICINE

## 2019-02-25 PROCEDURE — 81001 URINALYSIS AUTO W/SCOPE: CPT | Performed by: EMERGENCY MEDICINE

## 2019-02-25 PROCEDURE — 96361 HYDRATE IV INFUSION ADD-ON: CPT | Performed by: EMERGENCY MEDICINE

## 2019-02-25 PROCEDURE — 74177 CT ABD & PELVIS W/CONTRAST: CPT

## 2019-02-25 PROCEDURE — 83690 ASSAY OF LIPASE: CPT | Performed by: EMERGENCY MEDICINE

## 2019-02-25 PROCEDURE — 96374 THER/PROPH/DIAG INJ IV PUSH: CPT | Mod: 59 | Performed by: EMERGENCY MEDICINE

## 2019-02-25 PROCEDURE — 81025 URINE PREGNANCY TEST: CPT | Performed by: EMERGENCY MEDICINE

## 2019-02-25 RX ORDER — HYDROMORPHONE HYDROCHLORIDE 1 MG/ML
0.5 INJECTION, SOLUTION INTRAMUSCULAR; INTRAVENOUS; SUBCUTANEOUS ONCE
Status: COMPLETED | OUTPATIENT
Start: 2019-02-25 | End: 2019-02-25

## 2019-02-25 RX ORDER — SODIUM CHLORIDE 9 MG/ML
1000 INJECTION, SOLUTION INTRAVENOUS CONTINUOUS
Status: DISCONTINUED | OUTPATIENT
Start: 2019-02-25 | End: 2019-02-26 | Stop reason: HOSPADM

## 2019-02-25 RX ORDER — ONDANSETRON 4 MG/1
4-8 TABLET, ORALLY DISINTEGRATING ORAL EVERY 8 HOURS PRN
Qty: 10 TABLET | Refills: 2 | Status: SHIPPED | OUTPATIENT
Start: 2019-02-25 | End: 2020-01-13

## 2019-02-25 RX ORDER — ONDANSETRON 2 MG/ML
4 INJECTION INTRAMUSCULAR; INTRAVENOUS EVERY 30 MIN PRN
Status: DISCONTINUED | OUTPATIENT
Start: 2019-02-25 | End: 2019-02-26 | Stop reason: HOSPADM

## 2019-02-25 RX ORDER — LOPERAMIDE HCL 2 MG
2 CAPSULE ORAL ONCE
Status: COMPLETED | OUTPATIENT
Start: 2019-02-25 | End: 2019-02-25

## 2019-02-25 RX ORDER — IOPAMIDOL 755 MG/ML
84 INJECTION, SOLUTION INTRAVASCULAR ONCE
Status: COMPLETED | OUTPATIENT
Start: 2019-02-25 | End: 2019-02-25

## 2019-02-25 RX ORDER — OXYCODONE AND ACETAMINOPHEN 5; 325 MG/1; MG/1
2 TABLET ORAL ONCE
Status: COMPLETED | OUTPATIENT
Start: 2019-02-25 | End: 2019-02-25

## 2019-02-25 RX ORDER — KETOROLAC TROMETHAMINE 30 MG/ML
30 INJECTION, SOLUTION INTRAMUSCULAR; INTRAVENOUS ONCE
Status: COMPLETED | OUTPATIENT
Start: 2019-02-25 | End: 2019-02-25

## 2019-02-25 RX ORDER — OXYCODONE AND ACETAMINOPHEN 5; 325 MG/1; MG/1
1-2 TABLET ORAL EVERY 4 HOURS PRN
Qty: 12 TABLET | Refills: 0 | Status: SHIPPED | OUTPATIENT
Start: 2019-02-25 | End: 2019-02-27

## 2019-02-25 RX ADMIN — IOPAMIDOL 84 ML: 755 INJECTION, SOLUTION INTRAVENOUS at 22:33

## 2019-02-25 RX ADMIN — KETOROLAC TROMETHAMINE 30 MG: 30 INJECTION, SOLUTION INTRAMUSCULAR at 22:23

## 2019-02-25 RX ADMIN — Medication 0.5 MG: at 21:14

## 2019-02-25 RX ADMIN — SODIUM CHLORIDE 1000 ML: 9 INJECTION, SOLUTION INTRAVENOUS at 21:17

## 2019-02-25 RX ADMIN — SODIUM CHLORIDE 1000 ML: 9 INJECTION, SOLUTION INTRAVENOUS at 20:25

## 2019-02-25 RX ADMIN — LOPERAMIDE HYDROCHLORIDE 2 MG: 2 CAPSULE ORAL at 23:21

## 2019-02-25 RX ADMIN — ONDANSETRON 4 MG: 2 INJECTION INTRAMUSCULAR; INTRAVENOUS at 20:31

## 2019-02-25 RX ADMIN — ONDANSETRON 4 MG: 2 INJECTION INTRAMUSCULAR; INTRAVENOUS at 21:17

## 2019-02-25 RX ADMIN — SODIUM CHLORIDE 61 ML: 9 INJECTION, SOLUTION INTRAVENOUS at 22:33

## 2019-02-25 RX ADMIN — OXYCODONE HYDROCHLORIDE AND ACETAMINOPHEN 2 TABLET: 5; 325 TABLET ORAL at 23:21

## 2019-02-25 RX ADMIN — SODIUM CHLORIDE, POTASSIUM CHLORIDE, SODIUM LACTATE AND CALCIUM CHLORIDE 1000 ML: 600; 310; 30; 20 INJECTION, SOLUTION INTRAVENOUS at 22:23

## 2019-02-25 ASSESSMENT — MIFFLIN-ST. JEOR: SCORE: 1539.41

## 2019-02-25 NOTE — LETTER
February 25, 2019      To Whom It May Concern:      Sherley Quijano was seen in our Emergency Department today, Monday 02/25/19.  I expect her condition to improve over the next several days.  Please excuse her from work as needed tomorrow and over the next several days, due to illness.    Sincerely,        KB Johnson MD

## 2019-02-25 NOTE — ED AVS SNAPSHOT
[facility] Emergency Department  [address] [facility] [address] [address]  Phone:  [phone]  Fax:  [phone]                                    Sherley Quijano   MRN: [id number]    Department:  [facility] Emergency Department   Date of Visit:  2/25/2019           After Visit Summary Signature Page    I have received my discharge instructions, and my questions have been answered. I have discussed any challenges I see with this plan with the nurse or doctor.    ..........................................................................................................................................  Patient/Patient Representative Signature      ..........................................................................................................................................  Patient Representative Print Name and Relationship to Patient    ..................................................               ................................................  Date                                   Time    ..........................................................................................................................................  Reviewed by Signature/Title    ...................................................              ..............................................  Date                                               Time          22EPIC Rev 08/18

## 2019-02-26 ENCOUNTER — APPOINTMENT (OUTPATIENT)
Dept: LAB | Facility: CLINIC | Age: 29
End: 2019-02-26
Payer: COMMERCIAL

## 2019-02-26 ENCOUNTER — TELEPHONE (OUTPATIENT)
Dept: FAMILY MEDICINE | Facility: CLINIC | Age: 29
End: 2019-02-26

## 2019-02-26 DIAGNOSIS — R19.7 DIARRHEA, UNSPECIFIED TYPE: ICD-10-CM

## 2019-02-26 DIAGNOSIS — R19.7 DIARRHEA, UNSPECIFIED TYPE: Primary | ICD-10-CM

## 2019-02-26 LAB
C DIFF TOX B STL QL: NEGATIVE
SPECIMEN SOURCE: NORMAL

## 2019-02-26 PROCEDURE — 87506 IADNA-DNA/RNA PROBE TQ 6-11: CPT | Performed by: NURSE PRACTITIONER

## 2019-02-26 PROCEDURE — 87493 C DIFF AMPLIFIED PROBE: CPT | Performed by: NURSE PRACTITIONER

## 2019-02-26 NOTE — TELEPHONE ENCOUNTER
[facility] called - patient was seen in ED and was given stool specimen containers and was told to bring them in and she would get orders and results from Lennie Rizzo.  Patient has brought the containers in and the lab needs orders -

## 2019-02-26 NOTE — TELEPHONE ENCOUNTER
Caller called back. Patient had orders at home of what was to be done. Orders need to be put in.  SSCE- Stool Culture  Norovirus  Rotovirus  C Diff

## 2019-02-26 NOTE — ED PROVIDER NOTES
"  History     Chief Complaint   Patient presents with     Nausea, Vomiting, & Diarrhea     abdominal pain Started early AM today  has continued      HPI  Sherley Quijano is a 28 year old female with abd pain beginning at ~ 1000 am, followed by nausea, vomiting and diarrhea. Diarrhea x many today (\"40\"). No s/sx of GI bleeding.  No fever or chills.  Poorly localized mid and upper abdominal cramping pain which is sharp, severe, radiates towards the flanks bilaterally and is refractory to antacid.  No prior abdominal surgeries or history of inflammatory bowel disease.  No significant recent travel or known infectious exposures.  o recent antibiotic use.  Similar episode 8 days ago x 1 day, severe and spontaneously resolved.  Daughter recently ill with diarrhea.    Allergies:  Allergies   Allergen Reactions     Kiwi Anaphylaxis     Throat swelling, difficulty breathing     Lanolin Hives     Latex Hives     Penicillins Rash       Problem List:    Patient Active Problem List    Diagnosis Date Noted     Chronic seasonal allergic rhinitis due to pollen 04/16/2018     Priority: Medium     Percutaneous skin puncture testing for aeroallergens performed on April 16, 2018 showed sensitivity to cat, dust mite (Dermatophagoides Farinae), tree pollen (Maple/Box Elder and oak), weed pollen (cocklebur, ragweed mix) and molds (Penicillium, Curvularia, Epicoccum, Aspergillus, Alternaria and Phoma).        Chronic allergic rhinitis due to animal hair and dander 04/16/2018     Priority: Medium     Allergic rhinitis due to dust mite 04/16/2018     Priority: Medium     Allergic conjunctivitis, bilateral 04/16/2018     Priority: Medium     IUGR (intrauterine growth restriction) affecting care of mother, third trimester, fetus 1 08/13/2016     Priority: Medium     Vaginal delivery 08/13/2016     Priority: Medium     SGA (small for gestational age) 08/10/2016     Priority: Medium     Sonogram 8/10/2016 the baby is quite small for gestational " age. <10%; normal TJ; borderline elevated S/D ratio;  I would like her to have BPP TWICE a week and a repeat growth scan in 2 weeks          Supervision of normal first pregnancy 08/10/2016     Priority: Medium     Prenatal care, first pregnancy 01/14/2016     Priority: Medium     It's a GIRL!    32 week growth US 7/13: EFW 14%ile, TJ 14 cm. ; started weekly BPP at 34 weeks  Repeat growth US at 36 weeks       Migraine headache 03/04/2011     Priority: Medium     On imitrex       CARDIOVASCULAR SCREENING; LDL GOAL LESS THAN 130 12/27/2010     Priority: Medium     Adverse reaction to viral vaccines 04/14/2010     Priority: Medium     Had reaction to 2nd hpv swelling and ill. Did not complete series         Bipolar affective disorder (H) 12/31/2009     Priority: Medium     Attention deficit disorder 07/23/2007     Priority: Medium     Mild intermittent asthma 06/16/2005     Priority: Medium     Dysfunction of eustachian tube 06/16/2005     Priority: Medium        Past Medical History:    Past Medical History:   Diagnosis Date     Chickenpox      Chlamydia        Past Surgical History:    Past Surgical History:   Procedure Laterality Date     D & C  2006    TAB     MOUTH SURGERY  2008    wisdom teeth       Family History:    Family History   Problem Relation Age of Onset     C.A.D. Maternal Grandfather         MI     Heart Disease Maternal Grandfather      Breast Cancer Maternal Grandmother      Depression Maternal Grandmother      Thyroid Disease Maternal Grandmother      Breast Cancer Paternal Grandmother      Alcohol/Drug Father      Allergies Father      Allergies Sister      Neurologic Disorder Sister         Narcolepsy     Substance Abuse Sister         recovered drugs     Substance Abuse Mother         recovered alcohol     Depression Mother      Thyroid Disease Mother      Other - See Comments Other         Heart Attack       Social History:  Marital Status:   [2]  Social History     Tobacco Use      "Smoking status: Former Smoker     Packs/day: 0.25     Types: Cigarettes     Last attempt to quit: 10/2017     Years since quittin.4     Smokeless tobacco: Never Used     Tobacco comment: Needle using ecgis since 2018   Substance Use Topics     Alcohol use: Yes     Alcohol/week: 0.0 oz     Comment: rare     Drug use: No        Medications:      norelgestromin-ethinyl estradiol (ORTHO EVRA) 150-35 MCG/24HR patch   ondansetron (ZOFRAN ODT) 4 MG ODT tab   oxyCODONE-acetaminophen (PERCOCET) 5-325 MG tablet   albuterol (ACCUNEB) 1.25 MG/3ML nebulizer solution   cyclobenzaprine (FLEXERIL) 10 MG tablet   EPINEPHrine (AUVI-Q) 0.3 MG/0.3ML injection 2-pack   loratadine (CLARITIN) 10 MG tablet       Review of Systems   Chronic right ear canal inflammation (? eczema)  As mentioned above in the history present illness.  All other systems were reviewed and are negative.    Physical Exam   BP: 146/89  Pulse: 88  Temp: 99  F (37.2  C)  Resp: 16  Height: 168.9 cm (5' 6.5\")  Weight: 78.5 kg (173 lb)  SpO2: 100 %      Physical Exam   Constitutional: She is oriented to person, place, and time. She appears well-developed and well-nourished. She appears distressed.   HENT:   Head: Normocephalic and atraumatic.   Mouth/Throat: Oropharynx is clear and moist.   Eyes: Conjunctivae and EOM are normal. No scleral icterus.   Neck: Normal range of motion. Neck supple. No tracheal deviation present.   Cardiovascular: Normal rate, regular rhythm and normal heart sounds. Exam reveals no gallop and no friction rub.   No murmur heard.  Pulmonary/Chest: Effort normal and breath sounds normal. No respiratory distress. She has no wheezes. She has no rales.   Abdominal: Soft. Normal appearance. She exhibits no distension, no abdominal bruit, no pulsatile midline mass and no mass. Bowel sounds are decreased. There is no hepatosplenomegaly. There is generalized tenderness. There is no rigidity, no rebound, no guarding and no CVA tenderness. No " hernia.       Musculoskeletal: Normal range of motion. She exhibits no edema or tenderness.   Neurological: She is alert and oriented to person, place, and time. Coordination normal.   Skin: Skin is warm and dry. No rash noted. She is not diaphoretic. No erythema. No pallor.   Psychiatric: She has a normal mood and affect. Her behavior is normal.   Nursing note and vitals reviewed.      ED Course        Procedures               Results for orders placed or performed during the hospital encounter of 02/25/19 (from the past 24 hour(s))   CBC with platelets differential   Result Value Ref Range    WBC 16.7 (H) 4.0 - 11.0 10e9/L    RBC Count 4.78 3.8 - 5.2 10e12/L    Hemoglobin 15.0 11.7 - 15.7 g/dL    Hematocrit 43.4 35.0 - 47.0 %    MCV 91 78 - 100 fl    MCH 31.4 26.5 - 33.0 pg    MCHC 34.6 31.5 - 36.5 g/dL    RDW 11.6 10.0 - 15.0 %    Platelet Count 320 150 - 450 10e9/L    Diff Method Automated Method     % Neutrophils 88.1 %    % Lymphocytes 7.5 %    % Monocytes 3.6 %    % Eosinophils 0.2 %    % Basophils 0.3 %    % Immature Granulocytes 0.3 %    Nucleated RBCs 0 0 /100    Absolute Neutrophil 14.7 (H) 1.6 - 8.3 10e9/L    Absolute Lymphocytes 1.3 0.8 - 5.3 10e9/L    Absolute Monocytes 0.6 0.0 - 1.3 10e9/L    Absolute Eosinophils 0.0 0.0 - 0.7 10e9/L    Absolute Basophils 0.1 0.0 - 0.2 10e9/L    Abs Immature Granulocytes 0.1 0 - 0.4 10e9/L    Absolute Nucleated RBC 0.0    Comprehensive metabolic panel   Result Value Ref Range    Sodium 137 133 - 144 mmol/L    Potassium 3.7 3.4 - 5.3 mmol/L    Chloride 105 94 - 109 mmol/L    Carbon Dioxide 24 20 - 32 mmol/L    Anion Gap 8 3 - 14 mmol/L    Glucose 115 (H) 70 - 99 mg/dL    Urea Nitrogen 20 7 - 30 mg/dL    Creatinine 0.85 0.52 - 1.04 mg/dL    GFR Estimate >90 >60 mL/min/[1.73_m2]    GFR Estimate If Black >90 >60 mL/min/[1.73_m2]    Calcium 9.2 8.5 - 10.1 mg/dL    Bilirubin Total 0.4 0.2 - 1.3 mg/dL    Albumin 4.1 3.4 - 5.0 g/dL    Protein Total 7.4 6.8 - 8.8 g/dL     Alkaline Phosphatase 50 40 - 150 U/L    ALT 17 0 - 50 U/L    AST 9 0 - 45 U/L   Lipase   Result Value Ref Range    Lipase 76 73 - 393 U/L   UA reflex to Microscopic and Culture   Result Value Ref Range    Color Urine Yellow     Appearance Urine Slightly Cloudy     Glucose Urine Negative NEG^Negative mg/dL    Bilirubin Urine Negative NEG^Negative    Ketones Urine 20 (A) NEG^Negative mg/dL    Specific Gravity Urine 1.028 1.003 - 1.035    Blood Urine Negative NEG^Negative    pH Urine 5.0 5.0 - 7.0 pH    Protein Albumin Urine Negative NEG^Negative mg/dL    Urobilinogen mg/dL 0.0 0.0 - 2.0 mg/dL    Nitrite Urine Negative NEG^Negative    Leukocyte Esterase Urine Small (A) NEG^Negative    Source Midstream Urine     RBC Urine 9 (H) 0 - 2 /HPF    WBC Urine 6 (H) 0 - 5 /HPF    Bacteria Urine Few (A) NEG^Negative /HPF    Squamous Epithelial /HPF Urine 11 (H) 0 - 1 /HPF    Mucous Urine Present (A) NEG^Negative /LPF    Calcium Oxalate Few (A) NEG^Negative /HPF   HCG qualitative urine (UPT)   Result Value Ref Range    HCG Qual Urine Negative NEG^Negative   CT Abdomen Pelvis w Contrast    Narrative    CT ABDOMEN AND PELVIS WITH CONTRAST 2/25/2019 10:43 PM     HISTORY: Abdominal pain, gastroenteritis or colitis suspected. Severe  mid abdominal pain, vomiting and diarrhea.    TECHNIQUE: Axial images from the lung bases to the symphysis are  performed with additional coronal reformatted images. 84 mL of Isovue  370 are given intravenously.  Radiation dose for this scan was reduced  using automated exposure control, adjustment of the mA and/or kV  according to patient size, or iterative reconstruction technique.    FINDINGS:     Tiny indeterminate 0.3 cm left lower lobe lung nodule is noted. Lung  bases are otherwise clear.    Abdomen: Upper abdominal organs are within normal limits including the  liver, spleen, gallbladder, pancreas, adrenal glands and kidneys. No  hydronephrosis or obvious urinary tract calculi. Colonic  wall  thickening is noted from the cecum to the distal sigmoid colon  consistent with pancolitis. Infectious etiology is possible. Appendix  is normal.    Pelvis: The bladder, uterus, ovaries and rectum are unremarkable. No  enlarged pelvic lymph nodes. Small amount free pelvic fluid is noted  likely related to the colonic inflammation, but could also be  physiologic in nature. Bone window examination is unremarkable.      Impression    IMPRESSION: Probable infectious pancolitis. No bowel obstruction.  Appendix is normal. Abdominal and pelvic organs are otherwise within  normal limits.         Medications   0.9% sodium chloride BOLUS (1,000 mLs Intravenous New Bag 2/25/19 2025)     Followed by   sodium chloride 0.9% infusion (0 mLs Intravenous Stopped 2/25/19 2225)   ondansetron (ZOFRAN) injection 4 mg (4 mg Intravenous Given 2/25/19 2117)   HYDROmorphone (PF) (DILAUDID) injection 0.5 mg (0.5 mg Intravenous Given 2/25/19 2114)   ketorolac (TORADOL) injection 30 mg (30 mg Intravenous Given 2/25/19 2223)   lactated ringers BOLUS 1,000 mL (1,000 mLs Intravenous New Bag 2/25/19 2223)   iopamidol (ISOVUE-370) solution 84 mL (84 mLs Intravenous Given 2/25/19 2233)   Saline Flush (61 mLs Intravenous Given 2/25/19 2233)   loperamide (IMODIUM) capsule 2 mg (2 mg Oral Given 2/25/19 2321)   oxyCODONE-acetaminophen (PERCOCET) 5-325 MG per tablet 2 tablet (2 tablets Oral Given 2/25/19 2321)     Reexamined after Dilaudid. Still has significant poorly localized mid abdominal pain. We discussed risks and benefits of CT scanning and the radiation exposure and she is comfortable proceeding with CT imaging due to the unusual severity of her pain.    11:23 PM - Feeling further improved now and comfortable discharge home.  We will send stool for evaluation, give 2 Percocet and Imodium therapy.  She and her significant other are comfortable with discharge.      Assessments & Plan (with Medical Decision Making)   Abdominal pain, nausea,  vomiting and frequent watery stool without blood or mucus with evidence of pancolitis on CT study.  Elevated WBC but no fever or other evidence of dysentery.  Much improved after IV fluids, Dilaudid and Toradol IV and Zofran IV.  Comfortable discharge home.  Will Rx Percocet X 12 tablets, Zofran and have her use Imodium.  Stool sent for evaluation.  Clinic recheck in approximately 2 days.  Probable infectious colitis, doubt inflammatory bowel disease or ischemic bowel disease.  No CT evidence of obstruction.  She was provided instructions for supportive care and will return as needed for worsened condition or worsening symptoms, or new problems or concerns.      I have reviewed the nursing notes.    I have reviewed the findings, diagnosis, plan and need for follow up with the patient.       Medication List      Started    ondansetron 4 MG ODT tab  Commonly known as:  ZOFRAN ODT  4-8 mg, Oral, EVERY 8 HOURS PRN     oxyCODONE-acetaminophen 5-325 MG tablet  Commonly known as:  PERCOCET  1-2 tablets, Oral, EVERY 4 HOURS PRN            Final diagnoses:   Nausea vomiting and diarrhea   Colitis   Dehydration       2/25/2019   [facility] EMERGENCY DEPARTMENT     Minh Johnson MD  02/25/19 [id number]

## 2019-02-26 NOTE — PROGRESS NOTES
SUBJECTIVE:   Sherley Quijano is a 28 year old female who presents to clinic today for the following health issues:      ED/UC Followup:    Facility:  [facility]  Date of visit: 2019  Reason for visit: nausea, vomiting and diarrhea, colitis, dehydration   Current Status: Still feeling dizzy, abdominal pain that comes and goes. Worse with eating.      Significantly improved since ED visit but still not able to eat much. Getting down some bland foods and fluids.  Scared to eat due to significant abdominal pain.  No diarrhea today.     Having to take percocet every 4 hours in order to be able to stand up straight from the pain.    Problem list and histories reviewed & adjusted, as indicated.  Additional history: as documented    Patient Active Problem List   Diagnosis     Mild intermittent asthma     Dysfunction of eustachian tube     Attention deficit disorder     Bipolar affective disorder (H)     Adverse reaction to viral vaccines     CARDIOVASCULAR SCREENING; LDL GOAL LESS THAN 130     Migraine headache     Prenatal care, first pregnancy     SGA (small for gestational age)     Supervision of normal first pregnancy     IUGR (intrauterine growth restriction) affecting care of mother, third trimester, fetus 1     Vaginal delivery     Chronic seasonal allergic rhinitis due to pollen     Chronic allergic rhinitis due to animal hair and dander     Allergic rhinitis due to dust mite     Allergic conjunctivitis, bilateral     Past Surgical History:   Procedure Laterality Date     D & C      TAB     MOUTH SURGERY  2008    wisdom teeth       Social History     Tobacco Use     Smoking status: Former Smoker     Packs/day: 0.25     Types: Cigarettes     Last attempt to quit: 10/2017     Years since quittin.4     Smokeless tobacco: Never Used     Tobacco comment: Needle using ecgis since 2018   Substance Use Topics     Alcohol use: Yes     Alcohol/week: 0.0 oz     Comment: rare     Family History   Problem Relation  Age of Onset     C.A.D. Maternal Grandfather         MI     Heart Disease Maternal Grandfather      Breast Cancer Maternal Grandmother      Depression Maternal Grandmother      Thyroid Disease Maternal Grandmother      Breast Cancer Paternal Grandmother      Alcohol/Drug Father      Allergies Father      Allergies Sister      Neurologic Disorder Sister         Narcolepsy     Substance Abuse Sister         recovered drugs     Substance Abuse Mother         recovered alcohol     Depression Mother      Thyroid Disease Mother      Other - See Comments Other         Heart Attack         Current Outpatient Medications   Medication Sig Dispense Refill     albuterol (ACCUNEB) 1.25 MG/3ML nebulizer solution Take 3 mLs by nebulization every 4 hours as needed for shortness of breath / dyspnea. 1 Box 3     cyclobenzaprine (FLEXERIL) 10 MG tablet Take 1 tablet (10 mg) by mouth 3 times daily as needed for muscle spasms 30 tablet 0     EPINEPHrine (AUVI-Q) 0.3 MG/0.3ML injection 2-pack Inject 0.3 mLs (0.3 mg) into the muscle as needed for anaphylaxis 0.6 mL 1     loratadine (CLARITIN) 10 MG tablet Take 1 tablet (10 mg) by mouth daily as needed for allergies 30 tablet 3     norelgestromin-ethinyl estradiol (ORTHO EVRA) 150-35 MCG/24HR patch Remove old patch and apply new patch onto the skin once a week for 3 weeks (21 days). Do not wear patch week 4 (days 22-28), then repeat. 9 patch 3     ondansetron (ZOFRAN ODT) 4 MG ODT tab Take 1-2 tablets (4-8 mg) by mouth every 8 hours as needed for nausea or vomiting 10 tablet 2     oxyCODONE-acetaminophen (PERCOCET) 5-325 MG tablet Take 1 tablet by mouth every 4 hours as needed for pain 20 tablet 0     Allergies   Allergen Reactions     Kiwi Anaphylaxis     Throat swelling, difficulty breathing     Lanolin Hives     Latex Hives     Penicillins Rash     Labs reviewed in EPIC    Reviewed and updated as needed this visit by clinical staff       Reviewed and updated as needed this visit by  "Provider         ROS:  Constitutional, HEENT, cardiovascular, pulmonary, gi and gu systems are negative, except as otherwise noted.    OBJECTIVE:     /66 (Cuff Size: Adult Large)   Pulse 88   Temp 98.1  F (36.7  C) (Tympanic)   Resp 18   Ht 1.689 m (5' 6.5\")   Wt 75.8 kg (167 lb)   BMI 26.55 kg/m    Body mass index is 26.55 kg/m .  GENERAL: healthy, alert and no distress  NECK: no adenopathy, no asymmetry, masses, or scars and thyroid normal to palpation  RESP: lungs clear to auscultation - no rales, rhonchi or wheezes  CV: regular rate and rhythm, normal S1 S2, no S3 or S4, no murmur, click or rub, no peripheral edema and peripheral pulses strong  ABDOMEN: tenderness RUQ, LUQ, suprapubic, RLQ and LLQ and bowel sounds normal  MS: no gross musculoskeletal defects noted, no edema  PSYCH: mentation appears normal and affect flat    Diagnostic Test Results:  Results for orders placed or performed in visit on 02/27/19   Basic metabolic panel   Result Value Ref Range    Sodium 139 133 - 144 mmol/L    Potassium 3.7 3.4 - 5.3 mmol/L    Chloride 107 94 - 109 mmol/L    Carbon Dioxide 25 20 - 32 mmol/L    Anion Gap 7 3 - 14 mmol/L    Glucose 104 (H) 70 - 99 mg/dL    Urea Nitrogen 6 (L) 7 - 30 mg/dL    Creatinine 0.86 0.52 - 1.04 mg/dL    GFR Estimate >90 >60 mL/min/[1.73_m2]    GFR Estimate If Black >90 >60 mL/min/[1.73_m2]    Calcium 8.4 (L) 8.5 - 10.1 mg/dL   CBC with platelets and differential   Result Value Ref Range    WBC 5.5 4.0 - 11.0 10e9/L    RBC Count 4.03 3.8 - 5.2 10e12/L    Hemoglobin 12.4 11.7 - 15.7 g/dL    Hematocrit 37.6 35.0 - 47.0 %    MCV 93 78 - 100 fl    MCH 30.8 26.5 - 33.0 pg    MCHC 33.0 31.5 - 36.5 g/dL    RDW 11.8 10.0 - 15.0 %    Platelet Count 217 150 - 450 10e9/L    % Neutrophils 69.1 %    % Lymphocytes 20.5 %    % Monocytes 8.6 %    % Eosinophils 1.6 %    % Basophils 0.2 %    Absolute Neutrophil 3.8 1.6 - 8.3 10e9/L    Absolute Lymphocytes 1.1 0.8 - 5.3 10e9/L    Absolute Monocytes " 0.5 0.0 - 1.3 10e9/L    Absolute Eosinophils 0.1 0.0 - 0.7 10e9/L    Absolute Basophils 0.0 0.0 - 0.2 10e9/L    Diff Method Automated Method        ASSESSMENT/PLAN:     1. Abdominal pain, generalized  No acute distress. WBC returned to normal.  Percocet refilled-discussion on decreasing use.  GI referral placed for further evaluation.  Symptomatic care and follow up discussed.  - Basic metabolic panel  - CBC with platelets and differential  - oxyCODONE-acetaminophen (PERCOCET) 5-325 MG tablet; Take 1 tablet by mouth every 4 hours as needed for pain  Dispense: 20 tablet; Refill: 0  - GASTROENTEROLOGY ADULT REF CONSULT ONLY    Home care instructions were reviewed with the patient. The risks, benefits and treatment options of prescribed medications or other treatments have been discussed with the patient. The patient verbalized their understanding and should call or follow up if no improvement or if they develop further problems.    Patient Instructions   Percocet refilled-work to decrease use    Labs today-we will notify you with those results    GI referral placed    Follow up if symptoms do not improve or worsen.        MASHA Orellana [facility]

## 2019-02-26 NOTE — ED NOTES
Patient went to work today was unable to stay at work because of abdominal pain and N/V/D  patient is alert oriented.

## 2019-02-27 ENCOUNTER — OFFICE VISIT (OUTPATIENT)
Dept: FAMILY MEDICINE | Facility: CLINIC | Age: 29
End: 2019-02-27
Payer: COMMERCIAL

## 2019-02-27 VITALS
WEIGHT: 167 LBS | BODY MASS INDEX: 26.21 KG/M2 | RESPIRATION RATE: 18 BRPM | TEMPERATURE: 98.1 F | DIASTOLIC BLOOD PRESSURE: 66 MMHG | HEART RATE: 88 BPM | HEIGHT: 67 IN | SYSTOLIC BLOOD PRESSURE: 120 MMHG

## 2019-02-27 DIAGNOSIS — R10.84 ABDOMINAL PAIN, GENERALIZED: Primary | ICD-10-CM

## 2019-02-27 LAB
ANION GAP SERPL CALCULATED.3IONS-SCNC: 7 MMOL/L (ref 3–14)
BASOPHILS # BLD AUTO: 0 10E9/L (ref 0–0.2)
BASOPHILS NFR BLD AUTO: 0.2 %
BUN SERPL-MCNC: 6 MG/DL (ref 7–30)
C COLI+JEJUNI+LARI FUSA STL QL NAA+PROBE: NOT DETECTED
CALCIUM SERPL-MCNC: 8.4 MG/DL (ref 8.5–10.1)
CHLORIDE SERPL-SCNC: 107 MMOL/L (ref 94–109)
CO2 SERPL-SCNC: 25 MMOL/L (ref 20–32)
CREAT SERPL-MCNC: 0.86 MG/DL (ref 0.52–1.04)
DIFFERENTIAL METHOD BLD: NORMAL
EC STX1 GENE STL QL NAA+PROBE: NOT DETECTED
EC STX2 GENE STL QL NAA+PROBE: NOT DETECTED
ENTERIC PATHOGEN COMMENT: NORMAL
EOSINOPHIL # BLD AUTO: 0.1 10E9/L (ref 0–0.7)
EOSINOPHIL NFR BLD AUTO: 1.6 %
ERYTHROCYTE [DISTWIDTH] IN BLOOD BY AUTOMATED COUNT: 11.8 % (ref 10–15)
GFR SERPL CREATININE-BSD FRML MDRD: >90 ML/MIN/{1.73_M2}
GLUCOSE SERPL-MCNC: 104 MG/DL (ref 70–99)
HCT VFR BLD AUTO: 37.6 % (ref 35–47)
HGB BLD-MCNC: 12.4 G/DL (ref 11.7–15.7)
LYMPHOCYTES # BLD AUTO: 1.1 10E9/L (ref 0.8–5.3)
LYMPHOCYTES NFR BLD AUTO: 20.5 %
MCH RBC QN AUTO: 30.8 PG (ref 26.5–33)
MCHC RBC AUTO-ENTMCNC: 33 G/DL (ref 31.5–36.5)
MCV RBC AUTO: 93 FL (ref 78–100)
MONOCYTES # BLD AUTO: 0.5 10E9/L (ref 0–1.3)
MONOCYTES NFR BLD AUTO: 8.6 %
NEUTROPHILS # BLD AUTO: 3.8 10E9/L (ref 1.6–8.3)
NEUTROPHILS NFR BLD AUTO: 69.1 %
NOROV GI+II ORF1-ORF2 JNC STL QL NAA+PR: NOT DETECTED
PLATELET # BLD AUTO: 217 10E9/L (ref 150–450)
POTASSIUM SERPL-SCNC: 3.7 MMOL/L (ref 3.4–5.3)
RBC # BLD AUTO: 4.03 10E12/L (ref 3.8–5.2)
RVA NSP5 STL QL NAA+PROBE: NOT DETECTED
SALMONELLA SP RPOD STL QL NAA+PROBE: NOT DETECTED
SHIGELLA SP+EIEC IPAH STL QL NAA+PROBE: NOT DETECTED
SODIUM SERPL-SCNC: 139 MMOL/L (ref 133–144)
V CHOL+PARA RFBL+TRKH+TNAA STL QL NAA+PR: NOT DETECTED
WBC # BLD AUTO: 5.5 10E9/L (ref 4–11)
Y ENTERO RECN STL QL NAA+PROBE: NOT DETECTED

## 2019-02-27 PROCEDURE — 80048 BASIC METABOLIC PNL TOTAL CA: CPT | Performed by: NURSE PRACTITIONER

## 2019-02-27 PROCEDURE — 85025 COMPLETE CBC W/AUTO DIFF WBC: CPT | Performed by: NURSE PRACTITIONER

## 2019-02-27 PROCEDURE — 36415 COLL VENOUS BLD VENIPUNCTURE: CPT | Performed by: NURSE PRACTITIONER

## 2019-02-27 PROCEDURE — 99214 OFFICE O/P EST MOD 30 MIN: CPT | Performed by: NURSE PRACTITIONER

## 2019-02-27 RX ORDER — OXYCODONE AND ACETAMINOPHEN 5; 325 MG/1; MG/1
1 TABLET ORAL EVERY 4 HOURS PRN
Qty: 20 TABLET | Refills: 0 | Status: SHIPPED | OUTPATIENT
Start: 2019-02-27 | End: 2020-01-13

## 2019-02-27 ASSESSMENT — MIFFLIN-ST. JEOR: SCORE: 1512.2

## 2019-02-27 NOTE — PATIENT INSTRUCTIONS
Percocet refilled-work to decrease use    Labs today-we will notify you with those results    GI referral placed    Follow up if symptoms do not improve or worsen.

## 2019-02-28 ASSESSMENT — ASTHMA QUESTIONNAIRES: ACT_TOTALSCORE: 25

## 2019-03-21 ENCOUNTER — HOSPITAL ENCOUNTER (EMERGENCY)
Facility: CLINIC | Age: 29
Discharge: HOME | End: 2019-03-21
Attending: FAMILY MEDICINE | Admitting: FAMILY MEDICINE
Payer: OTHER MISCELLANEOUS

## 2019-03-21 ENCOUNTER — APPOINTMENT (OUTPATIENT)
Dept: GENERAL RADIOLOGY | Facility: CLINIC | Age: 29
End: 2019-03-21
Attending: FAMILY MEDICINE
Payer: OTHER MISCELLANEOUS

## 2019-03-21 VITALS
RESPIRATION RATE: 16 BRPM | BODY MASS INDEX: 26.71 KG/M2 | DIASTOLIC BLOOD PRESSURE: 87 MMHG | WEIGHT: 168 LBS | TEMPERATURE: 98.3 F | OXYGEN SATURATION: 98 % | SYSTOLIC BLOOD PRESSURE: 128 MMHG

## 2019-03-21 DIAGNOSIS — Z77.098 EXPOSURE TO CHEMICAL INHALATION: ICD-10-CM

## 2019-03-21 DIAGNOSIS — R06.02 SHORTNESS OF BREATH: ICD-10-CM

## 2019-03-21 DIAGNOSIS — R51.9 NONINTRACTABLE HEADACHE, UNSPECIFIED CHRONICITY PATTERN, UNSPECIFIED HEADACHE TYPE: ICD-10-CM

## 2019-03-21 LAB
ANION GAP SERPL CALCULATED.3IONS-SCNC: 5 MMOL/L (ref 3–14)
BASOPHILS # BLD AUTO: 0.1 10E9/L (ref 0–0.2)
BASOPHILS NFR BLD AUTO: 0.7 %
BUN SERPL-MCNC: 16 MG/DL (ref 7–30)
CALCIUM SERPL-MCNC: 8.9 MG/DL (ref 8.5–10.1)
CHLORIDE SERPL-SCNC: 107 MMOL/L (ref 94–109)
CO2 SERPL-SCNC: 28 MMOL/L (ref 20–32)
COHGB MFR BLD: 3.6 % (ref 0–2)
CREAT SERPL-MCNC: 0.82 MG/DL (ref 0.52–1.04)
D DIMER PPP FEU-MCNC: 0.4 UG/ML FEU (ref 0–0.5)
DIFFERENTIAL METHOD BLD: NORMAL
EOSINOPHIL # BLD AUTO: 0.3 10E9/L (ref 0–0.7)
EOSINOPHIL NFR BLD AUTO: 3.7 %
ERYTHROCYTE [DISTWIDTH] IN BLOOD BY AUTOMATED COUNT: 11.9 % (ref 10–15)
GFR SERPL CREATININE-BSD FRML MDRD: >90 ML/MIN/{1.73_M2}
GLUCOSE SERPL-MCNC: 86 MG/DL (ref 70–99)
HCT VFR BLD AUTO: 45.7 % (ref 35–47)
HGB BLD-MCNC: 15.2 G/DL (ref 11.7–15.7)
IMM GRANULOCYTES # BLD: 0 10E9/L (ref 0–0.4)
IMM GRANULOCYTES NFR BLD: 0.2 %
LYMPHOCYTES # BLD AUTO: 2.6 10E9/L (ref 0.8–5.3)
LYMPHOCYTES NFR BLD AUTO: 30.8 %
MCH RBC QN AUTO: 30.8 PG (ref 26.5–33)
MCHC RBC AUTO-ENTMCNC: 33.3 G/DL (ref 31.5–36.5)
MCV RBC AUTO: 93 FL (ref 78–100)
MONOCYTES # BLD AUTO: 0.4 10E9/L (ref 0–1.3)
MONOCYTES NFR BLD AUTO: 5.3 %
NEUTROPHILS # BLD AUTO: 5 10E9/L (ref 1.6–8.3)
NEUTROPHILS NFR BLD AUTO: 59.3 %
NRBC # BLD AUTO: 0 10*3/UL
NRBC BLD AUTO-RTO: 0 /100
PLATELET # BLD AUTO: 315 10E9/L (ref 150–450)
POTASSIUM SERPL-SCNC: 3.8 MMOL/L (ref 3.4–5.3)
RBC # BLD AUTO: 4.94 10E12/L (ref 3.8–5.2)
SODIUM SERPL-SCNC: 140 MMOL/L (ref 133–144)
WBC # BLD AUTO: 8.4 10E9/L (ref 4–11)

## 2019-03-21 PROCEDURE — 25000132 ZZH RX MED GY IP 250 OP 250 PS 637: Performed by: FAMILY MEDICINE

## 2019-03-21 PROCEDURE — 85379 FIBRIN DEGRADATION QUANT: CPT | Performed by: FAMILY MEDICINE

## 2019-03-21 PROCEDURE — 80048 BASIC METABOLIC PNL TOTAL CA: CPT | Performed by: FAMILY MEDICINE

## 2019-03-21 PROCEDURE — 85025 COMPLETE CBC W/AUTO DIFF WBC: CPT | Performed by: FAMILY MEDICINE

## 2019-03-21 PROCEDURE — 82375 ASSAY CARBOXYHB QUANT: CPT | Performed by: FAMILY MEDICINE

## 2019-03-21 PROCEDURE — 71046 X-RAY EXAM CHEST 2 VIEWS: CPT

## 2019-03-21 PROCEDURE — 99284 EMERGENCY DEPT VISIT MOD MDM: CPT | Mod: Z6 | Performed by: FAMILY MEDICINE

## 2019-03-21 PROCEDURE — 99284 EMERGENCY DEPT VISIT MOD MDM: CPT | Mod: 25

## 2019-03-21 RX ORDER — SULINDAC 200 MG/1
200 TABLET ORAL 2 TIMES DAILY
Status: DISCONTINUED | OUTPATIENT
Start: 2019-03-21 | End: 2019-03-21 | Stop reason: HOSPADM

## 2019-03-21 RX ADMIN — SULINDAC 200 MG: 200 TABLET ORAL at 15:01

## 2019-03-21 ASSESSMENT — ENCOUNTER SYMPTOMS
SINUS PRESSURE: 1
HEADACHES: 1
SHORTNESS OF BREATH: 1
PALPITATIONS: 0
COUGH: 0
DIAPHORESIS: 0
SORE THROAT: 0
DIARRHEA: 0
CONSTIPATION: 0
FEVER: 0
DIZZINESS: 1
DYSURIA: 0
VOMITING: 0
BLOOD IN STOOL: 0
ABDOMINAL PAIN: 0
NAUSEA: 1
WHEEZING: 0
CHILLS: 0
FREQUENCY: 0

## 2019-03-21 NOTE — ED NOTES
Chemical exposure to Epoxy at work.  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  Non-productive  coughing, trouble taking deep breaths, ear pain, and facial redness.  Patient brought MSDS for chemicals with her.  Irvin Simpson, RN on 3/21/2019 at 2:11 PM

## 2019-03-21 NOTE — LETTER
[facility] EMERGENCY DEPARTMENT  [address] [address]  Phone: [phone]  Fax: [phone]      REPORT OF WORK ABILITY    NOTE TO EMPLOYEE: You must promptly provide a copy of this report to your  employer or worker's compensation insurer, and Qualified Rehabilitation Consultant.    Date: 3/21/2019                     Employee Name: Sherley Quijano         YOB: 1990  Medical Record Number: [id number]   Soc.Sec.No: [id number]  Employer:  [employer]  Date of Injury: March 7, 2019  Managed Care Organization / Insurance Company Name: UNKNOWN    Diagnosis:     ICD-10-CM    1. Exposure to chemical inhalation Z77.098     no serious findings on testing, but association of symptoms with exposure, and underlying history of asthma would warrant limiting exposure.  consider follow-up with [facility] if persistent symptoms after ceasing exposure.   2. Shortness of breath R06.02     secondary causes of shortness of breath were evaluated and no other frindings identified   3. Nonintractable headache, unspecified chronicity pattern, unspecified headache type R51        Work Related: yes     MMI: NO   Permanent Partial Disability(PPD) likely: UNKNOWN    EMPLOYEE IS ABLE TO WORK: OFF work for remainder of shift and Return to work with restrictions on March 22, 2019     RESTRICTIONS IF ANY:     Avoid further exposure to inciting chemicals until further evaluation by occupational medicine.     OTHER RESTRICTIONS: None    TREATMENT PLAN/NOTES: .        ICD-10-CM    1. Exposure to chemical inhalation Z77.098     no serious findings on testing, but association of symptoms with exposure, and underlying history of asthma would warrant limiting exposure.  consider follow-up with [facility] if persistent symptoms after ceasing exposure.   2. Shortness of breath R06.02     secondary causes of shortness of breath were evaluated and no other frindings identified   3. Nonintractable  headache, unspecified chronicity pattern, unspecified headache type R51

## 2019-03-21 NOTE — ED PROVIDER NOTES
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  associate symptoms include non-productive cough at night, difficult to take deep breaths, facial redness, ear pain.  temporal and central forehead headache.  no chest pain or palpitations  asthma history - mild intermittent as needed albuterol   No wheezing.   facial pressure.  ear drainage and nasal drainage.    prior migraines but with current headaches that are different than typical - and these have been daily.    similar symptoms in many of coworkers.      Is using contraceptive patch, no current pregnancy.  LMP was 4 days ago  Denies recent prolonged travel (>3 hours) by car or plane, history or FHx of venous thromboembolism, recent surgery (last 4 weeks), active cancer history, hypercoagulable state,  or other medications/conditions causing VTE or  new unilateral swelling or pain in the legs or calves.    patient brings OSHA forms to ED related to 3 different exposures.   master Top SRS 41p  master top srs 61bc  master top srs 71tc  reviewed - primarily respiratory irritants - no specific management      Allergies:  Allergies   Allergen Reactions     Kiwi Anaphylaxis     Throat swelling, difficulty breathing     Lanolin Hives     Latex Hives     Penicillins Rash       Problem List:    Patient Active Problem List    Diagnosis Date Noted     Chronic seasonal allergic rhinitis due to pollen 04/16/2018     Priority: Medium     Percutaneous skin puncture testing for aeroallergens performed on April 16, 2018 showed sensitivity to cat, dust mite (Dermatophagoides Farinae), tree pollen (Maple/Box Elder and oak), weed pollen (cocklebur, ragweed  mix) and molds (Penicillium, Curvularia, Epicoccum, Aspergillus, Alternaria and Phoma).        Chronic allergic rhinitis due to animal hair and dander 04/16/2018     Priority: Medium     Allergic rhinitis due to dust mite 04/16/2018     Priority: Medium     Allergic conjunctivitis, bilateral 04/16/2018     Priority: Medium     IUGR (intrauterine growth restriction) affecting care of mother, third trimester, fetus 1 08/13/2016     Priority: Medium     Vaginal delivery 08/13/2016     Priority: Medium     SGA (small for gestational age) 08/10/2016     Priority: Medium     Sonogram 8/10/2016 the baby is quite small for gestational age. <10%; normal TJ; borderline elevated S/D ratio;  I would like her to have BPP TWICE a week and a repeat growth scan in 2 weeks          Supervision of normal first pregnancy 08/10/2016     Priority: Medium     Prenatal care, first pregnancy 01/14/2016     Priority: Medium     It's a GIRL!    32 week growth US 7/13: EFW 14%ile, TJ 14 cm. ; started weekly BPP at 34 weeks  Repeat growth US at 36 weeks       Migraine headache 03/04/2011     Priority: Medium     On imitrex       CARDIOVASCULAR SCREENING; LDL GOAL LESS THAN 130 12/27/2010     Priority: Medium     Adverse reaction to viral vaccines 04/14/2010     Priority: Medium     Had reaction to 2nd hpv swelling and ill. Did not complete series         Bipolar affective disorder (H) 12/31/2009     Priority: Medium     Attention deficit disorder 07/23/2007     Priority: Medium     Mild intermittent asthma 06/16/2005     Priority: Medium     Dysfunction of eustachian tube 06/16/2005     Priority: Medium        Past Medical History:    Past Medical History:   Diagnosis Date     Chickenpox      Chlamydia        Past Surgical History:    Past Surgical History:   Procedure Laterality Date     D & C  2006    TAB     MOUTH SURGERY  2008    wisdom teeth       Family History:    Family History   Problem Relation Age of Onset     C.A.D. Maternal  Grandfather         MI     Heart Disease Maternal Grandfather      Breast Cancer Maternal Grandmother      Depression Maternal Grandmother      Thyroid Disease Maternal Grandmother      Breast Cancer Paternal Grandmother      Alcohol/Drug Father      Allergies Father      Allergies Sister      Neurologic Disorder Sister         Narcolepsy     Substance Abuse Sister         recovered drugs     Substance Abuse Mother         recovered alcohol     Depression Mother      Thyroid Disease Mother      Other - See Comments Other         Heart Attack       Social History:  Marital Status:   [2]  Social History     Tobacco Use     Smoking status: Former Smoker     Packs/day: 0.25     Types: Cigarettes     Last attempt to quit: 10/2017     Years since quittin.4     Smokeless tobacco: Never Used     Tobacco comment: Needle using ecgis since 2018   Substance Use Topics     Alcohol use: Yes     Alcohol/week: 0.0 oz     Comment: rare     Drug use: No        Medications:      albuterol (ACCUNEB) 1.25 MG/3ML nebulizer solution   cyclobenzaprine (FLEXERIL) 10 MG tablet   EPINEPHrine (AUVI-Q) 0.3 MG/0.3ML injection 2-pack   loratadine (CLARITIN) 10 MG tablet   norelgestromin-ethinyl estradiol (ORTHO EVRA) 150-35 MCG/24HR patch   ondansetron (ZOFRAN ODT) 4 MG ODT tab   oxyCODONE-acetaminophen (PERCOCET) 5-325 MG tablet         Review of Systems   Constitutional: Negative for chills, diaphoresis and fever.   HENT: Positive for sinus pressure. Negative for ear pain and sore throat.    Eyes: Negative for visual disturbance.   Respiratory: Positive for shortness of breath. Negative for cough and wheezing.    Cardiovascular: Negative for chest pain and palpitations.   Gastrointestinal: Positive for nausea. Negative for abdominal pain, blood in stool, constipation, diarrhea and vomiting.   Genitourinary: Positive for vaginal bleeding (menses). Negative for dysuria, frequency and urgency.   Skin: Negative for rash.    Neurological: Positive for dizziness and headaches.   All other systems reviewed and are negative.      Physical Exam   BP: (!) 146/95  Heart Rate: 89  Temp: 98.3  F (36.8  C)  Resp: 16  Weight: 76.2 kg (168 lb)  SpO2: 98 %      Physical Exam   Constitutional: She appears distressed.   HENT:   Mouth/Throat: Oropharynx is clear and moist.   Eyes: Conjunctivae are normal.   Neck: Neck supple.   Cardiovascular: Normal rate, regular rhythm and normal heart sounds. Exam reveals no friction rub.   No murmur heard.  Pulmonary/Chest: Effort normal and breath sounds normal. No stridor. No respiratory distress. She has no wheezes. She has no rales.   Abdominal: Soft. Bowel sounds are normal. She exhibits no distension and no mass. There is no tenderness. There is no guarding.   Musculoskeletal: She exhibits no edema.   Neurological: She is alert. She exhibits normal muscle tone.   Skin: No rash noted. She is not diaphoretic. No pallor.       ED Course        Procedures               Critical Care time:  none               Results for orders placed or performed during the hospital encounter of 03/21/19 (from the past 24 hour(s))   Carbon monoxide   Result Value Ref Range    Carbon Monoxide 3.6 (H) 0 - 2 %   D dimer quantitative   Result Value Ref Range    D Dimer 0.4 0.0 - 0.50 ug/ml FEU   CBC with platelets, differential   Result Value Ref Range    WBC 8.4 4.0 - 11.0 10e9/L    RBC Count 4.94 3.8 - 5.2 10e12/L    Hemoglobin 15.2 11.7 - 15.7 g/dL    Hematocrit 45.7 35.0 - 47.0 %    MCV 93 78 - 100 fl    MCH 30.8 26.5 - 33.0 pg    MCHC 33.3 31.5 - 36.5 g/dL    RDW 11.9 10.0 - 15.0 %    Platelet Count 315 150 - 450 10e9/L    Diff Method Automated Method     % Neutrophils 59.3 %    % Lymphocytes 30.8 %    % Monocytes 5.3 %    % Eosinophils 3.7 %    % Basophils 0.7 %    % Immature Granulocytes 0.2 %    Nucleated RBCs 0 0 /100    Absolute Neutrophil 5.0 1.6 - 8.3 10e9/L    Absolute Lymphocytes 2.6 0.8 - 5.3 10e9/L    Absolute  Monocytes 0.4 0.0 - 1.3 10e9/L    Absolute Eosinophils 0.3 0.0 - 0.7 10e9/L    Absolute Basophils 0.1 0.0 - 0.2 10e9/L    Abs Immature Granulocytes 0.0 0 - 0.4 10e9/L    Absolute Nucleated RBC 0.0    Basic metabolic panel   Result Value Ref Range    Sodium 140 133 - 144 mmol/L    Potassium 3.8 3.4 - 5.3 mmol/L    Chloride 107 94 - 109 mmol/L    Carbon Dioxide 28 20 - 32 mmol/L    Anion Gap 5 3 - 14 mmol/L    Glucose 86 70 - 99 mg/dL    Urea Nitrogen 16 7 - 30 mg/dL    Creatinine 0.82 0.52 - 1.04 mg/dL    GFR Estimate >90 >60 mL/min/[1.73_m2]    GFR Estimate If Black >90 >60 mL/min/[1.73_m2]    Calcium 8.9 8.5 - 10.1 mg/dL   Chest XR,  PA & LAT    Narrative    XR CHEST 2 VW 3/21/2019 3:51 PM    HISTORY: Chemical exposure. Cough.    COMPARISON: None.    FINDINGS: No airspace consolidation, pleural effusion or pneumothorax.  Normal heart size.      Impression    IMPRESSION: Normal chest.    ALLISON MICHAEL MD       Medications - No data to display    Assessments & Plan (with Medical Decision Making)     MDM: Sherley Quijano is a 28 year old female who presented with exposure to several cleaning substances she brings the OSHA documentation from her work site with and is described above.  She has a history of mild intermittent asthma and described 2 weeks of respiratory symptoms that she attributed to this.  But she also had more generalized symptoms including a moderate to severe headache that was not relieved with her ibuprofen and a sense of dyspnea on exertion and that was not associated with wheezes.  She was also on contraceptive patch which raise the risk of pulmonary embolism and therefore a more broad-based evaluation was pursued to exclude other causes including carbon monoxide exposure she is currently working on a worksite and there are others at work to have similar headache type symptoms.  This result was relatively normal.  She also had a d-dimer test that was normal.  Chest x-ray was clear.  Other laboratory  testing including blood count was normal without anemia and her chemistry panel was performed in preparation for possible PE study but was not needed.  The remainder of her history and evaluation are reassuring and I have asked her to follow-up in clinic with her primary provider and to also consider act health follow-up.  I have written a letter for work regarding restrictions.  Precautions are given for return.      I have reviewed the nursing notes.    I have reviewed the findings, diagnosis, plan and need for follow up with the patient.          Medication List      There are no discharge medications for this visit.         Final diagnoses:   Exposure to chemical inhalation - no serious findings on testing, but association of symptoms with exposure, and underlying history of asthma would warrant limiting exposure.  consider follow-up with [facility] if persistent symptoms after ceasing exposure.   Shortness of breath - secondary causes of shortness of breath were evaluated and no other frindings identified   Nonintractable headache, unspecified chronicity pattern, unspecified headache type       3/21/2019   [facility] EMERGENCY DEPARTMENT     Jacob Lopes MD  03/21/19 1949

## 2019-03-21 NOTE — DISCHARGE INSTRUCTIONS
ICD-10-CM    1. Exposure to chemical inhalation Z77.098     no serious findings on testing, but association of symptoms with exposure, and underlying history of asthma would warrant limiting exposure.  consider follow-up with [facility] if persistent symptoms after ceasing exposure.   2. Shortness of breath R06.02     secondary causes of shortness of breath were evaluated and no other frindings identified   3. Nonintractable headache, unspecified chronicity pattern, unspecified headache type R51

## 2019-03-21 NOTE — ED AVS SNAPSHOT
[facility]  [address] [address]  Phone:  [phone]  Fax:  [phone]                                    Sherley Quijano   MRN: [id number]    Department:  [facility]   Date of Visit:  3/21/2019           After Visit Summary Signature Page    I have received my discharge instructions, and my questions have been answered. I have discussed any challenges I see with this plan with the nurse or doctor.    ..........................................................................................................................................  Patient/Patient Representative Signature      ..........................................................................................................................................  Patient Representative Print Name and Relationship to Patient    ..................................................               ................................................  Date                                   Time    ..........................................................................................................................................  Reviewed by Signature/Title    ...................................................              ..............................................  Date                                               Time          22EPIC Rev 08/18

## 2019-05-13 ENCOUNTER — MYC MEDICAL ADVICE (OUTPATIENT)
Dept: FAMILY MEDICINE | Facility: CLINIC | Age: 29
End: 2019-05-13

## 2019-05-13 DIAGNOSIS — Z30.015 ENCOUNTER FOR INITIAL PRESCRIPTION OF VAGINAL RING HORMONAL CONTRACEPTIVE: Primary | ICD-10-CM

## 2019-05-14 RX ORDER — ETONOGESTREL AND ETHINYL ESTRADIOL VAGINAL RING .015; .12 MG/D; MG/D
1 RING VAGINAL
Qty: 3 EACH | Refills: 4 | Status: SHIPPED | OUTPATIENT
Start: 2019-05-14 | End: 2020-06-30

## 2020-01-13 ENCOUNTER — OFFICE VISIT (OUTPATIENT)
Dept: FAMILY MEDICINE | Facility: CLINIC | Age: 30
End: 2020-01-13
Payer: COMMERCIAL

## 2020-01-13 VITALS
BODY MASS INDEX: 28 KG/M2 | HEART RATE: 72 BPM | SYSTOLIC BLOOD PRESSURE: 106 MMHG | WEIGHT: 178.4 LBS | RESPIRATION RATE: 15 BRPM | TEMPERATURE: 97.8 F | DIASTOLIC BLOOD PRESSURE: 72 MMHG | HEIGHT: 67 IN

## 2020-01-13 DIAGNOSIS — J32.0 CHRONIC MAXILLARY SINUSITIS: ICD-10-CM

## 2020-01-13 DIAGNOSIS — H66.005 RECURRENT ACUTE SUPPURATIVE OTITIS MEDIA WITHOUT SPONTANEOUS RUPTURE OF LEFT TYMPANIC MEMBRANE: Primary | ICD-10-CM

## 2020-01-13 PROCEDURE — 99214 OFFICE O/P EST MOD 30 MIN: CPT | Performed by: FAMILY MEDICINE

## 2020-01-13 RX ORDER — CEFUROXIME AXETIL 250 MG/1
250 TABLET ORAL 2 TIMES DAILY
Qty: 42 TABLET | Refills: 0 | Status: SHIPPED | OUTPATIENT
Start: 2020-01-13 | End: 2020-01-31

## 2020-01-13 RX ORDER — CYCLOBENZAPRINE HCL 10 MG
10 TABLET ORAL 3 TIMES DAILY PRN
Qty: 30 TABLET | Refills: 0 | Status: SHIPPED | OUTPATIENT
Start: 2020-01-13 | End: 2022-01-27

## 2020-01-13 ASSESSMENT — MIFFLIN-ST. JEOR: SCORE: 1566.85

## 2020-01-13 NOTE — NURSING NOTE
"Chief Complaint   Patient presents with     Ear Problem       Initial /72 (BP Location: Right arm, Patient Position: Chair, Cuff Size: Adult Large)   Pulse 72   Temp 97.8  F (36.6  C) (Tympanic)   Resp 15   Ht 1.702 m (5' 7\")   Wt 80.9 kg (178 lb 6.4 oz)   BMI 27.94 kg/m   Estimated body mass index is 27.94 kg/m  as calculated from the following:    Height as of this encounter: 1.702 m (5' 7\").    Weight as of this encounter: 80.9 kg (178 lb 6.4 oz).    Patient presents to the clinic using No DME    Health Maintenance that is potentially due pending provider review:  Pap Smear    n/a    Is there anyone who you would like to be able to receive your results? No  If yes have patient fill out ELIZA    "

## 2020-01-13 NOTE — PROGRESS NOTES
"Subjective     Sherley Quijano is a 29 year old female who presents to clinic today for the following health issues:    HPI   ENT Symptoms             Symptoms: cc Present Absent Comment   Fever/Chills   x    Fatigue   x    Muscle Aches   x    Eye Irritation   x    Sneezing   x    Nasal [place]/Drg  x     Sinus Pressure/Pain  x     Loss of smell   x    Dental pain   x    Sore Throat  x     Swollen Glands  x     Ear Pain/Fullness  x  Bilateral ear ringing   Cough   xx    Wheeze   x    Chest Pain   x    Shortness of breath   x    Rash   x    Other         Symptom duration:  1 1/2 months   Symptom severity:  severe  7/10   Treatments tried:  nothing   Contacts:  none       Pt with long history of sinus and ear infections  She had tubes as a child   Has had issues with ETD as an adult and has chronic sinus congestion recurrent infections   She has been struggling with the above symptoms off and on for 6 weeks   She feels also like she has lost some hearing that is chronic           Reviewed and updated as needed this visit by Provider  Tobacco  Allergies  Meds  Problems  Med Hx  Surg Hx  Fam Hx         Review of Systems   ROS COMP: Constitutional, HEENT, cardiovascular, pulmonary, gi and gu systems are negative, except as otherwise noted.      Objective    /72 (BP Location: Right arm, Patient Position: Chair, Cuff Size: Adult Large)   Pulse 72   Temp 97.8  F (36.6  C) (Tympanic)   Resp 15   Ht 1.702 m (5' 7\")   Wt 80.9 kg (178 lb 6.4 oz)   BMI 27.94 kg/m    Body mass index is 27.94 kg/m .  Physical Exam   GENERAL APPEARANCE: healthy, alert and no distress  EYES: Eyes grossly normal to inspection, PERRL and conjunctivae and sclerae normal  HENT: nose green discharge and rhinitis and mouth without ulcers or lesions and TM congested/bulging left  NECK: no adenopathy, no asymmetry, masses, or scars and thyroid normal to palpation  RESP: lungs clear to auscultation - no rales, rhonchi or wheezes  CV: regular " "rates and rhythm, normal S1 S2, no S3 or S4 and no murmur, click or rub  PSYCH: mentation appears normal and affect normal/bright    Diagnostic Test Results:  Labs reviewed in Epic        Assessment & Plan     1. Recurrent acute suppurative otitis media without spontaneous rupture of left tympanic membrane  Chronic and recurrent  - cefuroxime (CEFTIN) 250 MG tablet; Take 1 tablet (250 mg) by mouth 2 times daily  Dispense: 42 tablet; Refill: 0  - OTOLARYNGOLOGY REFERRAL  - AUDIOLOGY ADULT REFERRAL    2. Chronic maxillary sinusitis  Chronic issues  Has not had allergy evaluation and that may need to be done as well   - OTOLARYNGOLOGY REFERRAL     BMI:   Estimated body mass index is 27.94 kg/m  as calculated from the following:    Height as of this encounter: 1.702 m (5' 7\").    Weight as of this encounter: 80.9 kg (178 lb 6.4 oz).   Weight management plan: Discussed healthy diet and exercise guidelines        Patient Instructions   Schedule a pap and physical with your provider.    Set up ENT and audiology     Antibiotic for sinus and recrrent Ear issues      Return in about 1 month (around 2/13/2020) for with ENT.      Risks, benefits, side effects and rationale for treatment plan fully discussed with the patient and understanding expressed.   Raquel Villalba MD  [facility]      "

## 2020-01-13 NOTE — LETTER
My Asthma Action Plan    Name: Sherley Quijano   YOB: 1990  Date: 1/13/2020   My doctor: Raquel Villalba MD   My clinic: [facility] [address]        My Rescue Medicine:   Albuterol inhaler (Proair/Ventolin/Proventil HFA)  2-4 puffs EVERY 4 HOURS as needed. Use a spacer if recommended by your provider.   My Asthma Severity:   Intermittent / Exercise Induced  Know your asthma triggers: exercise or sports and cold air             GREEN ZONE   Good Control    I feel good    No cough or wheeze    Can work, sleep and play without asthma symptoms       Take your asthma control medicine every day.     1. If exercise triggers your asthma, take your rescue medication    15 minutes before exercise or sports, and    During exercise if you have asthma symptoms  2. Spacer to use with inhaler: If you have a spacer, make sure to use it with your inhaler             YELLOW ZONE Getting Worse  I have ANY of these:    I do not feel good    Cough or wheeze    Chest feels tight    Wake up at night   1. Keep taking your Green Zone medications  2. Start taking your rescue medicine:    every 20 minutes for up to 1 hour. Then every 4 hours for 24-48 hours.  3. If you stay in the Yellow Zone for more than 12-24 hours, contact your doctor.  4. If you do not return to the Green Zone in 12-24 hours or you get worse, start taking your oral steroid medicine if prescribed by your provider.           RED ZONE Medical Alert - Get Help  I have ANY of these:    I feel awful    Medicine is not helping    Breathing getting harder    Trouble walking or talking    Nose opens wide to breathe       1. Take your rescue medicine NOW  2. If your provider has prescribed an oral steroid medicine, start taking it NOW  3. Call your doctor NOW  4. If you are still in the Red Zone after 20 minutes and you have not reached your doctor:    Take your rescue medicine again and    Call 911 or go to the emergency room right away    See your  regular doctor within 2 weeks of an Emergency Room or Urgent Care visit for follow-up treatment.          Annual Reminders:  Meet with Asthma Educator,  Flu Shot in the Fall, consider Pneumonia Vaccination for patients with asthma (aged 19 and older).    Pharmacy: [id number] ([place], [place]) [address]    Electronically signed by Raquel Villalba MD   Date: 01/13/20                    Asthma Triggers  How To Control Things That Make Your Asthma Worse    Triggers are things that make your asthma worse.  Look at the list below to help you find your triggers and   what you can do about them. You can help prevent asthma flare-ups by staying away from your triggers.      Trigger                                                          What you can do   Cigarette Smoke  Tobacco smoke can make asthma worse. Do not allow smoking in your home, car or around you.  Be sure no one smokes at a child s day care or school.  If you smoke, ask your health care provider for ways to help you quit.  Ask family members to quit too.  Ask your health care provider for a referral to Quit Plan to help you quit smoking, or call 9-622-541-PLAN.     Colds, Flu, Bronchitis  These are common triggers of asthma. Wash your hands often.  Don t touch your eyes, nose or mouth.  Get a flu shot every year.     Dust Mites  These are tiny bugs that live in cloth or carpet. They are too small to see. Wash sheets and blankets in hot water every week.   Encase pillows and mattress in dust mite proof covers.  Avoid having carpet if you can. If you have carpet, vacuum weekly.   Use a dust mask and HEPA vacuum.   Pollen and Outdoor Mold  Some people are allergic to trees, grass, or weed pollen, or molds. Try to keep your windows closed.  Limit time out doors when pollen count is high.   Ask you health care provider about taking medicine during allergy season.     Animal Dander  Some people are allergic to skin flakes, urine or saliva  from pets with fur or feathers. Keep pets with fur or feathers out of your home.    If you can t keep the pet outdoors, then keep the pet out of your bedroom.  Keep the bedroom door closed.  Keep pets off cloth furniture and away from stuffed toys.     Mice, Rats, and Cockroaches  Some people are allergic to the waste from these pests.   Cover food and garbage.  Clean up spills and food crumbs.  Store grease in the refrigerator.   Keep food out of the bedroom.   Indoor Mold  This can be a trigger if your home has high moisture. Fix leaking faucets, pipes, or other sources of water.   Clean moldy surfaces.  Dehumidify basement if it is damp and smelly.   Smoke, Strong Odors, and Sprays  These can reduce air quality. Stay away from strong odors and sprays, such as perfume, powder, hair spray, paints, smoke incense, paint, cleaning products, candles and new carpet.   Exercise or Sports  Some people with asthma have this trigger. Be active!  Ask your doctor about taking medicine before sports or exercise to prevent symptoms.    Warm up for 5-10 minutes before and after sports or exercise.     Other Triggers of Asthma  Cold air:  Cover your nose and mouth with a scarf.  Sometimes laughing or crying can be a trigger.  Some medicines and food can trigger asthma.

## 2020-01-13 NOTE — PATIENT INSTRUCTIONS
Schedule a pap and physical with your provider.    Set up ENT and audiology     Antibiotic for sinus and recrrent Ear issues

## 2020-01-14 ASSESSMENT — ASTHMA QUESTIONNAIRES: ACT_TOTALSCORE: 25

## 2020-01-22 NOTE — PROGRESS NOTES
AUDIOLOGY REPORT    SUMMARY: Audiology visit completed. See audiogram for results.      RECOMMENDATIONS: Follow-up with ENT.    Lois Santillan.  Clinical Audiologist, [place] #[id number]

## 2020-01-30 NOTE — PROGRESS NOTES
Chief Complaint   Patient presents with     Ear Problem     history of 3 or more ear infections yearly since age 2- had tubes x1/audio     History of Present Illness   Sherley Quijano is a 29 year old female who presents for nose and sinus evaluation. I am seeing this patient in consultation for recurrent acute separative otitis media without spontaneous rupture of the left tympanic membrane at the request of the provider Dr. Villalba.  The patient describes symptoms of bilateral ear fullness and bilateral hearing loss worse on the right-hand side.  The patient reports intermittent sense of otorrhea usually worse in the wintertime.  She gets some dry skin in her ear canals also.  The patient reports having episodes of dizziness daily that usually last seconds.  Often times or when her head is tilted up when she is working as an .  She is a significant noise exposure history as an .  She is also a firearm user, usually wears hearing protection.  She is a right-handed shooter.  The patient denies any otalgia.  She is not had previous ear surgery aside from ear tubes as a child.     Past Medical History  Patient Active Problem List   Diagnosis     Mild intermittent asthma     Dysfunction of eustachian tube     Attention deficit disorder     Bipolar affective disorder (H)     Adverse reaction to viral vaccines     CARDIOVASCULAR SCREENING; LDL GOAL LESS THAN 130     Migraine headache     Prenatal care, first pregnancy     SGA (small for gestational age)     Supervision of normal first pregnancy     IUGR (intrauterine growth restriction) affecting care of mother, third trimester, fetus 1     Vaginal delivery     Chronic seasonal allergic rhinitis due to pollen     Chronic allergic rhinitis due to animal hair and dander     Allergic rhinitis due to dust mite     Allergic conjunctivitis, bilateral     Current Medications     Current Outpatient Medications:      cyclobenzaprine (FLEXERIL) 10 MG tablet, Take  1 tablet (10 mg) by mouth 3 times daily as needed for muscle spasms, Disp: 30 tablet, Rfl: 0     etonogestrel-ethinyl estradiol (NUVARING) 0.12-0.015 MG/24HR vaginal ring, Place 1 each vaginally every 28 days, Disp: 3 each, Rfl: 4     albuterol (ACCUNEB) 1.25 MG/3ML nebulizer solution, Take 3 mLs by nebulization every 4 hours as needed for shortness of breath / dyspnea. (Patient not taking: Reported on 2020), Disp: 1 Box, Rfl: 3     EPINEPHrine (AUVI-Q) 0.3 MG/0.3ML injection 2-pack, Inject 0.3 mLs (0.3 mg) into the muscle as needed for anaphylaxis (Patient not taking: Reported on 2020), Disp: 0.6 mL, Rfl: 1     loratadine (CLARITIN) 10 MG tablet, Take 1 tablet (10 mg) by mouth daily as needed for allergies (Patient not taking: Reported on 2020), Disp: 30 tablet, Rfl: 3    Allergies  Allergies   Allergen Reactions     Kiwi Anaphylaxis     Throat swelling, difficulty breathing     Lanolin Hives     Latex Hives     Penicillins Rash       Social History   Social History     Socioeconomic History     Marital status:      Spouse name: Not on file     Number of children: 0     Years of education: 12.5+     Highest education level: Not on file   Occupational History     Occupation:      Employer: [employer]     Occupation: [employer]      Employer: [employer]   Social Needs     Financial resource strain: Not on file     Food insecurity:     Worry: Not on file     Inability: Not on file     Transportation needs:     Medical: Not on file     Non-medical: Not on file   Tobacco Use     Smoking status: Former Smoker     Packs/day: 0.25     Types: Cigarettes     Last attempt to quit: 10/2017     Years since quittin.3     Smokeless tobacco: Never Used     Tobacco comment: Needle using ecgis since 2018   Substance and Sexual Activity     Alcohol use: Yes     Alcohol/week: 0.0 standard drinks     Comment: rare     Drug use: No     Sexual activity: Yes     Partners: Male   Lifestyle      Physical activity:     Days per week: Not on file     Minutes per session: Not on file     Stress: Not on file   Relationships     Social connections:     Talks on phone: Not on file     Gets together: Not on file     Attends Sabianism service: Not on file     Active member of club or organization: Not on file     Attends meetings of clubs or organizations: Not on file     Relationship status: Not on file     Intimate partner violence:     Fear of current or ex partner: Not on file     Emotionally abused: Not on file     Physically abused: Not on file     Forced sexual activity: Not on file   Other Topics Concern     Parent/sibling w/ CABG, MI or angioplasty before 65F 55M? No   Social History Narrative    April 16, 2018    ENVIRONMENTAL HISTORY: [place] The home is heated with a wood stove and radiant heat. They do not have central air conditioning. The patient's bedroom is furnished with hard sánchez in bedroom, allergen mattress cover, allergen pillowcase cover, fabric window coverings and 2 rugs.  Pets inside the house include 1 cat. There is  history of cockroach or mice infestation. There is 1 smoker in the house.  The house does not have a damp basement.       Family History  Family History   Problem Relation Age of Onset     C.A.D. Maternal Grandfather         MI     Heart Disease Maternal Grandfather      Breast Cancer Maternal Grandmother      Depression Maternal Grandmother      Thyroid Disease Maternal Grandmother      Breast Cancer Paternal Grandmother      Alcohol/Drug Father         alcohol-recovered     Allergies Father      Allergies Sister      Neurologic Disorder Sister         Narcolepsy     Substance Abuse Sister         recovered drugs     Substance Abuse Mother         recovered alcohol     Depression Mother      Thyroid Disease Mother      Other - See Comments Other         Heart Attack       Review of Systems  As per HPI and PMHx,  otherwise 10+ comprehensive system review is negative.    Physical Exam  /83 (BP Location: Right arm, Patient Position: Chair, Cuff Size: Adult Regular)   Pulse 79   Temp 98.1  F (36.7  C) (Oral)   Wt 80.7 kg (178 lb)   BMI 27.88 kg/m    GENERAL: Patient is a pleasant, cooperative 29 year old female in no acute distress.  HEAD: Normocephalic, atraumatic.  Hair and scalp are normal.  EYES: Pupils are equal, round, reactive to light and accommodation.  Extraocular movements are intact.  The sclera nonicteric without injection.  The extraocular structures are normal.  EARS: Normal shape and symmetry.  No tenderness when palpating the mastoid or tragal areas bilaterally.  Ears were examined of the otic microscope.  Otoscopic exam reveals a minimal amount of dry cerumen.  The ear canals are definitely dry without obvious signs of psoriasis.   The bilateral tympanic membranes are round, intact without evidence of effusion, good landmarks.  No retraction, granulation, or drainage.  NOSE: Nares are patent.  Nasal mucosa is pink and moist.  Negative anterior rhinoscopy.  ORAL CAVITY: Dentition is in good repair.  Mucous membranes are moist.  The patient does have bilateral crepitus and late clicking in her jaw joints.  No wear patterns or signs of bruxism.   NEUROLOGIC: Cranial nerves II through XII are grossly intact.  Voice is strong.  Patient is House-Brackman I/VI bilaterally.  CARDIOVASCULAR: Extremities are warm and well-perfused.  No significant peripheral edema.  RESPIRATORY: Patient has nonlabored breathing without cough, wheeze, stridor.  PSYCHIATRIC: Patient is alert and oriented.  Mood and affect appear normal.  SKIN: Warm and dry.  No scalp, face, or neck lesions noted.    Audiogram  The patient underwent an audiogram performed today.  My review of the audiogram shows normal sloping to mild sloping to moderately severe sensorineural hearing loss in the right ear and normal sloping to moderate  sensorineural hearing loss sloping to normal hearing in the left ear..  Pure-tone average is 10 dB on the right and 15 dB on the left.  Speech reception threshhold is 15 dB on the right and 15 dB on the left.  The patient had 100% word recognition on the right and 100% word recognition on the left.  The patient had a type A shallow tympanogram on the right and a type A shallow tympanogram on the left.     Assessment and Plan     ICD-10-CM    1. Asymmetrical right sensorineural hearing loss H90.41 AUDIOLOGY ADULT REFERRAL     Microscopy, Binocular     MR Brain w/o & w Contrast     NEUROLOGY ADULT REFERRAL   2. Sensorineural hearing loss, bilateral H90.3 AUDIOLOGY ADULT REFERRAL     Microscopy, Binocular     MR Brain w/o & w Contrast     NEUROLOGY ADULT REFERRAL   3. Dizziness R42 AUDIOLOGY ADULT REFERRAL     Microscopy, Binocular     MR Brain w/o & w Contrast     NEUROLOGY ADULT REFERRAL   4. History of migraine headaches Z86.69 AUDIOLOGY ADULT REFERRAL     Microscopy, Binocular     MR Brain w/o & w Contrast     NEUROLOGY ADULT REFERRAL   5. Daily headache R51 AUDIOLOGY ADULT REFERRAL     Microscopy, Binocular     MR Brain w/o & w Contrast     NEUROLOGY ADULT REFERRAL   6. 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 Ear fullness, bilateral H93.8X3 AUDIOLOGY ADULT REFERRAL     Microscopy, Binocular     MR Brain w/o & w Contrast     NEUROLOGY ADULT REFERRAL     It was my pleasure seeing Sherley Quijano today in clinic.  The patient presents with asymmetric hearing loss with the right ear being worse.  She is had ear problems all of her life.  Given the asymmetric sensorineural loss, I think that an MRI would be appropriate.     The patient also reports daily headache, dizziness, and ear fullness.  She has some obvious signs of temporomandibular joint dysfunction on physical exam but also has a history of  migraine headache.  I think I would recommend referral to neurology for evaluation of the headache portion of her symptoms.  We discussed conservative measures for temporomandibular joint dysfunction.  The patient, towards the tail end of our visit had concerns about her nose and adenoids.  I will have the patient return to see me in a couple weeks for repeat evaluation.  We can review her MRI imaging at that time and do a more in-depth discussion of her nose and sinuses.     Suhas Sweeney MD  Department of Otolarygology-Head and Neck Surgery  [facility]

## 2020-01-31 ENCOUNTER — OFFICE VISIT (OUTPATIENT)
Dept: AUDIOLOGY | Facility: CLINIC | Age: 30
End: 2020-01-31
Payer: COMMERCIAL

## 2020-01-31 ENCOUNTER — OFFICE VISIT (OUTPATIENT)
Dept: OTOLARYNGOLOGY | Facility: CLINIC | Age: 30
End: 2020-01-31
Payer: COMMERCIAL

## 2020-01-31 VITALS
WEIGHT: 178 LBS | SYSTOLIC BLOOD PRESSURE: 134 MMHG | TEMPERATURE: 98.1 F | BODY MASS INDEX: 27.88 KG/M2 | DIASTOLIC BLOOD PRESSURE: 83 MMHG | HEART RATE: 79 BPM

## 2020-01-31 DIAGNOSIS — R51.9 DAILY HEADACHE: ICD-10-CM

## 2020-01-31 DIAGNOSIS — H90.3 SENSORINEURAL HEARING LOSS, BILATERAL: ICD-10-CM

## 2020-01-31 DIAGNOSIS — H93.8X3 EAR FULLNESS, BILATERAL: ICD-10-CM

## 2020-01-31 DIAGNOSIS — H90.3 SENSORINEURAL HEARING LOSS (SNHL) OF BOTH EARS: Primary | ICD-10-CM

## 2020-01-31 DIAGNOSIS — Z86.69 HISTORY OF MIGRAINE HEADACHES: ICD-10-CM

## 2020-01-31 DIAGNOSIS — M26.609 TEMPORAL MANDIBULAR JOINT DISORDER: ICD-10-CM

## 2020-01-31 DIAGNOSIS — R42 DIZZINESS: ICD-10-CM

## 2020-01-31 PROCEDURE — 92504 EAR MICROSCOPY EXAMINATION: CPT | Performed by: OTOLARYNGOLOGY

## 2020-01-31 PROCEDURE — 99244 OFF/OP CNSLTJ NEW/EST MOD 40: CPT | Mod: 25 | Performed by: OTOLARYNGOLOGY

## 2020-01-31 PROCEDURE — 92567 TYMPANOMETRY: CPT | Performed by: AUDIOLOGIST

## 2020-01-31 PROCEDURE — 99207 ZZC NO CHARGE LOS: CPT | Performed by: AUDIOLOGIST

## 2020-01-31 PROCEDURE — 92557 COMPREHENSIVE HEARING TEST: CPT | Performed by: AUDIOLOGIST

## 2020-01-31 ASSESSMENT — PAIN SCALES - GENERAL: PAINLEVEL: NO PAIN (0)

## 2020-01-31 NOTE — PATIENT INSTRUCTIONS
Per physician's instructions    Temporal mandibular joint dysfunction instructions    For the next 2 weeks:  1. Soft diet  2. No chewing gum  3. Use ibuprofen 600mg every 6 hours for 2 weeks  4. Use a bite guard at night, consider Balbir's Grind-No-More which is available OTC. May also discuss with dentist to make a oral appliance.     TMJ exercises:  1. Close your mouth to touch your upper and lower teeth without clenching them. Rest the tip of your tongue on your palate behind your upper teeth.  2. Slide the tip of your tongue backwards toward the throat as far as you can, keeping your teeth together.  3. Try to touch the soft palate with the tip of your tongue, and keep it there. Then slowly open your mouth until you feel your tongue is being pulled away from the soft palate. Keep your mouth open in this position for five seconds before closing your mouth to relax.  4. Repeat the above steps slowly for 5 minutes. You may want to check in a mirror to be sure your teeth are closed aligned straight up and down, without your jaw being off to one side.     Do the exercises twice daily for the first 2 weeks, but then you can do it more often if it seems to help. You shouldn't hear any clicks or noise from your joints, and if you do then you should restart the exercise rather than opening the mouth further. With practice, you will be able to open further without having clicking.

## 2020-01-31 NOTE — NURSING NOTE
"Initial /83 (BP Location: Right arm, Patient Position: Chair, Cuff Size: Adult Regular)   Pulse 79   Temp 98.1  F (36.7  C) (Oral)   Wt 80.7 kg (178 lb)   BMI 27.88 kg/m   Estimated body mass index is 27.88 kg/m  as calculated from the following:    Height as of 1/13/20: 1.702 m (5' 7\").    Weight as of this encounter: 80.7 kg (178 lb). .    Manda Chavez LPN    "

## 2020-01-31 NOTE — LETTER
1/31/2020         RE: Sherley Quijano  [address] [place] [id number]        Dear Colleague,    Thank you for referring your patient, Sherley Quijano, to the [facility]. Please see a copy of my visit note below.    Chief Complaint   Patient presents with     Ear Problem     history of 3 or more ear infections yearly since age 2- had tubes x1/audio     History of Present Illness   Sherley Quijano is a 29 year old female who presents for nose and sinus evaluation. I am seeing this patient in consultation for recurrent acute separative otitis media without spontaneous rupture of the left tympanic membrane at the request of the provider Dr. Villalba.  The patient describes symptoms of bilateral ear fullness and bilateral hearing loss worse on the right-hand side.  The patient reports intermittent sense of otorrhea usually worse in the wintertime.  She gets some dry skin in her ear canals also.  The patient reports having episodes of dizziness daily that usually last seconds.  Often times or when her head is tilted up when she is working as an .  She is a significant noise exposure history as an .  She is also a firearm user, usually wears hearing protection.  She is a right-handed shooter.  The patient denies any otalgia.  She is not had previous ear surgery aside from ear tubes as a child.     Past Medical History  Patient Active Problem List   Diagnosis     Mild intermittent asthma     Dysfunction of eustachian tube     Attention deficit disorder     Bipolar affective disorder (H)     Adverse reaction to viral vaccines     CARDIOVASCULAR SCREENING; LDL GOAL LESS THAN 130     Migraine headache     Prenatal care, first pregnancy     SGA (small for gestational age)     Supervision of normal first pregnancy     IUGR (intrauterine growth restriction) affecting care of mother, third trimester, fetus 1     Vaginal delivery     Chronic seasonal allergic rhinitis due to pollen      Chronic allergic rhinitis due to animal hair and dander     Allergic rhinitis due to dust mite     Allergic conjunctivitis, bilateral     Current Medications     Current Outpatient Medications:      cyclobenzaprine (FLEXERIL) 10 MG tablet, Take 1 tablet (10 mg) by mouth 3 times daily as needed for muscle spasms, Disp: 30 tablet, Rfl: 0     etonogestrel-ethinyl estradiol (NUVARING) 0.12-0.015 MG/24HR vaginal ring, Place 1 each vaginally every 28 days, Disp: 3 each, Rfl: 4     albuterol (ACCUNEB) 1.25 MG/3ML nebulizer solution, Take 3 mLs by nebulization every 4 hours as needed for shortness of breath / dyspnea. (Patient not taking: Reported on 2020), Disp: 1 Box, Rfl: 3     EPINEPHrine (AUVI-Q) 0.3 MG/0.3ML injection 2-pack, Inject 0.3 mLs (0.3 mg) into the muscle as needed for anaphylaxis (Patient not taking: Reported on 2020), Disp: 0.6 mL, Rfl: 1     loratadine (CLARITIN) 10 MG tablet, Take 1 tablet (10 mg) by mouth daily as needed for allergies (Patient not taking: Reported on 2020), Disp: 30 tablet, Rfl: 3    Allergies  Allergies   Allergen Reactions     Kiwi Anaphylaxis     Throat swelling, difficulty breathing     Lanolin Hives     Latex Hives     Penicillins Rash       Social History   Social History     Socioeconomic History     Marital status:      Spouse name: Not on file     Number of children: 0     Years of education: 12.5+     Highest education level: Not on file   Occupational History     Occupation:      Employer: [employer]     Occupation: [employer]      Employer: [place]   Social Needs     Financial resource strain: Not on file     Food insecurity:     Worry: Not on file     Inability: Not on file     Transportation needs:     Medical: Not on file     Non-medical: Not on file   Tobacco Use     Smoking status: Former Smoker     Packs/day: 0.25     Types: Cigarettes     Last attempt to quit: 10/2017     Years since quittin.3     Smokeless tobacco: Never  Used     Tobacco comment: Needle using ecgis since october 2018   Substance and Sexual Activity     Alcohol use: Yes     Alcohol/week: 0.0 standard drinks     Comment: rare     Drug use: No     Sexual activity: Yes     Partners: Male   Lifestyle     Physical activity:     Days per week: Not on file     Minutes per session: Not on file     Stress: Not on file   Relationships     Social connections:     Talks on phone: Not on file     Gets together: Not on file     Attends Orthodox service: Not on file     Active member of club or organization: Not on file     Attends meetings of clubs or organizations: Not on file     Relationship status: Not on file     Intimate partner violence:     Fear of current or ex partner: Not on file     Emotionally abused: Not on file     Physically abused: Not on file     Forced sexual activity: Not on file   Other Topics Concern     Parent/sibling w/ CABG, MI or angioplasty before 65F 55M? No   Social History Narrative    April 16, 2018    ENVIRONMENTAL HISTORY: [place] The home is heated with a wood stove and radiant heat. They do not have central air conditioning. The patient's bedroom is furnished with hard sánchez in bedroom, allergen mattress cover, allergen pillowcase cover, fabric window coverings and 2 rugs.  Pets inside the house include 1 cat. There is  history of cockroach or mice infestation. There is 1 smoker in the house.  The house does not have a damp basement.       Family History  Family History   Problem Relation Age of Onset     C.A.D. Maternal Grandfather         MI     Heart Disease Maternal Grandfather      Breast Cancer Maternal Grandmother      Depression Maternal Grandmother      Thyroid Disease Maternal Grandmother      Breast Cancer Paternal Grandmother      Alcohol/Drug Father         alcohol-recovered     Allergies Father      Allergies Sister      Neurologic Disorder Sister         Narcolepsy      Substance Abuse Sister         recovered drugs     Substance Abuse Mother         recovered alcohol     Depression Mother      Thyroid Disease Mother      Other - See Comments Other         Heart Attack       Review of Systems  As per HPI and PMHx, otherwise 10+ comprehensive system review is negative.    Physical Exam  /83 (BP Location: Right arm, Patient Position: Chair, Cuff Size: Adult Regular)   Pulse 79   Temp 98.1  F (36.7  C) (Oral)   Wt 80.7 kg (178 lb)   BMI 27.88 kg/m     GENERAL: Patient is a pleasant, cooperative 29 year old female in no acute distress.  HEAD: Normocephalic, atraumatic.  Hair and scalp are normal.  EYES: Pupils are equal, round, reactive to light and accommodation.  Extraocular movements are intact.  The sclera nonicteric without injection.  The extraocular structures are normal.  EARS: Normal shape and symmetry.  No tenderness when palpating the mastoid or tragal areas bilaterally.  Ears were examined of the otic microscope.  Otoscopic exam reveals a minimal amount of dry cerumen.  The ear canals are definitely dry without obvious signs of psoriasis.   The bilateral tympanic membranes are round, intact without evidence of effusion, good landmarks.  No retraction, granulation, or drainage.  NOSE: Nares are patent.  Nasal mucosa is pink and moist.  Negative anterior rhinoscopy.  ORAL CAVITY: Dentition is in good repair.  Mucous membranes are moist.  The patient does have bilateral crepitus and late clicking in her jaw joints.  No wear patterns or signs of bruxism.   NEUROLOGIC: Cranial nerves II through XII are grossly intact.  Voice is strong.  Patient is House-Brackman I/VI bilaterally.  CARDIOVASCULAR: Extremities are warm and well-perfused.  No significant peripheral edema.  RESPIRATORY: Patient has nonlabored breathing without cough, wheeze, stridor.  PSYCHIATRIC: Patient is alert and oriented.  Mood and affect appear normal.  SKIN: Warm and dry.  No scalp, face, or neck  lesions noted.    Audiogram  The patient underwent an audiogram performed today.  My review of the audiogram shows normal sloping to mild sloping to moderately severe sensorineural hearing loss in the right ear and normal sloping to moderate sensorineural hearing loss sloping to normal hearing in the left ear..  Pure-tone average is 10 dB on the right and 15 dB on the left.  Speech reception threshhold is 15 dB on the right and 15 dB on the left.  The patient had 100% word recognition on the right and 100% word recognition on the left.  The patient had a type A shallow tympanogram on the right and a type A shallow tympanogram on the left.     Assessment and Plan     ICD-10-CM    1. Asymmetrical right sensorineural hearing loss H90.41 AUDIOLOGY ADULT REFERRAL     Microscopy, Binocular     MR Brain w/o & w Contrast     NEUROLOGY ADULT REFERRAL   2. Sensorineural hearing loss, bilateral H90.3 AUDIOLOGY ADULT REFERRAL     Microscopy, Binocular     MR Brain w/o & w Contrast     NEUROLOGY ADULT REFERRAL   3. Dizziness R42 AUDIOLOGY ADULT REFERRAL     Microscopy, Binocular     MR Brain w/o & w Contrast     NEUROLOGY ADULT REFERRAL   4. History of migraine headaches Z86.69 AUDIOLOGY ADULT REFERRAL     Microscopy, Binocular     MR Brain w/o & w Contrast     NEUROLOGY ADULT REFERRAL   5. Daily headache R51 AUDIOLOGY ADULT REFERRAL     Microscopy, Binocular     MR Brain w/o & w Contrast     NEUROLOGY ADULT REFERRAL   6. 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 Ear fullness, bilateral H93.8X3 AUDIOLOGY ADULT REFERRAL     Microscopy, Binocular     MR Brain w/o & w Contrast     NEUROLOGY ADULT REFERRAL     It was my pleasure seeing Sherley Quijano today in clinic.  The patient presents with asymmetric hearing loss with the right ear being worse.  She is had ear problems all of her life.  Given the asymmetric  sensorineural loss, I think that an MRI would be appropriate.     The patient also reports daily headache, dizziness, and ear fullness.  She has some obvious signs of temporomandibular joint dysfunction on physical exam but also has a history of migraine headache.  I think I would recommend referral to neurology for evaluation of the headache portion of her symptoms.  We discussed conservative measures for temporomandibular joint dysfunction.  The patient, towards the tail end of our visit had concerns about her nose and adenoids.  I will have the patient return to see me in a couple weeks for repeat evaluation.  We can review her MRI imaging at that time and do a more in-depth discussion of her nose and sinuses.     Suhas Sweeney MD  Department of Otolarygology-Head and Neck Surgery  [facility]     Again, thank you for allowing me to participate in the care of your patient.        Sincerely,        Suhas Sweeney MD

## 2020-02-10 ENCOUNTER — HEALTH MAINTENANCE LETTER (OUTPATIENT)
Age: 30
End: 2020-02-10

## 2020-02-17 ENCOUNTER — HOSPITAL ENCOUNTER (OUTPATIENT)
Dept: GENERAL RADIOLOGY | Facility: CLINIC | Age: 30
End: 2020-02-17
Attending: OTOLARYNGOLOGY
Payer: COMMERCIAL

## 2020-02-17 ENCOUNTER — HOSPITAL ENCOUNTER (OUTPATIENT)
Dept: MRI IMAGING | Facility: CLINIC | Age: 30
Discharge: HOME | End: 2020-02-17
Attending: OTOLARYNGOLOGY | Admitting: OTOLARYNGOLOGY
Payer: COMMERCIAL

## 2020-02-17 DIAGNOSIS — Z86.69 HISTORY OF MIGRAINE HEADACHES: ICD-10-CM

## 2020-02-17 DIAGNOSIS — M26.609 TEMPORAL MANDIBULAR JOINT DISORDER: ICD-10-CM

## 2020-02-17 DIAGNOSIS — R42 DIZZINESS: ICD-10-CM

## 2020-02-17 DIAGNOSIS — H93.8X3 EAR FULLNESS, BILATERAL: ICD-10-CM

## 2020-02-17 DIAGNOSIS — R51.9 DAILY HEADACHE: ICD-10-CM

## 2020-02-17 DIAGNOSIS — Z01.89 ENCOUNTER FOR IMAGING TO SCREEN FOR METAL PRIOR TO MAGNETIC RESONANCE IMAGING (MRI): ICD-10-CM

## 2020-02-17 DIAGNOSIS — H90.3 SENSORINEURAL HEARING LOSS, BILATERAL: ICD-10-CM

## 2020-02-17 PROCEDURE — 70030 X-RAY EYE FOR FOREIGN BODY: CPT

## 2020-02-17 PROCEDURE — 25500064 ZZH RX 255 OP 636: Performed by: OTOLARYNGOLOGY

## 2020-02-17 PROCEDURE — 70553 MRI BRAIN STEM W/O & W/DYE: CPT

## 2020-02-17 PROCEDURE — A9585 GADOBUTROL INJECTION: HCPCS | Performed by: OTOLARYNGOLOGY

## 2020-02-17 RX ORDER — GADOBUTROL 604.72 MG/ML
8 INJECTION INTRAVENOUS ONCE
Status: COMPLETED | OUTPATIENT
Start: 2020-02-17 | End: 2020-02-17

## 2020-02-17 RX ADMIN — GADOBUTROL 8 ML: 604.72 INJECTION INTRAVENOUS at 15:48

## 2020-02-20 NOTE — PROGRESS NOTES
Chief Complaint   Patient presents with     Results     go over imaging results     History of Present Illness  Sherley Quijano is a 29 year old female who returns today for follow-up.  I evaluated the patient on 1/31/2020.  The patient had asymmetric hearing loss in the right ear.  The patient underwent an audiogram on 1/31/2020.  My review of the audiogram showed normal sloping to mild sloping to moderately severe sensorineural hearing loss in the right ear and normal sloping to moderate sensorineural hearing loss sloping to normal hearing in the left ear..  Pure-tone average was 10 dB on the right and 15 dB on the left.  Speech reception threshhold was 15 dB on the right and 15 dB on the left.  The patient had 100% word recognition on the right and 100% word recognition on the left.  The patient had a type A shallow tympanogram on the right and a type A shallow tympanogram on the left.  We decided for an MRI with IAC protocol due to her asymmetric hearing loss.  During her visit we also briefly touched on nose and sinus concerns.  She returns today to review her MRI imaging and discuss her nose and sinus symptoms.    My review of the patient's MRI from 2/17/2020 shows normal inner ear and brainstem without any signs of retrocochlear pathology.  The middle ears mastoids were clear.  Of note, the patient did have sinus mucosal thickening in the bilateral frontal sinuses, bilateral maxillary sinuses, scattered thickening in the ethmoids, and mucosal thickening in the left sphenoid sinus.    From an ear symptom standpoint, the patient was describing bilateral ear fullness and hearing loss that was worse on the right-hand side.  Is also noting sensation of intermittent otorrhea usually worse in the wintertime with associated skin in her ear canals.  The patient reports having episodes of dizziness daily that usually last seconds.  Often times or when her head is tilted up when she is working as an .  She is a  significant noise exposure history as an .  She is also a firearm user, usually wears hearing protection.  She is a right-handed shooter.  The patient denies any otalgia.  She is not had previous ear surgery aside from ear tubes as a child.  Her exam did note some obvious temporomandibular joint dysfunction.  She does have a history of migraine headache.      From a nose and sinus standpoint, the patient reports bilateral nasal obstruction, nasal congestion, postnasal drainage, decrease sense of smell.  She denies any significant rhinorrhea or taste or smell disturbance.  No previous nose or sinus surgery.  She has significant history of allergies with multiple positives on allergy testing.  She has not tolerated allergy injection immunotherapy in the past.  She is not currently using any medications in her nose.  She does take Claritin-D occasionally but does not routinely take antihistamines.  She is not on Singulair.    Past Medical History  Patient Active Problem List   Diagnosis     Mild intermittent asthma     Dysfunction of eustachian tube     Attention deficit disorder     Bipolar affective disorder (H)     Adverse reaction to viral vaccines     CARDIOVASCULAR SCREENING; LDL GOAL LESS THAN 130     Migraine headache     Prenatal care, first pregnancy     SGA (small for gestational age)     Supervision of normal first pregnancy     IUGR (intrauterine growth restriction) affecting care of mother, third trimester, fetus 1     Vaginal delivery     Chronic seasonal allergic rhinitis due to pollen     Chronic allergic rhinitis due to animal hair and dander     Allergic rhinitis due to dust mite     Allergic conjunctivitis, bilateral     Current Medications    Current Outpatient Medications:      budesonide 0.5 MG/2ML IN neb solution, Place 0.5 mg/2 mL budesonide vial in 8 oz normal saline sinus rinse bottle.  Irrigate each nostril with one half of the bottle twice daily., Disp: 360 mL, Rfl: 3      cetirizine 10 MG PO tablet, Take 1 tablet (10 mg) by mouth daily, Disp: 90 tablet, Rfl: 3     cyclobenzaprine (FLEXERIL) 10 MG tablet, Take 1 tablet (10 mg) by mouth 3 times daily as needed for muscle spasms, Disp: 30 tablet, Rfl: 0     etonogestrel-ethinyl estradiol (NUVARING) 0.12-0.015 MG/24HR vaginal ring, Place 1 each vaginally every 28 days, Disp: 3 each, Rfl: 4     montelukast 10 MG PO tablet, Take 1 tablet (10 mg) by mouth At Bedtime, Disp: 90 tablet, Rfl: 3     albuterol (ACCUNEB) 1.25 MG/3ML nebulizer solution, Take 3 mLs by nebulization every 4 hours as needed for shortness of breath / dyspnea. (Patient not taking: Reported on 2/21/2020), Disp: 1 Box, Rfl: 3     EPINEPHrine (AUVI-Q) 0.3 MG/0.3ML injection 2-pack, Inject 0.3 mLs (0.3 mg) into the muscle as needed for anaphylaxis (Patient not taking: Reported on 2/21/2020), Disp: 0.6 mL, Rfl: 1     loratadine (CLARITIN) 10 MG tablet, Take 1 tablet (10 mg) by mouth daily as needed for allergies (Patient not taking: Reported on 2/21/2020), Disp: 30 tablet, Rfl: 3    Current Facility-Administered Medications:      triamcinolone (KENALOG-40) injection 80 mg, 80 mg, Intramuscular, Once, Suhas Sweeney MD    Allergies  Allergies   Allergen Reactions     Kiwi Anaphylaxis     Throat swelling, difficulty breathing     Lanolin Hives     Latex Hives     Penicillins Rash       Social History  Social History     Socioeconomic History     Marital status:      Spouse name: Not on file     Number of children: 0     Years of education: 12.5+     Highest education level: Not on file   Occupational History     Occupation:      Employer: [employer]     Occupation: [employer]      Employer: [employer]   Social Needs     Financial resource strain: Not on file     Food insecurity:     Worry: Not on file     Inability: Not on file     Transportation needs:     Medical: Not on file     Non-medical: Not on file   Tobacco Use     Smoking status: Former Smoker      Packs/day: 0.25     Types: Cigarettes     Last attempt to quit: 10/2017     Years since quittin.3     Smokeless tobacco: Never Used     Tobacco comment: using e-cig daily   Substance and Sexual Activity     Alcohol use: Yes     Alcohol/week: 0.0 standard drinks     Comment: rare     Drug use: No     Sexual activity: Yes     Partners: Male   Lifestyle     Physical activity:     Days per week: Not on file     Minutes per session: Not on file     Stress: Not on file   Relationships     Social connections:     Talks on phone: Not on file     Gets together: Not on file     Attends Restorationism service: Not on file     Active member of club or organization: Not on file     Attends meetings of clubs or organizations: Not on file     Relationship status: Not on file     Intimate partner violence:     Fear of current or ex partner: Not on file     Emotionally abused: Not on file     Physically abused: Not on file     Forced sexual activity: Not on file   Other Topics Concern     Parent/sibling w/ CABG, MI or angioplasty before 65F 55M? No   Social History Narrative    2018    ENVIRONMENTAL HISTORY: [place] The home is heated with a wood stove and radiant heat. They do not have central air conditioning. The patient's bedroom is furnished with hard sánchez in bedroom, allergen mattress cover, allergen pillowcase cover, fabric window coverings and 2 rugs.  Pets inside the house include 1 cat. There is  history of cockroach or mice infestation. There is 1 smoker in the house.  The house does not have a damp basement.       Family History  Family History   Problem Relation Age of Onset     C.A.D. Maternal Grandfather         MI     Heart Disease Maternal Grandfather      Breast Cancer Maternal Grandmother      Depression Maternal Grandmother      Thyroid Disease Maternal Grandmother      Breast Cancer Paternal Grandmother      Alcohol/Drug Father          alcohol-recovered     Allergies Father      Allergies Sister      Neurologic Disorder Sister         Narcolepsy     Substance Abuse Sister         recovered drugs     Substance Abuse Mother         recovered alcohol     Depression Mother      Thyroid Disease Mother      Other - See Comments Other         Heart Attack       Review of Systems  As per HPI and PMHx, otherwise 10 system review including the head and neck, constitutional, eyes, respiratory, GI, skin, neurologic, lymphatic, endocrine, and allergy systems is negative.    Physical Exam  /66 (BP Location: Right arm, Patient Position: Chair, Cuff Size: Adult Regular)   Pulse 87   Temp 98.4  F (36.9  C) (Oral)   Wt 80.7 kg (178 lb)   BMI 27.88 kg/m    GENERAL: Patient is a pleasant, cooperative 29 year old female in no acute distress.  HEAD: Normocephalic, atraumatic.  Hair and scalp are normal.  EYES: Pupils are equal, round, reactive to light and accommodation.  Extraocular movements are intact.  The sclera nonicteric without injection.  The extraocular structures are normal.  EARS: Normal shape and symmetry.  No tenderness when palpating the mastoid or tragal areas bilaterally.  Otoscopic exam reveals a minimal amount of cerumen bilaterally.  The bilateral tympanic membranes are round, intact without evidence of effusion, good landmarks.  No retraction, granulation, or drainage.  NOSE: Nares are patent.  Nasal mucosa is boggy and inflamed with sticky, inflammatory mucus.  Nasal septum is midline.  The inferior turbinates are moderately hypertrophied.  No nasal cavity masses, polyps, or mucopurulence on anterior rhinoscopy.  NEUROLOGIC: Cranial nerves II through XII are grossly intact.  Voice is strong.  Patient is House-Brackman I/VI bilaterally.  CARDIOVASCULAR: Extremities are warm and well-perfused.  No significant peripheral edema.  RESPIRATORY: Patient has nonlabored breathing without cough, wheeze, stridor.  PSYCHIATRIC: Patient is alert and  oriented.  Mood and affect appear normal.  SKIN: Warm and dry.  No scalp, face, or neck lesions noted.    Assessment and Plan     ICD-10-CM    1. Chronic pansinusitis J32.4 triamcinolone (KENALOG-40) injection 80 mg     budesonide 0.5 MG/2ML IN neb solution     cetirizine 10 MG PO tablet     montelukast 10 MG PO tablet   2. Asymmetrical right sensorineural hearing loss H90.41 triamcinolone (KENALOG-40) injection 80 mg     budesonide 0.5 MG/2ML IN neb solution     cetirizine 10 MG PO tablet     montelukast 10 MG PO tablet   3. Sensorineural hearing loss, bilateral H90.3 triamcinolone (KENALOG-40) injection 80 mg     budesonide 0.5 MG/2ML IN neb solution     cetirizine 10 MG PO tablet     montelukast 10 MG PO tablet   4. History of migraine headaches Z86.69 triamcinolone (KENALOG-40) injection 80 mg     budesonide 0.5 MG/2ML IN neb solution     cetirizine 10 MG PO tablet     montelukast 10 MG PO tablet   5. Daily headache R51 triamcinolone (KENALOG-40) injection 80 mg     budesonide 0.5 MG/2ML IN neb solution     cetirizine 10 MG PO tablet     montelukast 10 MG PO tablet      It was my pleasure seeing Sherley Quijano today in clinic.  From an ear standpoint, her MRI was negative for any retrocochlear pathology.  Her hearing loss and tinnitus are likely related to noise exposure.  We discussed protecting her hearing in noisy environments.  We discussed repeating an audiogram in 1 to 3 years or sooner if she notices significant hearing change.  She would not be a great candidate for hearing aid at this time but could try hearing aids in the future if she is having issues.    The patient does have a history of headache and migraine headache.  She has an appointment to see neurology in April.  She will keep that appointment.    From a nose and sinus standpoint, the patient did have some significant sinus inflammation on her MRI.  This is likely related to chronic sinusitis.  We discussed an aggressive nasal regimen including  twice daily budesonide irrigations.  The patient would benefit from an 88 mg injection of Kenalog today in clinic. We discussed the risks, benefits, options, goals of a intramuscular Kenalog injection today in office including, but not limited to: Risk of pain at injection site, risk of infection, side effects of systemic steroids including blood pressure problems, insulin resistance, weight gain, bone/joint issues, mood alteration.  Patient voiced understanding and is willing to proceed.    I will have the patient take 10 mg of Zyrtec daily in the morning.  We discussed taking this for 1 to 2 weeks to help decrease the daytime tiredness.  Usually the medication side effect decreases with time.  We discussed taking Singulair at bedtime.  The patient knows to contact me if budesonide medications too expensive.    The patient is transitioning insurances currently.  She knows to contact me if she is not improving with medical management.  If is not improving, we would consider a CT scan of her sinuses.    Suhas Sweeney MD  Department of Otolarygology-Head and Neck Surgery  [facility]

## 2020-02-21 ENCOUNTER — OFFICE VISIT (OUTPATIENT)
Dept: OTOLARYNGOLOGY | Facility: CLINIC | Age: 30
End: 2020-02-21
Payer: COMMERCIAL

## 2020-02-21 VITALS
DIASTOLIC BLOOD PRESSURE: 66 MMHG | BODY MASS INDEX: 27.88 KG/M2 | TEMPERATURE: 98.4 F | WEIGHT: 178 LBS | HEART RATE: 87 BPM | SYSTOLIC BLOOD PRESSURE: 113 MMHG

## 2020-02-21 DIAGNOSIS — J32.4 CHRONIC PANSINUSITIS: Primary | ICD-10-CM

## 2020-02-21 DIAGNOSIS — R51.9 DAILY HEADACHE: ICD-10-CM

## 2020-02-21 DIAGNOSIS — H90.3 SENSORINEURAL HEARING LOSS, BILATERAL: ICD-10-CM

## 2020-02-21 DIAGNOSIS — Z86.69 HISTORY OF MIGRAINE HEADACHES: ICD-10-CM

## 2020-02-21 PROCEDURE — 99214 OFFICE O/P EST MOD 30 MIN: CPT | Performed by: OTOLARYNGOLOGY

## 2020-02-21 RX ORDER — BUDESONIDE 0.5 MG/2ML
INHALANT ORAL
Qty: 360 ML | Refills: 3 | Status: SHIPPED | OUTPATIENT
Start: 2020-02-21 | End: 2021-11-02

## 2020-02-21 RX ORDER — CETIRIZINE HYDROCHLORIDE 10 MG/1
10 TABLET ORAL DAILY
Qty: 90 TABLET | Refills: 3 | Status: SHIPPED | OUTPATIENT
Start: 2020-02-21 | End: 2020-12-21

## 2020-02-21 RX ORDER — TRIAMCINOLONE ACETONIDE 40 MG/ML
80 INJECTION, SUSPENSION INTRA-ARTICULAR; INTRAMUSCULAR ONCE
Status: COMPLETED | OUTPATIENT
Start: 2020-02-21 | End: 2020-02-21

## 2020-02-21 RX ORDER — MONTELUKAST SODIUM 10 MG/1
10 TABLET ORAL AT BEDTIME
Qty: 90 TABLET | Refills: 3 | Status: SHIPPED | OUTPATIENT
Start: 2020-02-21 | End: 2020-12-21

## 2020-02-21 RX ADMIN — TRIAMCINOLONE ACETONIDE 80 MG: 40 INJECTION, SUSPENSION INTRA-ARTICULAR; INTRAMUSCULAR at 17:52

## 2020-02-21 ASSESSMENT — PAIN SCALES - GENERAL: PAINLEVEL: NO PAIN (0)

## 2020-02-21 NOTE — LETTER
2/21/2020         RE: Sherley Quijano  [address] [place] [id number]        Dear Colleague,    Thank you for referring your patient, Sherley Quijano, to the [facility]. Please see a copy of my visit note below.    Chief Complaint   Patient presents with     Results     go over imaging results     History of Present Illness  Sherley Quijano is a 29 year old female who returns today for follow-up.  I evaluated the patient on 1/31/2020.  The patient had asymmetric hearing loss in the right ear.  The patient underwent an audiogram on 1/31/2020.  My review of the audiogram showed normal sloping to mild sloping to moderately severe sensorineural hearing loss in the right ear and normal sloping to moderate sensorineural hearing loss sloping to normal hearing in the left ear..  Pure-tone average was 10 dB on the right and 15 dB on the left.  Speech reception threshhold was 15 dB on the right and 15 dB on the left.  The patient had 100% word recognition on the right and 100% word recognition on the left.  The patient had a type A shallow tympanogram on the right and a type A shallow tympanogram on the left.  We decided for an MRI with IAC protocol due to her asymmetric hearing loss.  During her visit we also briefly touched on nose and sinus concerns.  She returns today to review her MRI imaging and discuss her nose and sinus symptoms.    My review of the patient's MRI from 2/17/2020 shows normal inner ear and brainstem without any signs of retrocochlear pathology.  The middle ears mastoids were clear.  Of note, the patient did have sinus mucosal thickening in the bilateral frontal sinuses, bilateral maxillary sinuses, scattered thickening in the ethmoids, and mucosal thickening in the left sphenoid sinus.    From an ear symptom standpoint, the patient was describing bilateral ear fullness and hearing loss that was worse on the right-hand side.  Is also noting sensation of intermittent otorrhea usually  worse in the wintertime with associated skin in her ear canals.  The patient reports having episodes of dizziness daily that usually last seconds.  Often times or when her head is tilted up when she is working as an .  She is a significant noise exposure history as an .  She is also a firearm user, usually wears hearing protection.  She is a right-handed shooter.  The patient denies any otalgia.  She is not had previous ear surgery aside from ear tubes as a child.  Her exam did note some obvious temporomandibular joint dysfunction.  She does have a history of migraine headache.      From a nose and sinus standpoint, the patient reports bilateral nasal obstruction, nasal congestion, postnasal drainage, decrease sense of smell.  She denies any significant rhinorrhea or taste or smell disturbance.  No previous nose or sinus surgery.  She has significant history of allergies with multiple positives on allergy testing.  She has not tolerated allergy injection immunotherapy in the past.  She is not currently using any medications in her nose.  She does take Claritin-D occasionally but does not routinely take antihistamines.  She is not on Singulair.    Past Medical History  Patient Active Problem List   Diagnosis     Mild intermittent asthma     Dysfunction of eustachian tube     Attention deficit disorder     Bipolar affective disorder (H)     Adverse reaction to viral vaccines     CARDIOVASCULAR SCREENING; LDL GOAL LESS THAN 130     Migraine headache     Prenatal care, first pregnancy     SGA (small for gestational age)     Supervision of normal first pregnancy     IUGR (intrauterine growth restriction) affecting care of mother, third trimester, fetus 1     Vaginal delivery     Chronic seasonal allergic rhinitis due to pollen     Chronic allergic rhinitis due to animal hair and dander     Allergic rhinitis due to dust mite     Allergic conjunctivitis, bilateral     Current Medications    Current  Outpatient Medications:      budesonide 0.5 MG/2ML IN neb solution, Place 0.5 mg/2 mL budesonide vial in 8 oz normal saline sinus rinse bottle.  Irrigate each nostril with one half of the bottle twice daily., Disp: 360 mL, Rfl: 3     cetirizine 10 MG PO tablet, Take 1 tablet (10 mg) by mouth daily, Disp: 90 tablet, Rfl: 3     cyclobenzaprine (FLEXERIL) 10 MG tablet, Take 1 tablet (10 mg) by mouth 3 times daily as needed for muscle spasms, Disp: 30 tablet, Rfl: 0     etonogestrel-ethinyl estradiol (NUVARING) 0.12-0.015 MG/24HR vaginal ring, Place 1 each vaginally every 28 days, Disp: 3 each, Rfl: 4     montelukast 10 MG PO tablet, Take 1 tablet (10 mg) by mouth At Bedtime, Disp: 90 tablet, Rfl: 3     albuterol (ACCUNEB) 1.25 MG/3ML nebulizer solution, Take 3 mLs by nebulization every 4 hours as needed for shortness of breath / dyspnea. (Patient not taking: Reported on 2/21/2020), Disp: 1 Box, Rfl: 3     EPINEPHrine (AUVI-Q) 0.3 MG/0.3ML injection 2-pack, Inject 0.3 mLs (0.3 mg) into the muscle as needed for anaphylaxis (Patient not taking: Reported on 2/21/2020), Disp: 0.6 mL, Rfl: 1     loratadine (CLARITIN) 10 MG tablet, Take 1 tablet (10 mg) by mouth daily as needed for allergies (Patient not taking: Reported on 2/21/2020), Disp: 30 tablet, Rfl: 3    Current Facility-Administered Medications:      triamcinolone (KENALOG-40) injection 80 mg, 80 mg, Intramuscular, Once, Suhas Sweeney MD    Allergies  Allergies   Allergen Reactions     Kiwi Anaphylaxis     Throat swelling, difficulty breathing     Lanolin Hives     Latex Hives     Penicillins Rash       Social History  Social History     Socioeconomic History     Marital status:      Spouse name: Not on file     Number of children: 0     Years of education: 12.5+     Highest education level: Not on file   Occupational History     Occupation:      Employer: [employer]     Occupation: [employer]      Employer: [employer]   Social Needs      Financial resource strain: Not on file     Food insecurity:     Worry: Not on file     Inability: Not on file     Transportation needs:     Medical: Not on file     Non-medical: Not on file   Tobacco Use     Smoking status: Former Smoker     Packs/day: 0.25     Types: Cigarettes     Last attempt to quit: 10/2017     Years since quittin.3     Smokeless tobacco: Never Used     Tobacco comment: using e-cig daily   Substance and Sexual Activity     Alcohol use: Yes     Alcohol/week: 0.0 standard drinks     Comment: rare     Drug use: No     Sexual activity: Yes     Partners: Male   Lifestyle     Physical activity:     Days per week: Not on file     Minutes per session: Not on file     Stress: Not on file   Relationships     Social connections:     Talks on phone: Not on file     Gets together: Not on file     Attends Taoism service: Not on file     Active member of club or organization: Not on file     Attends meetings of clubs or organizations: Not on file     Relationship status: Not on file     Intimate partner violence:     Fear of current or ex partner: Not on file     Emotionally abused: Not on file     Physically abused: Not on file     Forced sexual activity: Not on file   Other Topics Concern     Parent/sibling w/ CABG, MI or angioplasty before 65F 55M? No   Social History Narrative    2018    ENVIRONMENTAL HISTORY: [place] The home is heated with a wood stove and radiant heat. They do not have central air conditioning. The patient's bedroom is furnished with hard sánchez in bedroom, allergen mattress cover, allergen pillowcase cover, fabric window coverings and 2 rugs.  Pets inside the house include 1 cat. There is  history of cockroach or mice infestation. There is 1 smoker in the house.  The house does not have a damp basement.       Family History  Family History   Problem Relation Age of Onset     C.A.D. Maternal Grandfather         MI      Heart Disease Maternal Grandfather      Breast Cancer Maternal Grandmother      Depression Maternal Grandmother      Thyroid Disease Maternal Grandmother      Breast Cancer Paternal Grandmother      Alcohol/Drug Father         alcohol-recovered     Allergies Father      Allergies Sister      Neurologic Disorder Sister         Narcolepsy     Substance Abuse Sister         recovered drugs     Substance Abuse Mother         recovered alcohol     Depression Mother      Thyroid Disease Mother      Other - See Comments Other         Heart Attack       Review of Systems  As per HPI and PMHx, otherwise 10 system review including the head and neck, constitutional, eyes, respiratory, GI, skin, neurologic, lymphatic, endocrine, and allergy systems is negative.    Physical Exam  /66 (BP Location: Right arm, Patient Position: Chair, Cuff Size: Adult Regular)   Pulse 87   Temp 98.4  F (36.9  C) (Oral)   Wt 80.7 kg (178 lb)   BMI 27.88 kg/m     GENERAL: Patient is a pleasant, cooperative 29 year old female in no acute distress.  HEAD: Normocephalic, atraumatic.  Hair and scalp are normal.  EYES: Pupils are equal, round, reactive to light and accommodation.  Extraocular movements are intact.  The sclera nonicteric without injection.  The extraocular structures are normal.  EARS: Normal shape and symmetry.  No tenderness when palpating the mastoid or tragal areas bilaterally.  Otoscopic exam reveals a minimal amount of cerumen bilaterally.  The bilateral tympanic membranes are round, intact without evidence of effusion, good landmarks.  No retraction, granulation, or drainage.  NOSE: Nares are patent.  Nasal mucosa is boggy and inflamed with sticky, inflammatory mucus.  Nasal septum is midline.  The inferior turbinates are moderately hypertrophied.  No nasal cavity masses, polyps, or mucopurulence on anterior rhinoscopy.  NEUROLOGIC: Cranial nerves II through XII are grossly intact.  Voice is strong.  Patient is  House-Brackman I/VI bilaterally.  CARDIOVASCULAR: Extremities are warm and well-perfused.  No significant peripheral edema.  RESPIRATORY: Patient has nonlabored breathing without cough, wheeze, stridor.  PSYCHIATRIC: Patient is alert and oriented.  Mood and affect appear normal.  SKIN: Warm and dry.  No scalp, face, or neck lesions noted.    Assessment and Plan     ICD-10-CM    1. Chronic pansinusitis J32.4 triamcinolone (KENALOG-40) injection 80 mg     budesonide 0.5 MG/2ML IN neb solution     cetirizine 10 MG PO tablet     montelukast 10 MG PO tablet   2. Asymmetrical right sensorineural hearing loss H90.41 triamcinolone (KENALOG-40) injection 80 mg     budesonide 0.5 MG/2ML IN neb solution     cetirizine 10 MG PO tablet     montelukast 10 MG PO tablet   3. Sensorineural hearing loss, bilateral H90.3 triamcinolone (KENALOG-40) injection 80 mg     budesonide 0.5 MG/2ML IN neb solution     cetirizine 10 MG PO tablet     montelukast 10 MG PO tablet   4. History of migraine headaches Z86.69 triamcinolone (KENALOG-40) injection 80 mg     budesonide 0.5 MG/2ML IN neb solution     cetirizine 10 MG PO tablet     montelukast 10 MG PO tablet   5. Daily headache R51 triamcinolone (KENALOG-40) injection 80 mg     budesonide 0.5 MG/2ML IN neb solution     cetirizine 10 MG PO tablet     montelukast 10 MG PO tablet      It was my pleasure seeing Sherley Quijano today in clinic.  From an ear standpoint, her MRI was negative for any retrocochlear pathology.  Her hearing loss and tinnitus are likely related to noise exposure.  We discussed protecting her hearing in noisy environments.  We discussed repeating an audiogram in 1 to 3 years or sooner if she notices significant hearing change.  She would not be a great candidate for hearing aid at this time but could try hearing aids in the future if she is having issues.    The patient does have a history of headache and migraine headache.  She has an appointment to see neurology in  April.  She will keep that appointment.    From a nose and sinus standpoint, the patient did have some significant sinus inflammation on her MRI.  This is likely related to chronic sinusitis.  We discussed an aggressive nasal regimen including twice daily budesonide irrigations.  The patient would benefit from an 88 mg injection of Kenalog today in clinic. We discussed the risks, benefits, options, goals of a intramuscular Kenalog injection today in office including, but not limited to: Risk of pain at injection site, risk of infection, side effects of systemic steroids including blood pressure problems, insulin resistance, weight gain, bone/joint issues, mood alteration.  Patient voiced understanding and is willing to proceed.    I will have the patient take 10 mg of Zyrtec daily in the morning.  We discussed taking this for 1 to 2 weeks to help decrease the daytime tiredness.  Usually the medication side effect decreases with time.  We discussed taking Singulair at bedtime.  The patient knows to contact me if budesonide medications too expensive.    The patient is transitioning insurances currently.  She knows to contact me if she is not improving with medical management.  If is not improving, we would consider a CT scan of her sinuses.    Suhas Sweeney MD  Department of Otolarygology-Head and Neck Surgery  [facility]       Again, thank you for allowing me to participate in the care of your patient.        Sincerely,        Suhas Sweeney MD

## 2020-02-21 NOTE — PATIENT INSTRUCTIONS
Per physician's instructions    1) Zyrtec daily.  2) Singular at bedtime.  3) Twice daily nasal saline irrigation with budesonide.  4) Allergy referral for consideration of allergy testing.   5) Return following allergy evaluation.    BUDESONIDE IRRIGATION INSTRUCTIONS    You will be starting Budesonide nasal irrigations and will need to obtain the following:      - NeilMed Sinus Rinse 8 oz Kit  - Distilled or filtered water   - Normal saline salt packets  - Budesonide medication     Place filtered or distilled water into the NeilMed bottle up to the fill line (DO NOT USE TAP OR WELL WATER).  Place the pre-made salt packet in the 8 oz of saline.  Then placed the budesonide medication into the bottle.  Shake the bottle to suspend into solution.  Lean head forward over a sink or a basin.  Rinse each side of the nose with one-half of the bottle (each squeeze is about one-half of the bottle). Rinse the nose twice daily.     You will follow up with your ENT surgeon in 8-12 weeks to recheck your symptoms.  If you are having problems with your irrigations or problems obtaining the medication, please contact your ENT surgeon.    Video example: [email or web address]

## 2020-06-30 ENCOUNTER — OFFICE VISIT (OUTPATIENT)
Dept: NEUROLOGY | Facility: CLINIC | Age: 30
End: 2020-06-30
Attending: OTOLARYNGOLOGY
Payer: COMMERCIAL

## 2020-06-30 ENCOUNTER — TELEPHONE (OUTPATIENT)
Dept: NEUROLOGY | Facility: CLINIC | Age: 30
End: 2020-06-30

## 2020-06-30 VITALS
HEART RATE: 81 BPM | BODY MASS INDEX: 28.35 KG/M2 | WEIGHT: 181 LBS | RESPIRATION RATE: 12 BRPM | TEMPERATURE: 98.2 F | DIASTOLIC BLOOD PRESSURE: 72 MMHG | SYSTOLIC BLOOD PRESSURE: 131 MMHG

## 2020-06-30 DIAGNOSIS — G43.009 MIGRAINE WITHOUT AURA AND WITHOUT STATUS MIGRAINOSUS, NOT INTRACTABLE: Primary | ICD-10-CM

## 2020-06-30 DIAGNOSIS — R41.89 SUBJECTIVE MEMORY COMPLAINTS: ICD-10-CM

## 2020-06-30 DIAGNOSIS — R90.89 MRI OF BRAIN ABNORMAL: ICD-10-CM

## 2020-06-30 DIAGNOSIS — R20.2 PARESTHESIAS: ICD-10-CM

## 2020-06-30 DIAGNOSIS — R42 DIZZINESS: ICD-10-CM

## 2020-06-30 LAB
TSH SERPL DL<=0.005 MIU/L-ACNC: 3.03 MU/L (ref 0.4–4)
VIT B12 SERPL-MCNC: 354 PG/ML (ref 193–986)

## 2020-06-30 PROCEDURE — 82607 VITAMIN B-12: CPT | Performed by: PSYCHIATRY & NEUROLOGY

## 2020-06-30 PROCEDURE — 36415 COLL VENOUS BLD VENIPUNCTURE: CPT | Performed by: PSYCHIATRY & NEUROLOGY

## 2020-06-30 PROCEDURE — 99205 OFFICE O/P NEW HI 60 MIN: CPT | Performed by: PSYCHIATRY & NEUROLOGY

## 2020-06-30 PROCEDURE — 84443 ASSAY THYROID STIM HORMONE: CPT | Performed by: PSYCHIATRY & NEUROLOGY

## 2020-06-30 NOTE — PROGRESS NOTES
INITIAL NEUROLOGY CONSULTATION    DATE OF VISIT: 6/30/2020  MRN: [id number]  PATIENT NAME: Sherley Quijano  YOB: 1990    REFERRING PROVIDER: Suhas Sweeney    Chief Complaint   Patient presents with     New Patient     Dizziness.  Referralby Suhas Sweeney MD       SUBJECTIVE:                                                      HPI:   Sherley Quijano is a 29 year old female whom I was asked by Dr. Sweeney to see in consultation for dizziness. Per chart review, she saw Dr. Nevarez in ENT in 2018 for a feeling of fluid in the ears. She reported hearing troubles since childhood at that time. He cleaned out a cerumen impaction on the Right then and recommended an audiogram in a couple of weeks. Upon follow-up her testing revealed Right>Left sensorineural hearing loss. She saw Dr. sweeney in follow-up in 1.2020 after recurrent Ear infections. She described brief episodes of dizziness then. He ordered a brain MRI which showed some dural enhancement of unclear etiology. Upon follow-up he noted that she has history of migraines.      Sherley tells me that she generally wakes with a headache every day. Some days these evolve into her migraine headaches which are more severe and associated with light and sound sensitivity and nausea. She describes frontal pain at times, other times the headaches lateralize to one side or the other. No tearing up or runny nose with these. No clear headache triggers. No history of major head or neck injuries. She does notice some visual changes during the headaches. No visual aura beforehand. She can tell when the headache is evolving into a migraine.     When she was 16 she took Imitrex for the headaches. She does occasionally take ibuprofen these days, but for the most part avoids medications for the pain and she is able to function through the headache, work, etc. She has never been on a preventative medication for headaches.    I do note that she has some TMJ dysfunction, per Dr. Sweeney's  notes. Sherley says she tried mouthguards. These did not really help. He says her dentist told her she does not grind her teeth or clench. She says the only problem she notices is some clicking of her jaw when she eats.     Sherley brings up several additional neurologic concerns:    Some paresthesias in the hands (all fingers). No definite radiating pain from the neck. She says she just assumed the numbness in her hands is carpal tunnel syndrome though she has not investigated into this further. This comes and goes.    She says the dizzy spells usually occur when she is walking or if she is working above her head with her head tipped up for extended periods. She does notice when her headaches are bad, the dizzy spells seem to be more frequent but she can also experience the brief dizzy spells when she is not in pain. The spells are brief. No falls with this. No dizziness with laying in bed.     She says she also has some problems with memory. She has noticed that she relies more on notes and cannot remember peoples names. Her mother also has a poor memory, but has never had any evaluation for this. No documented family history of early-onset dementia that Sherley is aware of. She feels that her own memory is progressively getting worse.      Past Medical History:   Diagnosis Date     Chickenpox      Chlamydia     age 18     Past Surgical History:   Procedure Laterality Date     D & C  2006    TAB     MOUTH SURGERY  2008    wisdom teeth       albuterol (ACCUNEB) 1.25 MG/3ML nebulizer solution, Take 3 mLs by nebulization every 4 hours as needed for shortness of breath / dyspnea.  budesonide 0.5 MG/2ML IN neb solution, Place 0.5 mg/2 mL budesonide vial in 8 oz normal saline sinus rinse bottle.  Irrigate each nostril with one half of the bottle twice daily.  cetirizine 10 MG PO tablet, Take 1 tablet (10 mg) by mouth daily  cyclobenzaprine (FLEXERIL) 10 MG tablet, Take 1 tablet (10 mg) by mouth 3 times daily as needed for muscle  spasms  EPINEPHrine (AUVI-Q) 0.3 MG/0.3ML injection 2-pack, Inject 0.3 mLs (0.3 mg) into the muscle as needed for anaphylaxis  montelukast 10 MG PO tablet, Take 1 tablet (10 mg) by mouth At Bedtime    No current facility-administered medications on file prior to visit.     Allergies   Allergen Reactions     Kiwi Anaphylaxis     Throat swelling, difficulty breathing     Lanolin Hives     Latex Hives     Penicillins Rash        Problem (# of Occurrences) Relation (Name,Age of Onset)    Alcohol/Drug (1) Father (Ludwin): alcohol-recovered    Allergies (2) Father (Ludwin), Sister (Paula)    Breast Cancer (2) Maternal Grandmother (Nae), Paternal Grandmother (Olivia)    C.A.D. (1) Maternal Grandfather: MI    Depression (2) Maternal Grandmother (Nae), Mother (Nettie)    Heart Disease (1) Maternal Grandfather    Neurologic Disorder (1) Sister (Paula): Narcolepsy    Other - See Comments (1) Other (mat Grt Aunt): Heart Attack    Substance Abuse (2) Sister (Paula): recovered drugs, Mother (Nettie): recovered alcohol    Thyroid Disease (2) Maternal Grandmother (Nae), Mother (Nettie)        Social History     Tobacco Use     Smoking status: Former Smoker     Packs/day: 0.25     Types: Cigarettes     Last attempt to quit: 10/2017     Years since quittin.7     Smokeless tobacco: Never Used     Tobacco comment: using e-cig daily   Substance Use Topics     Alcohol use: Yes     Alcohol/week: 0.0 standard drinks     Comment: rare     Drug use: No       REVIEW OF SYSTEMS:                                                      10-point review of systems is negative except as mentioned above in HPI.     EXAM:                                                      Physical Exam:   Vitals: /72 (BP Location: Left arm, Patient Position: Sitting, Cuff Size: Adult Regular)   Pulse 81   Temp 98.2  F (36.8  C) (Tympanic)   Resp 12   Wt 82.1 kg (181 lb)   BMI 28.35 kg/m    BMI= Body mass index is 28.35 kg/m .  GENERAL: NAD.  HEENT:  NC/AT.   CV: RRR. S1, S2.   NECK: No bruits.  Neurologic:  MENTAL STATUS: Alert, attentive. Somewhat tangential. Speech is fluent. Normal comprehension. Mini-co/5. Normal  concentration. Adequate fund of knowledge.   CRANIAL NERVES: Discs flat. Visual fields intact to confrontation. Pupils equally, round and reactive to light. Facial sensation and movement normal. EOM full. Hearing intact to conversation. Trapezius strength intact. Palate moves symmetrically. Tongue midline.  MOTOR: 5/5 in proximal and distal muscle groups of upper and lower extremities. Tone and bulk normal.   DTRs: Intact and symmetric. Babinski down-going.   SENSATION: Normal light touch and pinprick. Intact proprioception. Vibration: Normal at both ankles.   COORDINATION: Normal finger nose finger. Finger tapping normal. Knee heel shin normal.  STATION AND GAIT: Romberg negative. Casual gait and tandem normal.  SPECIAL TESTS: Negative Tinel test and Phalen maneuver.     Relevant Data:   MRI Brain (20):  IMPRESSION:  1. No definite etiology identified to explain the patient's reported  asymmetric hearing loss.  2. There is suggestion of mild smooth dural thickening and enhancement  overlying the frontal polar regions bilaterally. This could be  potentially related to mild dural thickening and enhancement that is  at the upper limits of normal/normal variation or due to overlying  traversing cortical veins. Other etiologies of smooth diffuse  pachymeningeal enhancement (including but not limited to intracranial  hypotension, infectious/inflammatory etiologies, etc.) are thought to  be less likely particularly given the lack of abnormal signal on T2  FLAIR weighted imaging, but not entirely excluded. Clinical  correlation is recommended. Consider lumbar puncture if clinically  warranted.  3. Moderate generalized paranasal sinus mucosal thickening with  air-fluid level in the right maxillary sinus. Correlate clinically for  symptoms/signs  of acute/acute on chronic sinusitis    Imaging reviewed by me and with the patient. Agree with Radiology read.     ASSESSMENT and PLAN:                                                      Assessment and Plan:     ICD-10-CM    1. Migraine without aura and without status migrainosus, not intractable  G43.009 MR Brain w/o & w Contrast   2. Paresthesias  R20.2 Vitamin B12     MR Cervical Spine w/o Contrast   3. Subjective memory complaints  R41.89 Vitamin B12     TSH with free T4 reflex   4. Dizziness  R42 MR Brain w/o & w Contrast     MR Cervical Spine w/o Contrast   5. MRI of brain abnormal  R90.89 MR Brain w/o & w Contrast        Ms. Zuniga is a pleasant 30 yo woman with history of asymmetric sensorineural hearing loss referred to Neurology for chronic headache problems. Sherley had several concerns today. We decided to focus mainly on the headaches and MRI findings.     We discussed the dural thickening and enhancement seen on her MRI from February and potential causes for this. May be a normal variant in her case, and is less likely infectious or inflammatory. This finding is not likely correlated with her headaches either, given the long history and typical migraine features. I recommend we repeat the brain MRI and I will add cervical imaging as well, given the upper extremity paresthesias she describes. The dizziness may be related to vestibular dysfunction or a phenomenon related to her migraines. Will consider physical therapy evaluation in the future.     We will discuss the memory concerns in more detail upon follow-up. She may have some underlying attention deficits, as she describes lifelong problems with recall. I will check a B12 and TSH today, as a start.    For headache management, Sherley is not interested in a daily preventative t this time. We will try an alternative triptan for now, Maxalt. I instructed Sherley to take this medication when she feels that her headache is evolving into a migraine.     I would  like to see Sherley back in clinic in about 3 months. She understands and agrees with the plan.    Patient Instructions:  -- Maxalt (rizatriptan) as needed for migraine symptoms. *Information provided.  -- Labs today: Vitamin B12 and Thyroid function.   -- MRI brain and cervical spine.   -- We will notify you of the test results and let you know if any additional evaluations are recommended.  -- Return to Neurology in 3 months.     Total Time: 60 minutes were spent with the patient and in chart review/documentation. More than 50% of the time spent on counseling (as described above in Assessment and Plan/Instructions)/coordinating the care.    Mima Felipe MD  Neurology    CC: Suhas Sweeney MD

## 2020-06-30 NOTE — TELEPHONE ENCOUNTER
FUTURE VISIT INFORMATION        FUTURE VISIT INFORMATION:    Date: 6/30/20    Time:  1445    Location: [facility]   REFERRAL INFORMATION:    Referring provider:  Suhas Sweeney MD    Referring providers clinic:  [facility]     Reason for visit/diagnosis:  Headache, Dizziness        NOTES (FOR ALL VISITS) STATUS DETAILS   OFFICE NOTE from referring provider Printed 1/31/20, 2/21/20   OFFICE NOTE from other specialist Printed  FLORENCIO Rizzo (FP) 4/16/18  Dr. Nevarez (ENT) 4/25/18, 5/16/18   DISCHARGE SUMMARY from hospital      DISCHARGE REPORT from the ER     MEDICATION LIST In Epic     IMAGING  (FOR ALL VISITS)       EMG      EEG      MRI (HEAD, NECK, SPINE)  Reports printed   Images in PACs   MRI brain 2/17/20   CARDIAC STUDIES      FORMAL COGNITIVE TESTING      OTHER

## 2020-06-30 NOTE — LETTER
6/30/2020         RE: Sherley Quijano  [address] [facility] [address]        Dear Colleague,    Thank you for referring your patient, Sherley Quijano, to the [facility]. Please see a copy of my visit note below.    INITIAL NEUROLOGY CONSULTATION    DATE OF VISIT: 6/30/2020  MRN: [id number]  PATIENT NAME: Sherley Quijano  YOB: 1990    REFERRING PROVIDER: Suhas Sweeney    Chief Complaint   Patient presents with     New Patient     Dizziness.  Referralby Suhas Sweeney MD       SUBJECTIVE:                                                      HPI:   Sherley Quijano is a 29 year old female whom I was asked by Dr. Sweeney to see in consultation for dizziness. Per chart review, she saw Dr. Nevarez in ENT in 2018 for a feeling of fluid in the ears. She reported hearing troubles since childhood at that time. He cleaned out a cerumen impaction on the Right then and recommended an audiogram in a couple of weeks. Upon follow-up her testing revealed Right>Left sensorineural hearing loss. She saw Dr. sweeney in follow-up in 1.2020 after recurrent Ear infections. She described brief episodes of dizziness then. He ordered a brain MRI which showed some dural enhancement of unclear etiology. Upon follow-up he noted that she has history of migraines.      Sherley tells me that she generally wakes with a headache every day. Some days these evolve into her migraine headaches which are more severe and associated with light and sound sensitivity and nausea. She describes frontal pain at times, other times the headaches lateralize to one side or the other. No tearing up or runny nose with these. No clear headache triggers. No history of major head or neck injuries. She does notice some visual changes during the headaches. No visual aura beforehand. She can tell when the headache is evolving into a migraine.     When she was 16 she took Imitrex for the headaches. She does occasionally take ibuprofen these days, but  for the most part avoids medications for the pain and she is able to function through the headache, work, etc. She has never been on a preventative medication for headaches.    I do note that she has some TMJ dysfunction, per Dr. Sweeney's notes. Sherley says she tried mouthguards. These did not really help. He says her dentist told her she does not grind her teeth or clench. She says the only problem she notices is some clicking of her jaw when she eats.     Sherley brings up several additional neurologic concerns:    Some paresthesias in the hands (all fingers). No definite radiating pain from the neck. She says she just assumed the numbness in her hands is carpal tunnel syndrome though she has not investigated into this further. This comes and goes.    She says the dizzy spells usually occur when she is walking or if she is working above her head with her head tipped up for extended periods. She does notice when her headaches are bad, the dizzy spells seem to be more frequent but she can also experience the brief dizzy spells when she is not in pain. The spells are brief. No falls with this. No dizziness with laying in bed.     She says she also has some problems with memory. She has noticed that she relies more on notes and cannot remember peoples names. Her mother also has a poor memory, but has never had any evaluation for this. No documented family history of early-onset dementia that Sherley is aware of. She feels that her own memory is progressively getting worse.      Past Medical History:   Diagnosis Date     Chickenpox      Chlamydia     age 18     Past Surgical History:   Procedure Laterality Date     D & C  2006    TAB     MOUTH SURGERY  2008    wisdom teeth       albuterol (ACCUNEB) 1.25 MG/3ML nebulizer solution, Take 3 mLs by nebulization every 4 hours as needed for shortness of breath / dyspnea.  budesonide 0.5 MG/2ML IN neb solution, Place 0.5 mg/2 mL budesonide vial in 8 oz normal saline sinus rinse  bottle.  Irrigate each nostril with one half of the bottle twice daily.  cetirizine 10 MG PO tablet, Take 1 tablet (10 mg) by mouth daily  cyclobenzaprine (FLEXERIL) 10 MG tablet, Take 1 tablet (10 mg) by mouth 3 times daily as needed for muscle spasms  EPINEPHrine (AUVI-Q) 0.3 MG/0.3ML injection 2-pack, Inject 0.3 mLs (0.3 mg) into the muscle as needed for anaphylaxis  montelukast 10 MG PO tablet, Take 1 tablet (10 mg) by mouth At Bedtime    No current facility-administered medications on file prior to visit.     Allergies   Allergen Reactions     Kiwi Anaphylaxis     Throat swelling, difficulty breathing     Lanolin Hives     Latex Hives     Penicillins Rash        Problem (# of Occurrences) Relation (Name,Age of Onset)    Alcohol/Drug (1) Father (Ludwin): alcohol-recovered    Allergies (2) Father (Ludwin), Sister (Paula)    Breast Cancer (2) Maternal Grandmother (Nae), Paternal Grandmother (Olviia)    C.A.D. (1) Maternal Grandfather: MI    Depression (2) Maternal Grandmother (Nae), Mother (Nettie)    Heart Disease (1) Maternal Grandfather    Neurologic Disorder (1) Sister (Paula): Narcolepsy    Other - See Comments (1) Other (mat Grt Aunt): Heart Attack    Substance Abuse (2) Sister (Paula): recovered drugs, Mother (Nettie): recovered alcohol    Thyroid Disease (2) Maternal Grandmother (Nae), Mother (Nettie)        Social History     Tobacco Use     Smoking status: Former Smoker     Packs/day: 0.25     Types: Cigarettes     Last attempt to quit: 10/2017     Years since quittin.7     Smokeless tobacco: Never Used     Tobacco comment: using e-cig daily   Substance Use Topics     Alcohol use: Yes     Alcohol/week: 0.0 standard drinks     Comment: rare     Drug use: No       REVIEW OF SYSTEMS:                                                      10-point review of systems is negative except as mentioned above in HPI.     EXAM:                                                      Physical Exam:   Vitals: /72  (BP Location: Left arm, Patient Position: Sitting, Cuff Size: Adult Regular)   Pulse 81   Temp 98.2  F (36.8  C) (Tympanic)   Resp 12   Wt 82.1 kg (181 lb)   BMI 28.35 kg/m    BMI= Body mass index is 28.35 kg/m .  GENERAL: NAD.  HEENT: NC/AT.   CV: RRR. S1, S2.   NECK: No bruits.  Neurologic:  MENTAL STATUS: Alert, attentive. Somewhat tangential. Speech is fluent. Normal comprehension. Mini-co/5. Normal  concentration. Adequate fund of knowledge.   CRANIAL NERVES: Discs flat. Visual fields intact to confrontation. Pupils equally, round and reactive to light. Facial sensation and movement normal. EOM full. Hearing intact to conversation. Trapezius strength intact. Palate moves symmetrically. Tongue midline.  MOTOR: 5/5 in proximal and distal muscle groups of upper and lower extremities. Tone and bulk normal.   DTRs: Intact and symmetric. Babinski down-going.   SENSATION: Normal light touch and pinprick. Intact proprioception. Vibration: Normal at both ankles.   COORDINATION: Normal finger nose finger. Finger tapping normal. Knee heel shin normal.  STATION AND GAIT: Romberg negative. Casual gait and tandem normal.  SPECIAL TESTS: Negative Tinel test and Phalen maneuver.     Relevant Data:   MRI Brain (20):  IMPRESSION:  1. No definite etiology identified to explain the patient's reported  asymmetric hearing loss.  2. There is suggestion of mild smooth dural thickening and enhancement  overlying the frontal polar regions bilaterally. This could be  potentially related to mild dural thickening and enhancement that is  at the upper limits of normal/normal variation or due to overlying  traversing cortical veins. Other etiologies of smooth diffuse  pachymeningeal enhancement (including but not limited to intracranial  hypotension, infectious/inflammatory etiologies, etc.) are thought to  be less likely particularly given the lack of abnormal signal on T2  FLAIR weighted imaging, but not entirely excluded.  Clinical  correlation is recommended. Consider lumbar puncture if clinically  warranted.  3. Moderate generalized paranasal sinus mucosal thickening with  air-fluid level in the right maxillary sinus. Correlate clinically for  symptoms/signs of acute/acute on chronic sinusitis    Imaging reviewed by me and with the patient. Agree with Radiology read.     ASSESSMENT and PLAN:                                                      Assessment and Plan:     ICD-10-CM    1. Migraine without aura and without status migrainosus, not intractable  G43.009 MR Brain w/o & w Contrast   2. Paresthesias  R20.2 Vitamin B12     MR Cervical Spine w/o Contrast   3. Subjective memory complaints  R41.89 Vitamin B12     TSH with free T4 reflex   4. Dizziness  R42 MR Brain w/o & w Contrast     MR Cervical Spine w/o Contrast   5. MRI of brain abnormal  R90.89 MR Brain w/o & w Contrast        Ms. Zuniga is a pleasant 30 yo woman with history of asymmetric sensorineural hearing loss referred to Neurology for chronic headache problems. Sherley had several concerns today. We decided to focus mainly on the headaches and MRI findings.     We discussed the dural thickening and enhancement seen on her MRI from February and potential causes for this. May be a normal variant in her case, and is less likely infectious or inflammatory. This finding is not likely correlated with her headaches either, given the long history and typical migraine features. I recommend we repeat the brain MRI and I will add cervical imaging as well, given the upper extremity paresthesias she describes. The dizziness may be related to vestibular dysfunction or a phenomenon related to her migraines. Will consider physical therapy evaluation in the future.     We will discuss the memory concerns in more detail upon follow-up. She may have some underlying attention deficits, as she describes lifelong problems with recall. I will check a B12 and TSH today, as a start.    For  headache management, Sherley is not interested in a daily preventative t this time. We will try an alternative triptan for now, Maxalt. I instructed Sherley to take this medication when she feels that her headache is evolving into a migraine.     I would like to see Sherley back in clinic in about 3 months. She understands and agrees with the plan.    Patient Instructions:  -- Maxalt (rizatriptan) as needed for migraine symptoms. *Information provided.  -- Labs today: Vitamin B12 and Thyroid function.   -- MRI brain and cervical spine.   -- We will notify you of the test results and let you know if any additional evaluations are recommended.  -- Return to Neurology in 3 months.     Total Time: 60 minutes were spent with the patient and in chart review/documentation. More than 50% of the time spent on counseling (as described above in Assessment and Plan/Instructions)/coordinating the care.    Mima Felipe MD  Neurology    CC: Suhas Sweeney MD      Again, thank you for allowing me to participate in the care of your patient.        Sincerely,        Mima Felipe MD

## 2020-06-30 NOTE — PATIENT INSTRUCTIONS
Plan:    -- Maxalt (rizatriptan) as needed for migraine symptoms. *Information provided.  -- Labs today: Vitamin B12 and Thyroid function.   -- MRI brain and cervical spine.   -- We will notify you of the test results and let you know if any additional evaluations are recommended.  --Return to Neurology in 3 months.

## 2020-06-30 NOTE — NURSING NOTE
"Initial /72 (BP Location: Left arm, Patient Position: Sitting, Cuff Size: Adult Regular)   Pulse 81   Temp 98.2  F (36.8  C) (Tympanic)   Resp 12   Wt 82.1 kg (181 lb)   BMI 28.35 kg/m   Estimated body mass index is 28.35 kg/m  as calculated from the following:    Height as of 1/13/20: 1.702 m (5' 7\").    Weight as of this encounter: 82.1 kg (181 lb).     Morelia BHAGATA      "

## 2020-07-01 NOTE — RESULT ENCOUNTER NOTE
Please advise Sherley Quijano,  1990, that her thyroid function is normal. The vitamin B12 level is on the low side, so I recommend she start a daily supplement of 1000mcg.  [phone] (home)   Mima Felipe MD

## 2020-07-05 ENCOUNTER — HOSPITAL ENCOUNTER (OUTPATIENT)
Dept: MRI IMAGING | Facility: CLINIC | Age: 30
End: 2020-07-05
Attending: PSYCHIATRY & NEUROLOGY
Payer: COMMERCIAL

## 2020-07-05 DIAGNOSIS — R42 DIZZINESS: ICD-10-CM

## 2020-07-05 DIAGNOSIS — R20.2 PARESTHESIAS: ICD-10-CM

## 2020-07-05 DIAGNOSIS — G43.009 MIGRAINE WITHOUT AURA AND WITHOUT STATUS MIGRAINOSUS, NOT INTRACTABLE: ICD-10-CM

## 2020-07-05 DIAGNOSIS — R90.89 MRI OF BRAIN ABNORMAL: ICD-10-CM

## 2020-07-05 PROCEDURE — 72141 MRI NECK SPINE W/O DYE: CPT

## 2020-07-05 PROCEDURE — 70553 MRI BRAIN STEM W/O & W/DYE: CPT

## 2020-07-05 PROCEDURE — 25500064 ZZH RX 255 OP 636: Performed by: PSYCHIATRY & NEUROLOGY

## 2020-07-05 PROCEDURE — A9585 GADOBUTROL INJECTION: HCPCS | Performed by: PSYCHIATRY & NEUROLOGY

## 2020-07-05 RX ORDER — GADOBUTROL 604.72 MG/ML
8 INJECTION INTRAVENOUS ONCE
Status: COMPLETED | OUTPATIENT
Start: 2020-07-05 | End: 2020-07-05

## 2020-07-05 RX ADMIN — GADOBUTROL 8 ML: 604.72 INJECTION INTRAVENOUS at 08:58

## 2020-07-06 NOTE — RESULT ENCOUNTER NOTE
Please advise Sherley Quijano,  1990, that her brain MRI is stable. At this point it seems likely that that dural thickening seen on imaging is a normal variant for her. I do not think further studies are needed for this at this time. The sinus congestion has improved. The cervical spine imaging show very mild degenerative changes but nothing of concern.   [phone] (home)   Mima Felipe MD

## 2020-08-19 ENCOUNTER — VIRTUAL VISIT (OUTPATIENT)
Dept: FAMILY MEDICINE | Facility: CLINIC | Age: 30
End: 2020-08-19
Payer: COMMERCIAL

## 2020-08-19 ENCOUNTER — APPOINTMENT (OUTPATIENT)
Dept: FAMILY MEDICINE | Facility: CLINIC | Age: 30
End: 2020-08-19
Payer: COMMERCIAL

## 2020-08-19 VITALS — TEMPERATURE: 98.3 F

## 2020-08-19 DIAGNOSIS — J35.01 CHRONIC STREPTOCOCCAL TONSILLITIS: ICD-10-CM

## 2020-08-19 DIAGNOSIS — J02.9 SORE THROAT: Primary | ICD-10-CM

## 2020-08-19 DIAGNOSIS — J03.00 CHRONIC STREPTOCOCCAL TONSILLITIS: ICD-10-CM

## 2020-08-19 LAB
DEPRECATED S PYO AG THROAT QL EIA: NEGATIVE
SPECIMEN SOURCE: NORMAL
SPECIMEN SOURCE: NORMAL
STREP GROUP A PCR: NOT DETECTED

## 2020-08-19 PROCEDURE — 99213 OFFICE O/P EST LOW 20 MIN: CPT | Mod: 95 | Performed by: NURSE PRACTITIONER

## 2020-08-19 PROCEDURE — 87651 STREP A DNA AMP PROBE: CPT | Performed by: NURSE PRACTITIONER

## 2020-08-19 PROCEDURE — 40001204 ZZHCL STATISTIC STREP A RAPID: Performed by: NURSE PRACTITIONER

## 2020-08-19 NOTE — PROGRESS NOTES
"  SUBJECTIVE   Sherley Quijano is a  female who is being evaluated via a billable video visit.    The patient has been notified of following:   \"This video visit will be conducted via a call between you and your physician/provider. We have found that certain health care needs can be provided without the need for an in-person physical exam.  This service lets us provide the care you need with a video conversation.  If a prescription is necessary we can send it directly to your pharmacy.  If lab work is needed we can place an order for that and you can then stop by our lab to have the test done at a later time.    Video visits are billed at different rates depending on your insurance coverage.  Please reach out to your insurance provider with any questions.    If during the course of the call the physician/provider feels a video visit is not appropriate, you will not be charged for this service.\"    Patient has given verbal consent for Video visit? Yes    How would you like to obtain your AVS? [facility] [place]    Patient would like the video invitation sent by: Text to cell phone: [phone]    Will anyone else be joining your video visit? No    If patient encounters technical issues, they should call: [phone]    [facility]     Sherley Quijano presents with the following concern(s):    Acute Illness  Acute illness concerns: throat pain  Onset/Duration: 1 day  Symptoms:  Fever: no  Chills/Sweats: YES  Headache (location?): no  Sinus Pressure: YES  Conjunctivitis:  no  Ear Pain: YES: Right  Rhinorrhea: YES  Congestion: YES  Sore Throat: YES  Cough: no  Wheeze: no  Decreased Appetite: no  Nausea: no  Vomiting: no  Diarrhea: YES  Dysuria/Freq.: no  Dysuria or Hematuria: no  Fatigue/Achiness: YES  Sick/Strep Exposure: Recent strep dx 7/23/2020, COVID negative  Therapies tried and outcome: keflex treatment - did feel better after finishing.   Patient wants tonsils out    Video Start Time:   15:16 on Doximity    15:22 on Doximity    PCP "   Cyndee Rizzo, APRN -[phone]    Health Maintenance        Health Maintenance Due   Topic Date Due     LIPID  03/28/2013     PREVENTIVE CARE VISIT  09/22/2017     PAP  09/22/2019     ASTHMA CONTROL TEST  07/13/2020        PROBLEM LIST        Patient Active Problem List   Diagnosis     Mild intermittent asthma     Dysfunction of eustachian tube     Attention deficit disorder     Bipolar affective disorder (H)     Adverse reaction to viral vaccines     CARDIOVASCULAR SCREENING; LDL GOAL LESS THAN 130     Migraine headache     Prenatal care, first pregnancy     SGA (small for gestational age)     Supervision of normal first pregnancy     IUGR (intrauterine growth restriction) affecting care of mother, third trimester, fetus 1     Vaginal delivery     Chronic seasonal allergic rhinitis due to pollen     Chronic allergic rhinitis due to animal hair and dander     Allergic rhinitis due to dust mite     Allergic conjunctivitis, bilateral       MEDICATIONS        Current Outpatient Medications   Medication     albuterol (ACCUNEB) 1.25 MG/3ML nebulizer solution     budesonide 0.5 MG/2ML IN neb solution     cetirizine 10 MG PO tablet     cyclobenzaprine (FLEXERIL) 10 MG tablet     montelukast 10 MG PO tablet     EPINEPHrine (AUVI-Q) 0.3 MG/0.3ML injection 2-pack     No current facility-administered medications for this visit.        Reviewed and updated as needed this visit by Provider:  Tobacco  Allergies  Meds  Med Hx  Surg Hx  Fam Hx  Soc Hx     ROS      Constitutional, HEENT, cardiovascular, pulmonary, gi and gu systems are negative, except as otherwise noted.      PHYSICAL EXAM   There were no vitals taken for this visit  No LMP recorded.    GENERAL: Healthy, alert and no distress  RESP: no audible wheeze, cough, or visible cyanosis.  No visible retractions or increased work of breathing.  Able to speak fully in complete sentences.  NEURO: Cranial nerves grossly intact, mentation intact and speech  normal  PSYCH: mentation appears normal, affect normal/bright, judgement and insight intact, normal speech and appearance well-groomed    Results for orders placed or performed in visit on 08/19/20   Streptococcus A Rapid Scr w Reflx to PCR     Status: None    Specimen: Throat   Result Value Ref Range    Strep Specimen Description Throat     Streptococcus Group A Rapid Screen Negative NEG^Negative       ASSESSMENT & PLAN     1. Sore throat  Acute, stable  - Streptococcus A Rapid Scr w Reflx to PCR; Future  If positive, will treat with antibiotic sent to [facility]    2. Chronic streptococcal tonsillitis  Historical  - Streptococcus A Rapid Scr w Reflx to PCR; Future      If positive:  1. Antibiotics- take complete dosing  2. Throw out your toothbrush after 24 hours of antibiotic use  3. Children should remain at home for the first 24 hours on antibiotic    It negative:  Treat like viral infection: see below  Pharyngitis: Strep [Confirmed]    Your test for strep throat was positive. Strep throat is a contagious illness. It is spread by coughing, kissing or by touching others after touching your mouth or nose. Symptoms include throat pain which is worse with swallowing, aching all over, headache and fever. You will be treated with an antibiotic which should make you start to feel better within 1-2 days.  Home Care:    Rest at home and drink plenty of fluids to avoid dehydration.    No school or work for the first two days on antibiotics. You will not be contagious after this time and if you are feeling better, you can return to school or work.    Take your antibiotics for a full 10 days, even if you feel better after the first few days of treatment. This is very important to prevent heart or kidney disease that can result as a complication of untreated strep throat infection.    Children: Use acetaminophen (Tylenol) for fever, fussiness or discomfort. In infants over six months of age, you may use ibuprofen  (Children's Motrin) instead of Tylenol. [NOTE: If your child has chronic liver or kidney disease or ever had a stomach ulcer or GI bleeding, talk with your doctor before using these medicines.] (Aspirin should never be used in anyone under 18 years of age who is ill with a fever. It may cause severe liver damage.)Adults: You may use acetaminophen (Tylenol) or ibuprofen (Motrin, Advil) to control pain or fever, unless another medicine was prescribed for this. [NOTE: If you have chronic liver or kidney disease or ever had a stomach ulcer or GI bleeding, talk with your doctor before using these medicines.]    Throat lozenges or sprays (Chloraseptic and others) will reduce pain. Gargling with warm salt water will also reduce throat pain. Dissolve 1/2 teaspoon of salt in 1 glass of warm water. This is especially useful just before meals.  Follow Up  with your doctor or as directed by our staff if you are not improving over the next week.  Get Prompt Medical Attention  if any of the following occur:    Fever of 100.4 F (38 C) oral or higher, not better with fever medication    New or worsening ear pain, sinus pain or headache    Painful lumps in the back of your neck    Unable to swallow liquids or open your mouth wide due to throat pain    Trouble breathing or noisy breathing    Muffled voice    New rash    [id number] [facility], [address] [place] [address], [place] [address]. All rights reserved. This information is not intended as a substitute for professional medical care. Always follow your healthcare professional's instructions.        Adult Self-Care for Colds  Colds are caused by viruses. They can t be cured with antibiotics. However, you can relieve symptoms and support your body s efforts to heal itself. No matter which symptoms you have, be sure to drink plenty of fluids (water or clear soup); stop smoking and drinking alcohol; and get plenty of rest.   Understand a Fever    Take your temperature several times a  day. If your fever is 100.4 F for more than a day, call your doctor.    Relax, lie down. Go to bed if you want. Just get off your feet and rest. Also, drink plenty of fluids to avoid dehydration.    Take acetaminophen or a nonsteroidal anti-inflammatory agent (NSAID), such as ibuprofen.  Treat a Troubled Nose Kindly    Breathe steam or heated humidified air to open blocked nasal passages.  a hot shower or use a vaporizer. Be careful not to get burned by the steam.    Saline nasal sprays and decongestant tablets help open a stuffy nose. Antihistamines can also help, but they can cause side effects such as drowsiness and drying of the eyes, nose, and mouth.  Soothe a Sore Throat and Cough    Gargle every 2 hours with 1/4 teaspoon of salt dissolved in 1/2 cup of warm water. Suck on throat lozenges and cough drops to moisten your throat.    Cough medicines are available but it is unclear how effective they actually are.    Take acetaminophen or an NSAID, such as ibuprofen.  Ease Digestive Problems    Put fluid back into your body. Take frequent sips of clear liquids such as water or broth. Do not drink beverages with a lot of sugar in them, such as juices and sodas. These can make diarrhea worse. Older children and adults can drink sports drinks.    As your appetite returns, you can resume your normal diet. Ask your doctor whether there are any foods you should avoid.     When to Call Your Doctor  When you first notice symptoms, ask your health care provider about antiviral medication. If taken soon after flu symptoms start, this can help you get well sooner. (Antibiotics should not be taken for colds or flu.) Also, call your doctor if you have any of the following symptoms or if you aren t feeling better after 7 days:    Shortness of breath    Pain or pressure in the chest or abdomen    Worsening symptoms, especially after a period of improvement    Fever of 100.4 F  (38.0 C) or higher, or fever that doesn t go  down with medication    Sudden dizziness or confusion    Severe or continued vomiting    Signs of dehydration, including extreme thirst, dark urine, infrequent urination, dry mouth    Spotted, red, or very sore throat     [id number] [employer] [facility], [address] [place] [address], [place] [address]. All rights reserved. This information is not intended as a substitute for professional medical care. Always follow your healthcare professional's instructions.          Risks, benefits, side effects and rationale for treatment plan fully discussed with the patient and understanding expressed.  BURTON CrandallBC  [facility]    Video-Visit Details  Type of service:  Video Visit    Video End Time:   15:22 on Doximity     Originating Location (pt. Location): Home    Distant Location (provider location):  [facility]    Platform used for Video Visit: Doximity     Follow-up: see Assessment & Plan         Skin Substitute Injection Text: The defect edges were debeveled with a #15 scalpel blade.  Given the location of the defect, shape of the defect and the proximity to free margins a skin substitute micronized graft was deemed most appropriate.  The entire vial contents were admixed with 3.0ccs of sterile saline and then injected subcutaneously throughout the entire wound bed.

## 2020-08-19 NOTE — RESULT ENCOUNTER NOTE
Please call her with these results, and mail a hard copy for their records.     Dear Sherley,  Both strep tests today are negative for you and Gabriella. Follow instructions on the AVS.   Thanks.  DARREN Pierre

## 2020-08-19 NOTE — PATIENT INSTRUCTIONS
1. Sore throat  Acute, stable  - Streptococcus A Rapid Scr w Reflx to PCR; Future  If positive, will treat with antibiotic sent to [facility]    2. Chronic streptococcal tonsillitis  Historical  - Streptococcus A Rapid Scr w Reflx to PCR; Future      If positive:  1. Antibiotics- take complete dosing  2. Throw out your toothbrush after 24 hours of antibiotic use  3. Children should remain at home for the first 24 hours on antibiotic    It negative:  Treat like viral infection: see below  Pharyngitis: Strep [Confirmed]    Your test for strep throat was positive. Strep throat is a contagious illness. It is spread by coughing, kissing or by touching others after touching your mouth or nose. Symptoms include throat pain which is worse with swallowing, aching all over, headache and fever. You will be treated with an antibiotic which should make you start to feel better within 1-2 days.  Home Care:    Rest at home and drink plenty of fluids to avoid dehydration.    No school or work for the first two days on antibiotics. You will not be contagious after this time and if you are feeling better, you can return to school or work.    Take your antibiotics for a full 10 days, even if you feel better after the first few days of treatment. This is very important to prevent heart or kidney disease that can result as a complication of untreated strep throat infection.    Children: Use acetaminophen (Tylenol) for fever, fussiness or discomfort. In infants over six months of age, you may use ibuprofen (Children's Motrin) instead of Tylenol. [NOTE: If your child has chronic liver or kidney disease or ever had a stomach ulcer or GI bleeding, talk with your doctor before using these medicines.] (Aspirin should never be used in anyone under 18 years of age who is ill with a fever. It may cause severe liver damage.)Adults: You may use acetaminophen (Tylenol) or ibuprofen (Motrin, Advil) to control pain or fever, unless another medicine  was prescribed for this. [NOTE: If you have chronic liver or kidney disease or ever had a stomach ulcer or GI bleeding, talk with your doctor before using these medicines.]    Throat lozenges or sprays (Chloraseptic and others) will reduce pain. Gargling with warm salt water will also reduce throat pain. Dissolve 1/2 teaspoon of salt in 1 glass of warm water. This is especially useful just before meals.  Follow Up  with your doctor or as directed by our staff if you are not improving over the next week.  Get Prompt Medical Attention  if any of the following occur:    Fever of 100.4 F (38 C) oral or higher, not better with fever medication    New or worsening ear pain, sinus pain or headache    Painful lumps in the back of your neck    Unable to swallow liquids or open your mouth wide due to throat pain    Trouble breathing or noisy breathing    Muffled voice    New rash    [id number] [facility], [address] [place] [address], [place] [address]. All rights reserved. This information is not intended as a substitute for professional medical care. Always follow your healthcare professional's instructions.        Adult Self-Care for Colds  Colds are caused by viruses. They can t be cured with antibiotics. However, you can relieve symptoms and support your body s efforts to heal itself. No matter which symptoms you have, be sure to drink plenty of fluids (water or clear soup); stop smoking and drinking alcohol; and get plenty of rest.   Understand a Fever    Take your temperature several times a day. If your fever is 100.4 F for more than a day, call your doctor.    Relax, lie down. Go to bed if you want. Just get off your feet and rest. Also, drink plenty of fluids to avoid dehydration.    Take acetaminophen or a nonsteroidal anti-inflammatory agent (NSAID), such as ibuprofen.  Treat a Troubled Nose Kindly    Breathe steam or heated humidified air to open blocked nasal passages.  a hot shower or use a vaporizer. Be  careful not to get burned by the steam.    Saline nasal sprays and decongestant tablets help open a stuffy nose. Antihistamines can also help, but they can cause side effects such as drowsiness and drying of the eyes, nose, and mouth.  Soothe a Sore Throat and Cough    Gargle every 2 hours with 1/4 teaspoon of salt dissolved in 1/2 cup of warm water. Suck on throat lozenges and cough drops to moisten your throat.    Cough medicines are available but it is unclear how effective they actually are.    Take acetaminophen or an NSAID, such as ibuprofen.  Ease Digestive Problems    Put fluid back into your body. Take frequent sips of clear liquids such as water or broth. Do not drink beverages with a lot of sugar in them, such as juices and sodas. These can make diarrhea worse. Older children and adults can drink sports drinks.    As your appetite returns, you can resume your normal diet. Ask your doctor whether there are any foods you should avoid.     When to Call Your Doctor  When you first notice symptoms, ask your health care provider about antiviral medication. If taken soon after flu symptoms start, this can help you get well sooner. (Antibiotics should not be taken for colds or flu.) Also, call your doctor if you have any of the following symptoms or if you aren t feeling better after 7 days:    Shortness of breath    Pain or pressure in the chest or abdomen    Worsening symptoms, especially after a period of improvement    Fever of 100.4 F  (38.0 C) or higher, or fever that doesn t go down with medication    Sudden dizziness or confusion    Severe or continued vomiting    Signs of dehydration, including extreme thirst, dark urine, infrequent urination, dry mouth    Spotted, red, or very sore throat     [phone] Paula Blake, [address] [place] [address], [place] [address]. All rights reserved. This information is not intended as a substitute for professional medical care. Always follow your healthcare  professional's instructions.

## 2020-12-21 ENCOUNTER — VIRTUAL VISIT (OUTPATIENT)
Dept: FAMILY MEDICINE | Facility: CLINIC | Age: 30
End: 2020-12-21
Payer: COMMERCIAL

## 2020-12-21 DIAGNOSIS — R19.7 DIARRHEA, UNSPECIFIED TYPE: Primary | ICD-10-CM

## 2020-12-21 PROCEDURE — 99213 OFFICE O/P EST LOW 20 MIN: CPT | Mod: 95 | Performed by: FAMILY MEDICINE

## 2020-12-21 NOTE — PROGRESS NOTES
"Sherley Quijano is a 30 year old female who is being evaluated via a billable video visit.      The patient has been notified of following:     \"This video visit will be conducted via a call between you and your physician/provider. We have found that certain health care needs can be provided without the need for an in-person physical exam.  This service lets us provide the care you need with a video conversation.  If a prescription is necessary we can send it directly to your pharmacy.  If lab work is needed we can place an order for that and you can then stop by our lab to have the test done at a later time.    Video visits are billed at different rates depending on your insurance coverage.  Please reach out to your insurance provider with any questions.    If during the course of the call the physician/provider feels a video visit is not appropriate, you will not be charged for this service.\"    Patient has given verbal consent for Video visit? Yes  How would you like to obtain your AVS? MyChart  If you are dropped from the video visit, the video invite should be resent to: Text to cell phone: [phone]  Will anyone else be joining your video visit? No      Video Start Time: 4:00    Subjective     Sherley Quijano is a 30 year old female who presents today via video visit for the following health issues:  Chief Complaint   Patient presents with     Diarrhea      Diarrhea off and on.    About three times a week.   Since August.   Normal pattern is daily bowel movement.   Has been laid off.   She has had  Diarrhea in the past.  Improved with stopping dairy.   2 years ago hospitalized for diarrhea.    No changes in diet.  Avoids dairy.    She feels she is eating healthier.   Can have constipation for a day or two after having diarrhea.  NO BM for a while, then hard.    No emesis.  Can get nausea on days she has diarrhea.   Triggering factors: ?  Family History: no colon cancer or IBD.    Can get a band of pain across " abdomen with the diarrhea.   No blood.    Some stomach pain related to diarrhea.  OK on non-diarrhea days.    Has tried TUMS, Pepto.  Not helping.     Takes flexeril prn.  albuteral inhaler prn.   Takes collagen for stiff hands.   Biotin.     No recent antibiotics.    No travel.    No tainted water.    No raw milk or dairy.     Diarrhea  Onset/Duration: couple months-for a few times a week   Description:       Consistency of stool: watery, runny, yellow, green and some brown.        Blood in stool: no       Number of loose stools past 24 hours: 5  Progression of Symptoms: waxing and waning  Accompanying signs and symptoms:       Fever: no       Nausea/Vomiting: YES- nausea. No vomiting        Abdominal pain: YES- Across the belly button area.        Weight loss: no       Episodes of constipation: YES  History   Ill contacts: no  Recent use of antibiotics: no  Recent travels: no  Recent medication-new or changes(Rx or OTC): no  Precipitating or alleviating factors: None  Therapies tried and outcome: Tums, MOM, Pepto Bismol        Patient Active Problem List   Diagnosis     Mild intermittent asthma     Dysfunction of eustachian tube     Attention deficit disorder     Bipolar affective disorder (H)     Adverse reaction to viral vaccines     CARDIOVASCULAR SCREENING; LDL GOAL LESS THAN 130     Migraine headache     Prenatal care, first pregnancy     SGA (small for gestational age)     Supervision of normal first pregnancy     IUGR (intrauterine growth restriction) affecting care of mother, third trimester, fetus 1     Vaginal delivery     Chronic seasonal allergic rhinitis due to pollen     Chronic allergic rhinitis due to animal hair and dander     Allergic rhinitis due to dust mite     Allergic conjunctivitis, bilateral      ROS:General: POSITIVE for:, energy a little low.    Resp: No coughing, wheezing or shortness of breath  CV: No chest pains or palpitations  GI: see above    : No urinary frequency or dysuria,  bladder or kidney problems  Musculoskeletal: No significant muscle or joint pains  Psychiatric: No problems with anxiety, depression or mental health    Vitals:  No vitals were obtained today due to virtual visit.    Physical Exam     GENERAL: Healthy, alert and no distress  EYES: Eyes grossly normal to inspection.  No discharge or erythema, or obvious scleral/conjunctival abnormalities.  RESP: No audible wheeze, cough, or visible cyanosis.  No visible retractions or increased work of breathing.    SKIN: Visible skin clear. No significant rash, abnormal pigmentation or lesions.  NEURO: Cranial nerves grossly intact.  Mentation and speech appropriate for age.  PSYCH: Mentation appears normal, affect normal/bright, judgement and insight intact, normal speech and appearance well-groomed.      ASSESSMENT:   (R19.7) Diarrhea, unspecified type  (primary encounter diagnosis)  Comment: Uncertain cause.  Could  Be irritable bowel.  R/O infectious cause.  R/O inflammatory bowel.  No recent antibiotic use.  Medications that are likely to cause diarrhea.  No known exposure to infectious disease.  Plan: Tissue transglutaminase jesse IgA and IgG, CBC         with platelets, Ova and Parasite Exam Routine,         Enteric Bacteria and Virus Panel by [facility] Stool,         Clostridium difficile toxin B PCR,         Cryptosporidium/Giardia Immunoassay     The first thing to do is to check some test to make sure there is not an infectious cause for the diarrhea.       Schedule an appointment with our  for a lab appointment.    We will do a couple blood tests for blood count and celiac disease.  You can  sample containers for some stool tests and instructions.     If the stool tests are all normal, I may order a colonoscopy to look up in your colon to see if there is inflammation causing the diarrhea.    In the meantime, there are couple of medications that you can try to slow the diarrhea.    Metamucil is an artificial  fiber medication which can help for both constipation and diarrhea by absorbing water and evening out the bowel movements.  Metamucil is a good source of fiber.  The sugar free Metamucil is a good choice as it has a higher amount of fiber.  Start with a heaping teaspoon once daily.  This is mixed with a glass of water.  The dose could be increased to one heaping teaspoon twice daily if needed.      The second thing to try is loperamide (Imodium).    Try loperamide (Imodium-brand name) 2 pills at first loose stool, then 1 after each loose stool up to maximum of 8 pills a day for symptom relief.  This over the counter medication is available without a prescription.         Video-Visit Details    Type of service:  Video Visit    Video End Time:4:13    Originating Location (pt. Location): Home    Distant Location (provider location):  [facility]     Platform used for Video Visit: Tippr

## 2020-12-21 NOTE — PATIENT INSTRUCTIONS
ASSESSMENT:   (R19.7) Diarrhea, unspecified type  (primary encounter diagnosis)  Comment: Uncertain cause.  Could  Be irritable bowel.  R/O infectious cause.  R/O inflammatory bowel.  No recent antibiotic use.  Medications that are likely to cause diarrhea.  No known exposure to infectious disease.  Plan: Tissue transglutaminase jesse IgA and IgG, CBC         with platelets, Ova and Parasite Exam Routine,         Enteric Bacteria and Virus Panel by [facility] Stool,         Clostridium difficile toxin B PCR,         Cryptosporidium/Giardia Immunoassay     The first thing to do is to check some test to make sure there is not an infectious cause for the diarrhea.       Schedule an appointment with our  for a lab appointment.    We will do a couple blood tests for blood count and celiac disease.  You can  sample containers for some stool tests and instructions.     If the stool tests are all normal, I may order a colonoscopy to look up in your colon to see if there is inflammation causing the diarrhea.    In the meantime, there are couple of medications that you can try to slow the diarrhea.    Metamucil is an artificial fiber medication which can help for both constipation and diarrhea by absorbing water and evening out the bowel movements.  Metamucil is a good source of fiber.  The sugar free Metamucil is a good choice as it has a higher amount of fiber.  Start with a heaping teaspoon once daily.  This is mixed with a glass of water.  The dose could be increased to one heaping teaspoon twice daily if needed.      The second thing to try is loperamide (Imodium).    Try loperamide (Imodium-brand name) 2 pills at first loose stool, then 1 after each loose stool up to maximum of 8 pills a day for symptom relief.  This over the counter medication is available without a prescription.

## 2021-01-07 DIAGNOSIS — R19.7 DIARRHEA, UNSPECIFIED TYPE: ICD-10-CM

## 2021-01-07 LAB
ERYTHROCYTE [DISTWIDTH] IN BLOOD BY AUTOMATED COUNT: 11.9 % (ref 10–15)
HCT VFR BLD AUTO: 39.9 % (ref 35–47)
HGB BLD-MCNC: 13.3 G/DL (ref 11.7–15.7)
MCH RBC QN AUTO: 30.8 PG (ref 26.5–33)
MCHC RBC AUTO-ENTMCNC: 33.3 G/DL (ref 31.5–36.5)
MCV RBC AUTO: 92 FL (ref 78–100)
PLATELET # BLD AUTO: 291 10E9/L (ref 150–450)
RBC # BLD AUTO: 4.32 10E12/L (ref 3.8–5.2)
WBC # BLD AUTO: 8.6 10E9/L (ref 4–11)

## 2021-01-07 PROCEDURE — 85027 COMPLETE CBC AUTOMATED: CPT | Performed by: FAMILY MEDICINE

## 2021-01-07 PROCEDURE — 36415 COLL VENOUS BLD VENIPUNCTURE: CPT | Performed by: FAMILY MEDICINE

## 2021-01-07 PROCEDURE — 83516 IMMUNOASSAY NONANTIBODY: CPT | Performed by: FAMILY MEDICINE

## 2021-01-07 PROCEDURE — 83516 IMMUNOASSAY NONANTIBODY: CPT | Mod: 59 | Performed by: FAMILY MEDICINE

## 2021-01-10 LAB
TTG IGA SER-ACNC: <1 U/ML
TTG IGG SER-ACNC: <1 U/ML

## 2021-01-10 NOTE — RESULT ENCOUNTER NOTE
TAMI Lepe,   Your test for celiac disease is negative.   Your blood counts including the white blood cells, red blood cells and platelets are all normal.     WON WILL MD

## 2021-01-12 DIAGNOSIS — R19.7 DIARRHEA, UNSPECIFIED TYPE: ICD-10-CM

## 2021-01-12 LAB
C DIFF TOX B STL QL: NEGATIVE
SPECIMEN SOURCE: NORMAL

## 2021-01-12 PROCEDURE — 87506 IADNA-DNA/RNA PROBE TQ 6-11: CPT | Performed by: FAMILY MEDICINE

## 2021-01-12 PROCEDURE — 87209 SMEAR COMPLEX STAIN: CPT | Performed by: FAMILY MEDICINE

## 2021-01-12 PROCEDURE — 36415 COLL VENOUS BLD VENIPUNCTURE: CPT | Performed by: FAMILY MEDICINE

## 2021-01-12 PROCEDURE — 87177 OVA AND PARASITES SMEARS: CPT | Performed by: FAMILY MEDICINE

## 2021-01-12 PROCEDURE — 87328 CRYPTOSPORIDIUM AG IA: CPT | Performed by: FAMILY MEDICINE

## 2021-01-12 PROCEDURE — 87493 C DIFF AMPLIFIED PROBE: CPT | Performed by: FAMILY MEDICINE

## 2021-01-12 PROCEDURE — 87329 GIARDIA AG IA: CPT | Performed by: FAMILY MEDICINE

## 2021-01-13 LAB
C COLI+JEJUNI+LARI FUSA STL QL NAA+PROBE: NOT DETECTED
C PARVUM AG STL QL IA: NEGATIVE
EC STX1 GENE STL QL NAA+PROBE: NOT DETECTED
EC STX2 GENE STL QL NAA+PROBE: NOT DETECTED
ENTERIC PATHOGEN COMMENT: NORMAL
G LAMBLIA AG STL QL IA: NEGATIVE
NOROV GI+II ORF1-ORF2 JNC STL QL NAA+PR: NOT DETECTED
O+P STL MICRO: NORMAL
O+P STL MICRO: NORMAL
RVA NSP5 STL QL NAA+PROBE: NOT DETECTED
SALMONELLA SP RPOD STL QL NAA+PROBE: NOT DETECTED
SHIGELLA SP+EIEC IPAH STL QL NAA+PROBE: NOT DETECTED
SPECIMEN SOURCE: NORMAL
SPECIMEN SOURCE: NORMAL
V CHOL+PARA RFBL+TRKH+TNAA STL QL NAA+PR: NOT DETECTED
Y ENTERO RECN STL QL NAA+PROBE: NOT DETECTED

## 2021-01-14 NOTE — RESULT ENCOUNTER NOTE
Hi Sherley,  All the stool tests are normal.  There is no sign of infection that is causing your diarrhea.   PLAN: Try the Metamucil and loperamide (Imodium).  If diarrhea persists, let me know and I will order a colonoscopy.   WON WILL MD

## 2021-02-02 ENCOUNTER — TELEPHONE (OUTPATIENT)
Dept: FAMILY MEDICINE | Facility: CLINIC | Age: 31
End: 2021-02-02

## 2021-02-02 NOTE — TELEPHONE ENCOUNTER
Patient Quality Outreach      Summary:    Patient has the following on her problem list/HM:     Asthma review       ACT Total Scores 1/13/2020   ACT TOTAL SCORE -   ASTHMA ER VISITS -   ASTHMA HOSPITALIZATIONS -   ACT TOTAL SCORE (Goal Greater than or Equal to 20) 25   In the past 12 months, how many times did you visit the emergency room for your asthma without being admitted to the hospital? 0   In the past 12 months, how many times were you hospitalized overnight because of your asthma? 0          Patient is due/failing the following:   ACT needed    Type of outreach:    Sent [facility] message.    Questions for provider review:    None                                                                                                                                     Rosanna Dao MA

## 2021-04-03 ENCOUNTER — HEALTH MAINTENANCE LETTER (OUTPATIENT)
Age: 31
End: 2021-04-03

## 2021-11-02 ENCOUNTER — OFFICE VISIT (OUTPATIENT)
Dept: FAMILY MEDICINE | Facility: CLINIC | Age: 31
End: 2021-11-02
Payer: COMMERCIAL

## 2021-11-02 VITALS
SYSTOLIC BLOOD PRESSURE: 124 MMHG | DIASTOLIC BLOOD PRESSURE: 68 MMHG | HEIGHT: 67 IN | BODY MASS INDEX: 28.79 KG/M2 | OXYGEN SATURATION: 99 % | WEIGHT: 183.4 LBS | TEMPERATURE: 97.6 F | HEART RATE: 78 BPM

## 2021-11-02 DIAGNOSIS — T78.1XXA REACTION TO FOOD, INITIAL ENCOUNTER: ICD-10-CM

## 2021-11-02 DIAGNOSIS — J45.20 MILD INTERMITTENT ASTHMA WITHOUT COMPLICATION: ICD-10-CM

## 2021-11-02 DIAGNOSIS — R53.83 FATIGUE, UNSPECIFIED TYPE: ICD-10-CM

## 2021-11-02 DIAGNOSIS — J30.1 CHRONIC SEASONAL ALLERGIC RHINITIS DUE TO POLLEN: ICD-10-CM

## 2021-11-02 DIAGNOSIS — Z91.040 LATEX SENSITIVITY: ICD-10-CM

## 2021-11-02 DIAGNOSIS — J30.81 CHRONIC ALLERGIC RHINITIS DUE TO ANIMAL HAIR AND DANDER: ICD-10-CM

## 2021-11-02 DIAGNOSIS — L65.9 HAIR LOSS: ICD-10-CM

## 2021-11-02 DIAGNOSIS — Z00.00 ROUTINE GENERAL MEDICAL EXAMINATION AT A HEALTH CARE FACILITY: Primary | ICD-10-CM

## 2021-11-02 DIAGNOSIS — R19.5 LOOSE STOOLS: ICD-10-CM

## 2021-11-02 DIAGNOSIS — G56.03 BILATERAL CARPAL TUNNEL SYNDROME: ICD-10-CM

## 2021-11-02 LAB
ALBUMIN SERPL-MCNC: 3.9 G/DL (ref 3.4–5)
ALP SERPL-CCNC: 61 U/L (ref 40–150)
ALT SERPL W P-5'-P-CCNC: 18 U/L (ref 0–50)
ANION GAP SERPL CALCULATED.3IONS-SCNC: 5 MMOL/L (ref 3–14)
AST SERPL W P-5'-P-CCNC: 11 U/L (ref 0–45)
BILIRUB SERPL-MCNC: 0.3 MG/DL (ref 0.2–1.3)
BUN SERPL-MCNC: 18 MG/DL (ref 7–30)
CALCIUM SERPL-MCNC: 9 MG/DL (ref 8.5–10.1)
CHLORIDE BLD-SCNC: 105 MMOL/L (ref 94–109)
CO2 SERPL-SCNC: 28 MMOL/L (ref 20–32)
CREAT SERPL-MCNC: 0.89 MG/DL (ref 0.52–1.04)
ERYTHROCYTE [DISTWIDTH] IN BLOOD BY AUTOMATED COUNT: 12.2 % (ref 10–15)
FERRITIN SERPL-MCNC: 45 NG/ML (ref 12–150)
FOLATE SERPL-MCNC: 16.3 NG/ML
GFR SERPL CREATININE-BSD FRML MDRD: 87 ML/MIN/1.73M2
GLUCOSE BLD-MCNC: 98 MG/DL (ref 70–99)
HCT VFR BLD AUTO: 42.2 % (ref 35–47)
HGB BLD-MCNC: 14.6 G/DL (ref 11.7–15.7)
IRON SATN MFR SERPL: 20 % (ref 15–46)
IRON SERPL-MCNC: 75 UG/DL (ref 35–180)
MCH RBC QN AUTO: 31.7 PG (ref 26.5–33)
MCHC RBC AUTO-ENTMCNC: 34.6 G/DL (ref 31.5–36.5)
MCV RBC AUTO: 92 FL (ref 78–100)
PLATELET # BLD AUTO: 293 10E3/UL (ref 150–450)
POTASSIUM BLD-SCNC: 3.7 MMOL/L (ref 3.4–5.3)
PROT SERPL-MCNC: 7.2 G/DL (ref 6.8–8.8)
RBC # BLD AUTO: 4.6 10E6/UL (ref 3.8–5.2)
SODIUM SERPL-SCNC: 138 MMOL/L (ref 133–144)
TIBC SERPL-MCNC: 376 UG/DL (ref 240–430)
TSH SERPL DL<=0.005 MIU/L-ACNC: 2.7 MU/L (ref 0.4–4)
WBC # BLD AUTO: 7.8 10E3/UL (ref 4–11)

## 2021-11-02 PROCEDURE — 36415 COLL VENOUS BLD VENIPUNCTURE: CPT | Performed by: NURSE PRACTITIONER

## 2021-11-02 PROCEDURE — 82306 VITAMIN D 25 HYDROXY: CPT | Performed by: NURSE PRACTITIONER

## 2021-11-02 PROCEDURE — 85027 COMPLETE CBC AUTOMATED: CPT | Performed by: NURSE PRACTITIONER

## 2021-11-02 PROCEDURE — 84443 ASSAY THYROID STIM HORMONE: CPT | Performed by: NURSE PRACTITIONER

## 2021-11-02 PROCEDURE — 99213 OFFICE O/P EST LOW 20 MIN: CPT | Mod: 25 | Performed by: NURSE PRACTITIONER

## 2021-11-02 PROCEDURE — G0145 SCR C/V CYTO,THINLAYER,RESCR: HCPCS | Performed by: NURSE PRACTITIONER

## 2021-11-02 PROCEDURE — 99395 PREV VISIT EST AGE 18-39: CPT | Performed by: NURSE PRACTITIONER

## 2021-11-02 PROCEDURE — 82746 ASSAY OF FOLIC ACID SERUM: CPT | Performed by: NURSE PRACTITIONER

## 2021-11-02 PROCEDURE — 82728 ASSAY OF FERRITIN: CPT | Performed by: NURSE PRACTITIONER

## 2021-11-02 PROCEDURE — 83550 IRON BINDING TEST: CPT | Performed by: NURSE PRACTITIONER

## 2021-11-02 PROCEDURE — 87624 HPV HI-RISK TYP POOLED RSLT: CPT | Performed by: NURSE PRACTITIONER

## 2021-11-02 PROCEDURE — 80053 COMPREHEN METABOLIC PANEL: CPT | Performed by: NURSE PRACTITIONER

## 2021-11-02 RX ORDER — EPINEPHRINE 0.3 MG/.3ML
0.3 INJECTION SUBCUTANEOUS ONCE
Qty: 0.6 ML | Refills: 1 | Status: SHIPPED | OUTPATIENT
Start: 2021-11-02 | End: 2021-11-02

## 2021-11-02 ASSESSMENT — ENCOUNTER SYMPTOMS
PARESTHESIAS: 0
HEARTBURN: 1
CHILLS: 0
BREAST MASS: 0
DYSURIA: 0
WEAKNESS: 0
COUGH: 0
MYALGIAS: 0
DIZZINESS: 0
HEMATOCHEZIA: 0
EYE PAIN: 0
CHILLS: 1
ABDOMINAL PAIN: 1
PALPITATIONS: 0
CHANGE IN BOWEL HABIT: 1
FEVER: 0
HEMATURIA: 0
DIARRHEA: 1
ARTHRALGIAS: 0
FATIGUE: 1
SHORTNESS OF BREATH: 0
CONSTIPATION: 1
NERVOUS/ANXIOUS: 0
DIAPHORESIS: 1
SORE THROAT: 0
HEADACHES: 0
JOINT SWELLING: 0
ARTHRALGIAS: 1
FREQUENCY: 0
NAUSEA: 1

## 2021-11-02 ASSESSMENT — PAIN SCALES - GENERAL: PAINLEVEL: MILD PAIN (2)

## 2021-11-02 ASSESSMENT — MIFFLIN-ST. JEOR: SCORE: 1584.65

## 2021-11-02 NOTE — PATIENT INSTRUCTIONS
Labs today-we will notify you with those results    Schedule your colonoscopy     Schedule your EMG and ortho evaluation    Preventive Health Recommendations  Female Ages 26 - 39  Yearly exam:   See your health care provider every year in order to    Review health changes.     Discuss preventive care.      Review your medicines if you your doctor has prescribed any.    Until age 30: Get a Pap test every three years (more often if you have had an abnormal result).    After age 30: Talk to your doctor about whether you should have a Pap test every 3 years or have a Pap test with HPV screening every 5 years.   You do not need a Pap test if your uterus was removed (hysterectomy) and you have not had cancer.  You should be tested each year for STDs (sexually transmitted diseases), if you're at risk.   Talk to your provider about how often to have your cholesterol checked.  If you are at risk for diabetes, you should have a diabetes test (fasting glucose).  Shots: Get a flu shot each year. Get a tetanus shot every 10 years.   Nutrition:     Eat at least 5 servings of fruits and vegetables each day.    Eat whole-grain bread, whole-wheat pasta and brown rice instead of white grains and rice.    Get adequate Calcium and Vitamin D.     Lifestyle    Exercise at least 150 minutes a week (30 minutes a day, 5 days of the week). This will help you control your weight and prevent disease.    Limit alcohol to one drink per day.    No smoking.     Wear sunscreen to prevent skin cancer.    See your dentist every six months for an exam and cleaning.

## 2021-11-02 NOTE — PROGRESS NOTES
SUBJECTIVE:   CC: Sherley Quijano is an 30 year old woman who presents for preventive health visit.       Patient has been advised of split billing requirements and indicates understanding: Yes  Healthy Habits:     Getting at least 3 servings of Calcium per day:  Yes    Bi-annual eye exam:  Yes    Dental care twice a year:  Yes    Sleep apnea or symptoms of sleep apnea:  Daytime drowsiness    Diet:  Other    Frequency of exercise:  2-3 days/week    Duration of exercise:  15-30 minutes    Taking medications regularly:  No    Barriers to taking medications:  Problems remembering to take them    Medication side effects:  Not applicable    PHQ-2 Total Score: 2    Additional concerns today:  No  Diarrhea  This is a chronic problem. The current episode started more than 1 year ago. The problem occurs 2 to 4 times per day. The problem has been gradually worsening. Associated symptoms include abdominal pain, arthralgias, a change in bowel habit, chills, diaphoresis, fatigue and nausea. Pertinent negatives include no chest pain, congestion, coughing, fever, headaches, joint swelling, myalgias, rash, sore throat or weakness. Nothing aggravates the symptoms. She has tried eating and lying down for the symptoms. The treatment provided no relief.    has also tried staying active/excercise. Also wants to report hair loss. About 1/2 of her hair has been lost.     Dairy free diet-lactose intolerant   Tried gluten free, stopped drinking coffee and alcohol and it did not help  Significant hair loss, brittle nails, fatigue    Joint Pain    Onset: 6 years ago    Description:   Location: left wrist and right wrist; left worse than right   Character: numb and tingling from 5th digit to elbow. Lost strength, pain in wrist, feels like she gets an electric shock at times when doing the motion of using tools.     Intensity: moderate    Progression of Symptoms: same    Accompanying Signs & Symptoms:  Other symptoms: numbness, tingling and  weakness of hands    History:   Previous similar pain: no       Precipitating factors:   Trauma or overuse: YES    Alleviating factors:  Improved by: rest/inactivity, ice, immobilization, stretching and support wrap with temporary relief    Therapies Tried and outcome: tylenol and ibuprofen have not been effective.     Entire hand numb when walking up  History of carpal tunnel-slowly worsening  Cannot open jars  Sleeps in splints    Today's PHQ-2 Score:   PHQ-2 (  [employer]) 2021   Q1: Little interest or pleasure in doing things 1   Q2: Feeling down, depressed or hopeless 1   PHQ-2 Score 2   Q1: Little interest or pleasure in doing things Several days   Q2: Feeling down, depressed or hopeless Several days   PHQ-2 Score 2       Abuse: Current or Past (Physical, Sexual or Emotional) - No  Do you feel safe in your environment? Yes    Have you ever done Advance Care Planning? (For example, a Health Directive, POLST, or a discussion with a medical provider or your loved ones about your wishes): No, advance care planning information given to patient to review.  Patient declined advance care planning discussion at this time.    Social History     Tobacco Use     Smoking status: Former Smoker     Packs/day: 0.25     Types: Cigarettes     Quit date: 10/2017     Years since quittin.0     Smokeless tobacco: Never Used     Tobacco comment: using e-cig daily   Substance Use Topics     Alcohol use: Yes     Alcohol/week: 0.0 standard drinks     Comment: rare     If you drink alcohol do you typically have >3 drinks per day or >7 drinks per week? No    Alcohol Use 2021   Prescreen: >3 drinks/day or >7 drinks/week? No   Prescreen: >3 drinks/day or >7 drinks/week? -   No flowsheet data found.    Reviewed orders with patient.  Reviewed health maintenance and updated orders accordingly - Yes  Labs reviewed in EPIC    Breast Cancer Screening:    FHS-7:   Breast CA Risk Assessment (FHS-7) 2021   Did any of your  first-degree relatives have breast or ovarian cancer? Yes   Did any of your relatives have bilateral breast cancer? No   Did any man in your family have breast cancer? No   Did any woman in your family have breast and ovarian cancer? Yes   Did any woman in your family have breast cancer before age 50 y? No   Do you have 2 or more relatives with breast and/or ovarian cancer? Yes   Do you have 2 or more relatives with breast and/or bowel cancer? Yes     Mother with breast cancer- 60 YO  Both grandmothers with breast cancer. 56 and 61 yo  Two maternal aunts with breast cancer-70 and 65, genetic testing negative    Patient under 40 years of age: Routine Mammogram Screening not recommended.   Pertinent mammograms are reviewed under the imaging tab.    History of abnormal Pap smear: NO - age 30-65 PAP every 5 years with negative HPV co-testing recommended  PAP / HPV Latest Ref Rng & Units 11/2/2021 9/22/2016 3/28/2012   PAP   Negative for Intraepithelial Lesion or Malignancy (NILM) - -   PAP (Historical) - - NIL NIL   HPV16 NEG - Negative -   HPV18 NEG - Negative -   HRHPV NEG - Negative -     Reviewed and updated as needed this visit by clinical staff  Tobacco  Allergies  Meds  Problems  Med Hx  Surg Hx  Fam Hx  Soc Hx          Reviewed and updated as needed this visit by Provider  Tobacco  Allergies  Meds  Problems  Med Hx  Surg Hx  Fam Hx             Review of Systems   Constitutional: Positive for chills, diaphoresis and fatigue. Negative for fever.   HENT: Negative for congestion, ear pain, hearing loss and sore throat.    Eyes: Negative for pain and visual disturbance.   Respiratory: Negative for cough and shortness of breath.    Cardiovascular: Negative for chest pain, palpitations and peripheral edema.   Gastrointestinal: Positive for abdominal pain, change in bowel habit, constipation, diarrhea, heartburn and nausea. Negative for hematochezia.   Breasts:  Negative for tenderness, breast mass and  "discharge.   Genitourinary: Negative for dysuria, frequency, genital sores, hematuria, pelvic pain, urgency, vaginal bleeding and vaginal discharge.   Musculoskeletal: Positive for arthralgias. Negative for joint swelling and myalgias.   Skin: Negative for rash.   Neurological: Negative for dizziness, weakness, headaches and paresthesias.   Psychiatric/Behavioral: Negative for mood changes. The patient is not nervous/anxious.       OBJECTIVE:   /68 (BP Location: Right arm, Patient Position: Sitting, Cuff Size: Adult Regular)   Pulse 78   Temp 97.6  F (36.4  C) (Tympanic)   Ht 1.702 m (5' 7.01\")   Wt 83.2 kg (183 lb 6.4 oz)   LMP 11/02/2021 (Exact Date)   SpO2 99%   BMI 28.72 kg/m    Physical Exam  GENERAL: healthy, alert and no distress  EYES: Eyes grossly normal to inspection, PERRL and conjunctivae and sclerae normal  HENT: ear canals and TM's normal, nose and mouth without ulcers or lesions  NECK: no adenopathy, no asymmetry, masses, or scars and thyroid normal to palpation  RESP: lungs clear to auscultation - no rales, rhonchi or wheezes  BREAST: normal without masses, tenderness or nipple discharge and no palpable axillary masses or adenopathy  CV: regular rate and rhythm, normal S1 S2, no S3 or S4, no murmur, click or rub, no peripheral edema and peripheral pulses strong  ABDOMEN: soft, nontender, no hepatosplenomegaly, no masses and bowel sounds normal   (female): normal female external genitalia, normal urethral meatus, vaginal mucosa pink, moist, well rugated, and normal cervix/adnexa/uterus without masses or discharge  MS: extremities normal- no gross deformities noted, positive Phalen's bilaterally  SKIN: no suspicious lesions or rashes  NEURO: Normal strength and tone, mentation intact and speech normal  PSYCH: mentation appears normal, affect normal/bright    Diagnostic Test Results:  Results for orders placed or performed in visit on 11/02/21   Folate     Status: Normal   Result Value " Ref Range    Folic Acid 16.3 >=5.4 ng/mL   Vitamin D Deficiency     Status: Normal   Result Value Ref Range    Vitamin D, Total (25-Hydroxy) 24 20 - 75 ug/L    Narrative    Season, race, dietary intake, and treatment affect the concentration of 25-hydroxy-Vitamin D. Values may decrease during winter months and increase during summer months. Values 20-29 ug/L may indicate Vitamin D insufficiency and values <20 ug/L may indicate Vitamin D deficiency.    Vitamin D determination is routinely performed by an immunoassay specific for 25 hydroxyvitamin D3.  If an individual is on vitamin D2(ergocalciferol) supplementation, please specify 25 OH vitamin D2 and D3 level determination by LCMSMS test [id number].     Ferritin     Status: Normal   Result Value Ref Range    Ferritin 45 12 - 150 ng/mL   Iron and iron binding capacity     Status: Normal   Result Value Ref Range    Iron 75 35 - 180 ug/dL    Iron Binding Capacity 376 240 - 430 ug/dL    Iron Sat Index 20 15 - 46 %   CBC with platelets     Status: Normal   Result Value Ref Range    WBC Count 7.8 4.0 - 11.0 10e3/uL    RBC Count 4.60 3.80 - 5.20 10e6/uL    Hemoglobin 14.6 11.7 - 15.7 g/dL    Hematocrit 42.2 35.0 - 47.0 %    MCV 92 78 - 100 fL    MCH 31.7 26.5 - 33.0 pg    MCHC 34.6 31.5 - 36.5 g/dL    RDW 12.2 10.0 - 15.0 %    Platelet Count 293 150 - 450 10e3/uL   Comprehensive metabolic panel (BMP + Alb, Alk Phos, ALT, AST, Total. Bili, TP)     Status: Normal   Result Value Ref Range    Sodium 138 133 - 144 mmol/L    Potassium 3.7 3.4 - 5.3 mmol/L    Chloride 105 94 - 109 mmol/L    Carbon Dioxide (CO2) 28 20 - 32 mmol/L    Anion Gap 5 3 - 14 mmol/L    Urea Nitrogen 18 7 - 30 mg/dL    Creatinine 0.89 0.52 - 1.04 mg/dL    Calcium 9.0 8.5 - 10.1 mg/dL    Glucose 98 70 - 99 mg/dL    Alkaline Phosphatase 61 40 - 150 U/L    AST 11 0 - 45 U/L    ALT 18 0 - 50 U/L    Protein Total 7.2 6.8 - 8.8 g/dL    Albumin 3.9 3.4 - 5.0 g/dL    Bilirubin Total 0.3 0.2 - 1.3 mg/dL    GFR  Estimate 87 >60 mL/min/1.73m2   TSH with free T4 reflex     Status: Normal   Result Value Ref Range    TSH 2.70 0.40 - 4.00 mU/L   Gynecologic Cytology (PAP Smear)     Status: None   Result Value Ref Range    Interpretation        Negative for Intraepithelial Lesion or Malignancy (NILM)    Specimen Adequacy       Satisfactory for evaluation, endocervical/transformation zone component present    Clinical Information       none      LMP/Menopause Date       11/2/2021      HPV Reflex Yes regardless of result     Previous Abnormal?       No      Performing Labs       The technical component of this testing was completed at [facility]         ASSESSMENT/PLAN:   (Z00.00) Routine general medical examination at a health care facility  (primary encounter diagnosis)  Comment:   Plan: Gynecologic Cytology (PAP Smear), HPV Hold (Lab        Only)            (J30.81) Chronic allergic rhinitis due to animal hair and dander  Comment: EpiPen refilled for allergic reaction   Plan: EPINEPHrine (AUVI-Q) 0.3 MG/0.3ML injection         2-pack          (J30.1) Chronic seasonal allergic rhinitis due to pollen  Comment: per above.   Plan: EPINEPHrine (AUVI-Q) 0.3 MG/0.3ML injection         2-pack          (J45.20) Mild intermittent asthma without complication  Comment: stable, per above.   Plan: EPINEPHrine (AUVI-Q) 0.3 MG/0.3ML injection         2-pack          (Z91.040) Latex sensitivity  Comment: per above.   Plan: EPINEPHrine (AUVI-Q) 0.3 MG/0.3ML injection         2-pack          (T78.1XXA) Reaction to food, initial encounter  Comment: per above.   Plan: EPINEPHrine (AUVI-Q) 0.3 MG/0.3ML injection         2-pack           (R53.83) Fatigue, unspecified type  Comment:   Plan: TSH with free T4 reflex, Comprehensive         metabolic panel (BMP + Alb, Alk Phos, ALT, AST,        Total. Bili, TP), CBC with platelets, Iron and         iron binding capacity, Ferritin, Vitamin D       "   Deficiency, Folate            (L65.9) Hair loss  Comment: labs were unremarkable for the cause of fatigue and hair loss.  Vitamin D was in the insufficient range, supplement with 2000 international unit(s) daily of vitamin D3 daily  Plan: TSH with free T4 reflex, Comprehensive         metabolic panel (BMP + Alb, Alk Phos, ALT, AST,        Total. Bili, TP), CBC with platelets, Iron and         iron binding capacity, Ferritin, Vitamin D         Deficiency, Folate          (R19.5) Loose stools  Comment: labs unremarkable as well as previous work up. Plan for colonoscopy.  Plan: TSH with free T4 reflex, Comprehensive         metabolic panel (BMP + Alb, Alk Phos, ALT, AST,        Total. Bili, TP), CBC with platelets, Adult         Gastro Ref - Procedure Only          (G56.03) Bilateral carpal tunnel syndrome  Comment: EMG and hand surgeon referrals placed for ongoing and worsening symptoms.   Plan: Orthopedic  Referral, Orthopedic          Referral        Patient has been advised of split billing requirements and indicates understanding: Yes  COUNSELING:  Reviewed preventive health counseling, as reflected in patient instructions    Estimated body mass index is 28.72 kg/m  as calculated from the following:    Height as of this encounter: 1.702 m (5' 7.01\").    Weight as of this encounter: 83.2 kg (183 lb 6.4 oz).        She reports that she quit smoking about 4 years ago. Her smoking use included cigarettes. She smoked 0.25 packs per day. She has never used smokeless tobacco.      Counseling Resources:  ATP IV Guidelines  Pooled Cohorts Equation Calculator  Breast Cancer Risk Calculator  BRCA-Related Cancer Risk Assessment: FHS-7 Tool  FRAX Risk Assessment  ICSI Preventive Guidelines  Dietary Guidelines for Americans, 2010  USDA's MyPlate  ASA Prophylaxis  Lung CA Screening    MASHA Monroy [facility]  [facility]  "

## 2021-11-03 DIAGNOSIS — Z11.59 ENCOUNTER FOR SCREENING FOR OTHER VIRAL DISEASES: ICD-10-CM

## 2021-11-03 LAB — DEPRECATED CALCIDIOL+CALCIFEROL SERPL-MC: 24 UG/L (ref 20–75)

## 2021-11-04 LAB
BKR LAB AP GYN ADEQUACY: NORMAL
BKR LAB AP GYN INTERPRETATION: NORMAL
BKR LAB AP HPV REFLEX: NORMAL
BKR LAB AP LMP: NORMAL
BKR LAB AP PREVIOUS ABNORMAL: NORMAL
PATH REPORT.COMMENTS IMP SPEC: NORMAL
PATH REPORT.RELEVANT HX SPEC: NORMAL

## 2021-11-08 LAB
HUMAN PAPILLOMA VIRUS 16 DNA: NEGATIVE
HUMAN PAPILLOMA VIRUS 18 DNA: NEGATIVE
HUMAN PAPILLOMA VIRUS FINAL DIAGNOSIS: NORMAL
HUMAN PAPILLOMA VIRUS OTHER HR: NEGATIVE

## 2021-11-14 ENCOUNTER — LAB (OUTPATIENT)
Dept: LAB | Facility: CLINIC | Age: 31
End: 2021-11-14
Payer: COMMERCIAL

## 2021-11-14 DIAGNOSIS — Z11.59 ENCOUNTER FOR SCREENING FOR OTHER VIRAL DISEASES: ICD-10-CM

## 2021-11-14 PROCEDURE — U0003 INFECTIOUS AGENT DETECTION BY NUCLEIC ACID (DNA OR RNA); SEVERE ACUTE RESPIRATORY SYNDROME CORONAVIRUS 2 (SARS-COV-2) (CORONAVIRUS DISEASE [COVID-19]), AMPLIFIED PROBE TECHNIQUE, MAKING USE OF HIGH THROUGHPUT TECHNOLOGIES AS DESCRIBED BY CMS-2020-01-R: HCPCS

## 2021-11-14 PROCEDURE — U0005 INFEC AGEN DETEC AMPLI PROBE: HCPCS

## 2021-11-15 ENCOUNTER — ANESTHESIA EVENT (OUTPATIENT)
Dept: GASTROENTEROLOGY | Facility: CLINIC | Age: 31
End: 2021-11-15
Payer: COMMERCIAL

## 2021-11-15 LAB — SARS-COV-2 RNA RESP QL NAA+PROBE: NEGATIVE

## 2021-11-15 RX ORDER — BUDESONIDE 0.5 MG/2ML
INHALANT ORAL
COMMUNITY
Start: 2020-02-21

## 2021-11-15 ASSESSMENT — LIFESTYLE VARIABLES: TOBACCO_USE: 1

## 2021-11-16 NOTE — ANESTHESIA PREPROCEDURE EVALUATION
Anesthesia Pre-Procedure Evaluation    Patient: Sherley Quijano   MRN: [id number] : 1990        Preoperative Diagnosis: Loose stools [R19.5]    Procedure : Procedure(s):  COLONOSCOPY          Past Medical History:   Diagnosis Date     Chickenpox      Chlamydia     age 18      Past Surgical History:   Procedure Laterality Date     D & C  2006    TAB     MOUTH SURGERY      wisdom teeth      Allergies   Allergen Reactions     Kiwi Anaphylaxis     Throat swelling, difficulty breathing     Lanolin Hives     Latex Hives     Penicillins Rash      Social History     Tobacco Use     Smoking status: Former Smoker     Packs/day: 0.25     Types: Cigarettes     Quit date: 10/2017     Years since quittin.1     Smokeless tobacco: Never Used     Tobacco comment: using e-cig daily   Substance Use Topics     Alcohol use: Yes     Alcohol/week: 0.0 standard drinks     Comment: rare      Wt Readings from Last 1 Encounters:   21 83.2 kg (183 lb 6.4 oz)        Anesthesia Evaluation   Pt has had prior anesthetic.         ROS/MED HX  ENT/Pulmonary:     (+) allergic rhinitis, tobacco use, Past use, Mild Persistent, asthma     Neurologic:     (+) migraines,     Cardiovascular:  - neg cardiovascular ROS     METS/Exercise Tolerance:     Hematologic:  - neg hematologic  ROS     Musculoskeletal:  - neg musculoskeletal ROS     GI/Hepatic:  - neg GI/hepatic ROS     Renal/Genitourinary:  - neg Renal ROS     Endo:  - neg endo ROS     Psychiatric/Substance Use:     (+) psychiatric history bipolar     Infectious Disease:  - neg infectious disease ROS     Malignancy:  - neg malignancy ROS     Other:  - neg other ROS          Physical Exam    Airway  airway exam normal      Mallampati: II   TM distance: > 3 FB   Neck ROM: full   Mouth opening: > 3 cm    Respiratory Devices and Support         Dental  no notable dental history         Cardiovascular   cardiovascular exam normal          Pulmonary   pulmonary exam normal                 OUTSIDE LABS:  CBC:   Lab Results   Component Value Date    WBC 7.8 11/02/2021    WBC 8.6 01/07/2021    HGB 14.6 11/02/2021    HGB 13.3 01/07/2021    HCT 42.2 11/02/2021    HCT 39.9 01/07/2021     11/02/2021     01/07/2021     BMP:   Lab Results   Component Value Date     11/02/2021     03/21/2019    POTASSIUM 3.7 11/02/2021    POTASSIUM 3.8 03/21/2019    CHLORIDE 105 11/02/2021    CHLORIDE 107 03/21/2019    CO2 28 11/02/2021    CO2 28 03/21/2019    BUN 18 11/02/2021    BUN 16 03/21/2019    CR 0.89 11/02/2021    CR 0.82 03/21/2019    GLC 98 11/02/2021    GLC 86 03/21/2019     COAGS:   Lab Results   Component Value Date    INR 1.08 02/27/2006     POC:   Lab Results   Component Value Date    HCG Negative 02/25/2019     HEPATIC:   Lab Results   Component Value Date    ALBUMIN 3.9 11/02/2021    PROTTOTAL 7.2 11/02/2021    ALT 18 11/02/2021    AST 11 11/02/2021    ALKPHOS 61 11/02/2021    BILITOTAL 0.3 11/02/2021     OTHER:   Lab Results   Component Value Date    NEL 9.0 11/02/2021    LIPASE 76 02/25/2019    TSH 2.70 11/02/2021    T4 1.33 10/31/2008    CRP 0.3 08/22/2006    SED 5 08/22/2006       Anesthesia Plan    ASA Status:  2   NPO Status:  NPO Appropriate    Anesthesia Type: General.   Induction: Intravenous.   Maintenance: TIVA.        Consents    Anesthesia Plan(s) and associated risks, benefits, and realistic alternatives discussed. Questions answered and patient/representative(s) expressed understanding.    - Discussed:     - Discussed with:  Patient         Postoperative Care            Comments:                Andrae Lipscomb, CRNA, APRN CRNA

## 2021-11-17 ENCOUNTER — HOSPITAL ENCOUNTER (OUTPATIENT)
Facility: CLINIC | Age: 31
Discharge: HOME | End: 2021-11-17
Attending: SURGERY | Admitting: SURGERY
Payer: COMMERCIAL

## 2021-11-17 ENCOUNTER — ANESTHESIA (OUTPATIENT)
Dept: GASTROENTEROLOGY | Facility: CLINIC | Age: 31
End: 2021-11-17
Payer: COMMERCIAL

## 2021-11-17 VITALS
RESPIRATION RATE: 16 BRPM | HEIGHT: 67 IN | SYSTOLIC BLOOD PRESSURE: 139 MMHG | WEIGHT: 178 LBS | BODY MASS INDEX: 27.94 KG/M2 | TEMPERATURE: 97.9 F | HEART RATE: 86 BPM | OXYGEN SATURATION: 99 % | DIASTOLIC BLOOD PRESSURE: 89 MMHG

## 2021-11-17 LAB — COLONOSCOPY: NORMAL

## 2021-11-17 PROCEDURE — 258N000003 HC RX IP 258 OP 636: Performed by: SURGERY

## 2021-11-17 PROCEDURE — 250N000009 HC RX 250: Performed by: SURGERY

## 2021-11-17 PROCEDURE — 250N000011 HC RX IP 250 OP 636: Performed by: NURSE ANESTHETIST, CERTIFIED REGISTERED

## 2021-11-17 PROCEDURE — 45380 COLONOSCOPY AND BIOPSY: CPT | Performed by: SURGERY

## 2021-11-17 PROCEDURE — 250N000009 HC RX 250: Performed by: NURSE ANESTHETIST, CERTIFIED REGISTERED

## 2021-11-17 PROCEDURE — 370N000017 HC ANESTHESIA TECHNICAL FEE, PER MIN: Performed by: SURGERY

## 2021-11-17 PROCEDURE — 88305 TISSUE EXAM BY PATHOLOGIST: CPT | Mod: TC | Performed by: SURGERY

## 2021-11-17 RX ORDER — NALOXONE HYDROCHLORIDE 0.4 MG/ML
0.4 INJECTION, SOLUTION INTRAMUSCULAR; INTRAVENOUS; SUBCUTANEOUS
Status: CANCELLED | OUTPATIENT
Start: 2021-11-17

## 2021-11-17 RX ORDER — GLYCOPYRROLATE 0.2 MG/ML
INJECTION, SOLUTION INTRAMUSCULAR; INTRAVENOUS PRN
Status: DISCONTINUED | OUTPATIENT
Start: 2021-11-17 | End: 2021-11-17

## 2021-11-17 RX ORDER — PROPOFOL 10 MG/ML
INJECTION, EMULSION INTRAVENOUS PRN
Status: DISCONTINUED | OUTPATIENT
Start: 2021-11-17 | End: 2021-11-17

## 2021-11-17 RX ORDER — ONDANSETRON 2 MG/ML
4 INJECTION INTRAMUSCULAR; INTRAVENOUS
Status: DISCONTINUED | OUTPATIENT
Start: 2021-11-17 | End: 2021-11-17 | Stop reason: HOSPADM

## 2021-11-17 RX ORDER — SODIUM CHLORIDE, SODIUM LACTATE, POTASSIUM CHLORIDE, CALCIUM CHLORIDE 600; 310; 30; 20 MG/100ML; MG/100ML; MG/100ML; MG/100ML
INJECTION, SOLUTION INTRAVENOUS CONTINUOUS
Status: DISCONTINUED | OUTPATIENT
Start: 2021-11-17 | End: 2021-11-17 | Stop reason: HOSPADM

## 2021-11-17 RX ORDER — LIDOCAINE HYDROCHLORIDE 10 MG/ML
INJECTION, SOLUTION EPIDURAL; INFILTRATION; INTRACAUDAL; PERINEURAL PRN
Status: DISCONTINUED | OUTPATIENT
Start: 2021-11-17 | End: 2021-11-17

## 2021-11-17 RX ORDER — PROPOFOL 10 MG/ML
INJECTION, EMULSION INTRAVENOUS CONTINUOUS PRN
Status: DISCONTINUED | OUTPATIENT
Start: 2021-11-17 | End: 2021-11-17

## 2021-11-17 RX ORDER — LIDOCAINE 40 MG/G
CREAM TOPICAL
Status: DISCONTINUED | OUTPATIENT
Start: 2021-11-17 | End: 2021-11-17 | Stop reason: HOSPADM

## 2021-11-17 RX ORDER — NALOXONE HYDROCHLORIDE 0.4 MG/ML
0.2 INJECTION, SOLUTION INTRAMUSCULAR; INTRAVENOUS; SUBCUTANEOUS
Status: CANCELLED | OUTPATIENT
Start: 2021-11-17

## 2021-11-17 RX ORDER — FLUMAZENIL 0.1 MG/ML
0.2 INJECTION, SOLUTION INTRAVENOUS
Status: CANCELLED | OUTPATIENT
Start: 2021-11-17 | End: 2021-11-17

## 2021-11-17 RX ADMIN — LIDOCAINE HYDROCHLORIDE 50 MG: 10 INJECTION, SOLUTION EPIDURAL; INFILTRATION; INTRACAUDAL; PERINEURAL at 10:56

## 2021-11-17 RX ADMIN — SODIUM CHLORIDE, POTASSIUM CHLORIDE, SODIUM LACTATE AND CALCIUM CHLORIDE: 600; 310; 30; 20 INJECTION, SOLUTION INTRAVENOUS at 09:59

## 2021-11-17 RX ADMIN — LIDOCAINE HYDROCHLORIDE 0.1 ML: 10 INJECTION, SOLUTION EPIDURAL; INFILTRATION; INTRACAUDAL; PERINEURAL at 09:59

## 2021-11-17 RX ADMIN — GLYCOPYRROLATE 0.2 MG: 0.2 INJECTION, SOLUTION INTRAMUSCULAR; INTRAVENOUS at 10:56

## 2021-11-17 RX ADMIN — PROPOFOL 200 MCG/KG/MIN: 10 INJECTION, EMULSION INTRAVENOUS at 10:56

## 2021-11-17 RX ADMIN — PROPOFOL 50 MG: 10 INJECTION, EMULSION INTRAVENOUS at 10:56

## 2021-11-17 ASSESSMENT — MIFFLIN-ST. JEOR: SCORE: 1555.03

## 2021-11-17 NOTE — ANESTHESIA POSTPROCEDURE EVALUATION
Patient: Sherley Quijano    Procedure: Procedure(s):  COLONOSCOPY WITH BIOPSY       Diagnosis:Loose stools [R19.5]  Diagnosis Additional Information: No value filed.    Anesthesia Type:  General    Note:  Disposition: Outpatient   Postop Pain Control: Uneventful            Sign Out: Well controlled pain   PONV: No   Neuro/Psych: Uneventful            Sign Out: Acceptable/Baseline neuro status   Airway/Respiratory: Uneventful            Sign Out: Acceptable/Baseline resp. status   CV/Hemodynamics: Uneventful            Sign Out: Acceptable CV status; No obvious hypovolemia; No obvious fluid overload   Other NRE: NONE   DID A NON-ROUTINE EVENT OCCUR? No           Last vitals:  Vitals Value Taken Time   /62 11/17/21 1123   Temp     Pulse 73 11/17/21 1123   Resp 16 11/17/21 1123   SpO2 97 % 11/17/21 1123       Electronically Signed By: MASHA Houston CRNA  November 17, 2021  11:23 AM

## 2021-11-17 NOTE — H&P
31 year old year old female here for colonoscopy for chronic diarrhea.  Ongoing over past year.  Was admitted earlier this year for it.  Stool studies have bee negative.  Occasionally has pain, random.  No blood in stool.  No known history of IBD or colon cancer in the family.    Patient Active Problem List   Diagnosis     Mild intermittent asthma     Dysfunction of eustachian tube     Attention deficit disorder     Bipolar affective disorder (H)     Adverse reaction to viral vaccines     CARDIOVASCULAR SCREENING; LDL GOAL LESS THAN 130     Migraine headache     Prenatal care, first pregnancy     SGA (small for gestational age)     Supervision of normal first pregnancy     IUGR (intrauterine growth restriction) affecting care of mother, third trimester, fetus 1     Vaginal delivery     Chronic seasonal allergic rhinitis due to pollen     Chronic allergic rhinitis due to animal hair and dander     Allergic rhinitis due to dust mite     Allergic conjunctivitis, bilateral       Past Medical History:   Diagnosis Date     Chickenpox      Chlamydia     age 18       Past Surgical History:   Procedure Laterality Date     D & C  2006    TAB     MOUTH SURGERY  2008    wisdom teeth       Family History   Problem Relation Age of Onset     C.A.D. Maternal Grandfather         MI     Heart Disease Maternal Grandfather      Breast Cancer Maternal Grandmother      Depression Maternal Grandmother      Thyroid Disease Maternal Grandmother      Breast Cancer Paternal Grandmother      Alcohol/Drug Father         alcohol-recovered     Allergies Father      Allergies Sister      Neurologic Disorder Sister         Narcolepsy     Substance Abuse Sister         recovered drugs     Substance Abuse Mother         recovered alcohol     Depression Mother      Thyroid Disease Mother      Breast Cancer Mother      Other - See Comments Other         Heart Attack       No current outpatient medications on file.       Allergies   Allergen Reactions  "    Kiwi Anaphylaxis     Throat swelling, difficulty breathing     Lanolin Hives     Latex Hives     Penicillins Rash       Pt reports that she quit smoking about 4 years ago. Her smoking use included cigarettes. She smoked 0.25 packs per day. She has never used smokeless tobacco. She reports current alcohol use. She reports that she does not use drugs.    Exam:  /86 (BP Location: Right arm)   Pulse 78   Temp 97.9  F (36.6  C) (Oral)   Resp 16   Ht 1.702 m (5' 7\")   Wt 80.7 kg (178 lb)   LMP 11/02/2021 (Exact Date)   SpO2 99%   BMI 27.88 kg/m      Awake, Alert OX3  Lungs - CTA bilaterally  CV - RRR, no murmurs, distal pulses intact  Abd - soft, non-distended, non-tender, +BS  Extr - No cyanosis or edema    A/P 31 year old year old female in need of colonoscopy for chronic diarrhea. Risks, benefits, alternatives, and complications were discussed including the possibility of perforation, bleeding, missed lesion and the patient agreed to proceed.    Abel Calvert, DO on 11/17/2021 at 10:24 AM      "

## 2021-11-17 NOTE — ANESTHESIA CARE TRANSFER NOTE
Patient: Sherley Quijano    Procedure: Procedure(s):  COLONOSCOPY WITH BIOPSY       Diagnosis: Loose stools [R19.5]  Diagnosis Additional Information: No value filed.    Anesthesia Type:   General     Note:    Oropharynx: spontaneously breathing  Level of Consciousness: drowsy  Oxygen Supplementation: room air    Independent Airway: airway patency satisfactory and stable  Dentition: dentition unchanged  Vital Signs Stable: post-procedure vital signs reviewed and stable  Report to RN Given: handoff report given  Patient transferred to: Phase II    Handoff Report: Identifed the Patient, Identified the Reponsible Provider, Reviewed the pertinent medical history, Discussed the surgical course, Reviewed Intra-OP anesthesia mangement and issues during anesthesia, Set expectations for post-procedure period and Allowed opportunity for questions and acknowledgement of understanding      Vitals:  Vitals Value Taken Time   /62 11/17/21 1120   Temp     Pulse 73 11/17/21 1120   Resp     SpO2 97 % 11/17/21 1122   Vitals shown include unvalidated device data.    Electronically Signed By: MASHA Houston CRNA  November 17, 2021  11:23 AM

## 2021-11-18 LAB
PATH REPORT.COMMENTS IMP SPEC: NORMAL
PATH REPORT.COMMENTS IMP SPEC: NORMAL
PATH REPORT.FINAL DX SPEC: NORMAL
PATH REPORT.GROSS SPEC: NORMAL
PATH REPORT.MICROSCOPIC SPEC OTHER STN: NORMAL
PHOTO IMAGE: NORMAL

## 2021-11-18 PROCEDURE — 88305 TISSUE EXAM BY PATHOLOGIST: CPT | Mod: 26 | Performed by: PATHOLOGY

## 2021-11-18 NOTE — ADDENDUM NOTE
Addendum  created 11/18/21 0939 by Tianna Conroy APRN CRNA    Intraprocedure Event edited, Intraprocedure Staff edited

## 2021-11-30 ENCOUNTER — TRANSFERRED RECORDS (OUTPATIENT)
Dept: HEALTH INFORMATION MANAGEMENT | Facility: CLINIC | Age: 31
End: 2021-11-30
Payer: COMMERCIAL

## 2021-12-10 ENCOUNTER — MYC MEDICAL ADVICE (OUTPATIENT)
Dept: FAMILY MEDICINE | Facility: CLINIC | Age: 31
End: 2021-12-10
Payer: COMMERCIAL

## 2022-01-27 ENCOUNTER — MYC REFILL (OUTPATIENT)
Dept: FAMILY MEDICINE | Facility: CLINIC | Age: 32
End: 2022-01-27
Payer: COMMERCIAL

## 2022-01-27 DIAGNOSIS — M62.830 BACK MUSCLE SPASM: ICD-10-CM

## 2022-01-27 DIAGNOSIS — M54.2 CERVICALGIA: Primary | ICD-10-CM

## 2022-01-28 RX ORDER — CYCLOBENZAPRINE HCL 10 MG
10 TABLET ORAL 3 TIMES DAILY PRN
Qty: 30 TABLET | Refills: 0 | Status: SHIPPED | OUTPATIENT
Start: 2022-01-28

## 2022-01-28 NOTE — TELEPHONE ENCOUNTER
Cyndee,    Please see telephone notes & advise.  This med has not been prescribed since 2020.  Do you want virtual visit?    Amira Flynn RN
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
## 2022-02-28 ENCOUNTER — TELEPHONE (OUTPATIENT)
Dept: FAMILY MEDICINE | Facility: CLINIC | Age: 32
End: 2022-02-28
Payer: COMMERCIAL

## 2022-02-28 NOTE — LETTER
My Asthma Action Plan    Name: Sherley Quijano   YOB: 1990  Date: 2/28/2022   My doctor: MASHA Monroy CNP   My clinic: [facility]        My Rescue Medicine:   Albuterol inhaler (Proair/Ventolin/Proventil HFA)  2-4 puffs EVERY 4 HOURS as needed. Use a spacer if recommended by your provider.   My Asthma Severity:   Intermittent / Exercise Induced  Know your asthma triggers: .             GREEN ZONE   Good Control    I feel good    No cough or wheeze    Can work, sleep and play without asthma symptoms       Take your asthma control medicine every day.     1. If exercise triggers your asthma, take your rescue medication    15 minutes before exercise or sports, and    During exercise if you have asthma symptoms  2. Spacer to use with inhaler: If you have a spacer, make sure to use it with your inhaler             YELLOW ZONE Getting Worse  I have ANY of these:    I do not feel good    Cough or wheeze    Chest feels tight    Wake up at night   1. Keep taking your Green Zone medications  2. Start taking your rescue medicine:    every 20 minutes for up to 1 hour. Then every 4 hours for 24-48 hours.  3. If you stay in the Yellow Zone for more than 12-24 hours, contact your doctor.  4. If you do not return to the Green Zone in 12-24 hours or you get worse, start taking your oral steroid medicine if prescribed by your provider.           RED ZONE Medical Alert - Get Help  I have ANY of these:    I feel awful    Medicine is not helping    Breathing getting harder    Trouble walking or talking    Nose opens wide to breathe       1. Take your rescue medicine NOW  2. If your provider has prescribed an oral steroid medicine, start taking it NOW  3. Call your doctor NOW  4. If you are still in the Red Zone after 20 minutes and you have not reached your doctor:    Take your rescue medicine again and    Call 911 or go to the emergency room right away    See your regular doctor within 2  weeks of an Emergency Room or Urgent Care visit for follow-up treatment.          Annual Reminders:  Meet with Asthma Educator,  Flu Shot in the Fall, consider Pneumonia Vaccination for patients with asthma (aged 19 and older).    Pharmacy: [id number] [address]    Electronically signed by MASHA Monroy CNP   Date: 02/28/22                    Asthma Triggers  How To Control Things That Make Your Asthma Worse    Triggers are things that make your asthma worse.  Look at the list below to help you find your triggers and   what you can do about them. You can help prevent asthma flare-ups by staying away from your triggers.      Trigger                                                          What you can do   Cigarette Smoke  Tobacco smoke can make asthma worse. Do not allow smoking in your home, car or around you.  Be sure no one smokes at a child s day care or school.  If you smoke, ask your health care provider for ways to help you quit.  Ask family members to quit too.  Ask your health care provider for a referral to Quit Plan to help you quit smoking, or call 1-765-455-PLAN.     Colds, Flu, Bronchitis  These are common triggers of asthma. Wash your hands often.  Don t touch your eyes, nose or mouth.  Get a flu shot every year.     Dust Mites  These are tiny bugs that live in cloth or carpet. They are too small to see. Wash sheets and blankets in hot water every week.   Encase pillows and mattress in dust mite proof covers.  Avoid having carpet if you can. If you have carpet, vacuum weekly.   Use a dust mask and HEPA vacuum.   Pollen and Outdoor Mold  Some people are allergic to trees, grass, or weed pollen, or molds. Try to keep your windows closed.  Limit time out doors when pollen count is high.   Ask you health care provider about taking medicine during allergy season.     Animal Dander  Some people are allergic to skin flakes, urine or saliva from pets with fur or  feathers. Keep pets with fur or feathers out of your home.    If you can t keep the pet outdoors, then keep the pet out of your bedroom.  Keep the bedroom door closed.  Keep pets off cloth furniture and away from stuffed toys.     Mice, Rats, and Cockroaches  Some people are allergic to the waste from these pests.   Cover food and garbage.  Clean up spills and food crumbs.  Store grease in the refrigerator.   Keep food out of the bedroom.   Indoor Mold  This can be a trigger if your home has high moisture. Fix leaking faucets, pipes, or other sources of water.   Clean moldy surfaces.  Dehumidify basement if it is damp and smelly.   Smoke, Strong Odors, and Sprays  These can reduce air quality. Stay away from strong odors and sprays, such as perfume, powder, hair spray, paints, smoke incense, paint, cleaning products, candles and new carpet.   Exercise or Sports  Some people with asthma have this trigger. Be active!  Ask your doctor about taking medicine before sports or exercise to prevent symptoms.    Warm up for 5-10 minutes before and after sports or exercise.     Other Triggers of Asthma  Cold air:  Cover your nose and mouth with a scarf.  Sometimes laughing or crying can be a trigger.  Some medicines and food can trigger asthma.

## 2022-02-28 NOTE — TELEPHONE ENCOUNTER
Patient Quality Outreach    Patient is due for the following:   Asthma  -  ACT needed    NEXT STEPS:   complete ACT    Type of outreach:    Sent [employer] message.      Questions for provider review:    None     Jeannie Lovell, CMA

## 2022-07-19 ENCOUNTER — TELEPHONE (OUTPATIENT)
Dept: FAMILY MEDICINE | Facility: CLINIC | Age: 32
End: 2022-07-19

## 2022-07-19 NOTE — TELEPHONE ENCOUNTER
Patient Quality Outreach    Patient is due for the following:   Asthma  -  ACT needed    NEXT STEPS:   complete ACT    Type of outreach:    Sent [employer] message.    Questions for provider review:    None     Jeannie Lovell, CMA

## 2022-09-22 ENCOUNTER — OFFICE VISIT (OUTPATIENT)
Dept: FAMILY MEDICINE | Facility: CLINIC | Age: 32
End: 2022-09-22
Payer: COMMERCIAL

## 2022-09-22 VITALS
TEMPERATURE: 97.8 F | DIASTOLIC BLOOD PRESSURE: 82 MMHG | HEART RATE: 85 BPM | WEIGHT: 179.6 LBS | BODY MASS INDEX: 28.87 KG/M2 | HEIGHT: 66 IN | RESPIRATION RATE: 22 BRPM | SYSTOLIC BLOOD PRESSURE: 122 MMHG | OXYGEN SATURATION: 100 %

## 2022-09-22 DIAGNOSIS — L21.9 SEBORRHEIC DERMATITIS: Primary | ICD-10-CM

## 2022-09-22 DIAGNOSIS — H60.92 OTITIS EXTERNA OF LEFT EAR, UNSPECIFIED CHRONICITY, UNSPECIFIED TYPE: ICD-10-CM

## 2022-09-22 PROCEDURE — 99213 OFFICE O/P EST LOW 20 MIN: CPT | Performed by: PHYSICIAN ASSISTANT

## 2022-09-22 RX ORDER — NEOMYCIN SULFATE, POLYMYXIN B SULFATE, HYDROCORTISONE 3.5; 10000; 1 MG/ML; [USP'U]/ML; MG/ML
3 SOLUTION/ DROPS AURICULAR (OTIC) 4 TIMES DAILY
Qty: 10 ML | Refills: 1 | Status: SHIPPED | OUTPATIENT
Start: 2022-09-22 | End: 2024-03-05

## 2022-09-22 RX ORDER — KETOCONAZOLE 20 MG/G
CREAM TOPICAL DAILY
Qty: 15 G | Refills: 1 | Status: SHIPPED | OUTPATIENT
Start: 2022-09-22

## 2022-09-22 ASSESSMENT — ASTHMA QUESTIONNAIRES
QUESTION_5 LAST FOUR WEEKS HOW WOULD YOU RATE YOUR ASTHMA CONTROL: COMPLETELY CONTROLLED
QUESTION_2 LAST FOUR WEEKS HOW OFTEN HAVE YOU HAD SHORTNESS OF BREATH: NOT AT ALL
QUESTION_1 LAST FOUR WEEKS HOW MUCH OF THE TIME DID YOUR ASTHMA KEEP YOU FROM GETTING AS MUCH DONE AT WORK, SCHOOL OR AT HOME: NONE OF THE TIME
ACT_TOTALSCORE: 25
ACT_TOTALSCORE: 25
QUESTION_4 LAST FOUR WEEKS HOW OFTEN HAVE YOU USED YOUR RESCUE INHALER OR NEBULIZER MEDICATION (SUCH AS ALBUTEROL): NOT AT ALL
QUESTION_3 LAST FOUR WEEKS HOW OFTEN DID YOUR ASTHMA SYMPTOMS (WHEEZING, COUGHING, SHORTNESS OF BREATH, CHEST TIGHTNESS OR PAIN) WAKE YOU UP AT NIGHT OR EARLIER THAN USUAL IN THE MORNING: NOT AT ALL

## 2022-09-22 ASSESSMENT — PAIN SCALES - GENERAL: PAINLEVEL: NO PAIN (0)

## 2022-09-22 NOTE — PROGRESS NOTES
Assessment & Plan     Seborrheic dermatitis  treat  - ketoconazole (NIZORAL) 2 % external cream  Dispense: 15 g; Refill: 1    Otitis externa of left ear, unspecified chronicity, unspecified type  treat  - neomycin-polymyxin-hydrocortisone (CORTISPORIN) [id number] otic solution  Dispense: 10 mL; Refill: 1    Patient Instructions   Can stay on ear drops until see ENT   When discharge is better, ok to try the ketoconazole cream - gently apply where you can reach rash easily    Ear drums look fine - unclear why dizzy    Message me if needing something prior to seeing ENT       No follow-ups on file.    NICHELLE Mason [facility]    Tamar Lepe is a 31 year old, presenting for the following health issues:  Ear Problem (Right worse than left)    History of Present Illness       Reason for visit:  Ear pain, discharge  Symptom onset:  More than a month  Symptoms include:  Pain  Symptom intensity:  Severe  Symptom progression:  Worsening  Had these symptoms before:  Yes  Has tried/received treatment for these symptoms:  Yes  Previous treatment was successful:  No  What makes it worse:  Laying down, walking, moving  What makes it better:  No    Dizziness but sometimes lightheaded.  Feels like listening under water.  Drainage.  No congestion.  Has spring allergies.  Long history.  Sometimes starts as eczema in her ear - dealt with it since age 2.  Becomes infection 3-4 x a year.  When younger (16) was given prednisone that would work for 2 months.  Cortisporin, ciprodex work but neither better than the other.  Has seen many ENT over the years.  Upcoming ENT appt.    She eats 2-3 servings of fruits and vegetables daily.She consumes 0 sweetened beverage(s) daily.She exercises with enough effort to increase her heart rate 9 or less minutes per day.  She exercises with enough effort to increase her heart rate 3 or less days per week.   She is taking medications regularly.        "  Objective    /82 (BP Location: Right arm, Patient Position: Sitting, Cuff Size: Adult Large)   Pulse 85   Temp 97.8  F (36.6  C) (Tympanic)   Resp 22   Ht 1.676 m (5' 6\")   Wt 81.5 kg (179 lb 9.6 oz)   LMP  (LMP Unknown)   SpO2 100%   Breastfeeding No   BMI 28.99 kg/m    Body mass index is 28.99 kg/m .  Physical Exam   TMs normal  Canals swollen and exudative, flaking at opening        Answers for HPI/ROS submitted by the patient on 9/22/2022  How many servings of fruits and vegetables do you eat daily?: 2-3  On average, how many sweetened beverages do you drink each day (Examples: soda, juice, sweet tea, etc.  Do NOT count diet or artificially sweetened beverages)?: 0  How many minutes a day do you exercise enough to make your heart beat faster?: 9 or less  How many days a week do you exercise enough to make your heart beat faster?: 3 or less  How many days per week do you miss taking your medication?: 0  What is the reason for your visit today?: Ear pain, discharge  When did your symptoms begin?: More than a month  What are your symptoms?: Pain  How would you describe these symptoms?: Severe  Are your symptoms:: Worsening  Have you had these symptoms before?: Yes  Have you tried or received treatment for these symptoms before?: Yes  Did that treatment work? : No  Is there anything that makes you feel worse?: Laying down, walking, moving  Is there anything that makes you feel better?: No      "

## 2022-09-22 NOTE — PATIENT INSTRUCTIONS
Can stay on ear drops until see ENT   When discharge is better, ok to try the ketoconazole cream - gently apply where you can reach rash easily    Ear drums look fine - unclear why dizzy    Message me if needing something prior to seeing ENT

## 2022-10-25 ENCOUNTER — TELEPHONE (OUTPATIENT)
Dept: OTOLARYNGOLOGY | Facility: CLINIC | Age: 32
End: 2022-10-25

## 2022-10-25 ENCOUNTER — OFFICE VISIT (OUTPATIENT)
Dept: OTOLARYNGOLOGY | Facility: CLINIC | Age: 32
End: 2022-10-25
Payer: COMMERCIAL

## 2022-10-25 ENCOUNTER — OFFICE VISIT (OUTPATIENT)
Dept: AUDIOLOGY | Facility: CLINIC | Age: 32
End: 2022-10-25
Payer: COMMERCIAL

## 2022-10-25 VITALS
HEART RATE: 70 BPM | OXYGEN SATURATION: 100 % | TEMPERATURE: 97.3 F | DIASTOLIC BLOOD PRESSURE: 76 MMHG | SYSTOLIC BLOOD PRESSURE: 122 MMHG

## 2022-10-25 DIAGNOSIS — H90.3 SENSORINEURAL HEARING LOSS, BILATERAL: Primary | ICD-10-CM

## 2022-10-25 DIAGNOSIS — L29.9 EAR ITCHING: ICD-10-CM

## 2022-10-25 DIAGNOSIS — H62.41 OTITIS EXTERNA, FUNGAL, RIGHT EAR: ICD-10-CM

## 2022-10-25 DIAGNOSIS — B36.9 OTITIS EXTERNA, FUNGAL, RIGHT EAR: ICD-10-CM

## 2022-10-25 DIAGNOSIS — H90.3 SNHL (SENSORY-NEURAL HEARING LOSS), ASYMMETRICAL: Primary | ICD-10-CM

## 2022-10-25 PROCEDURE — 92504 EAR MICROSCOPY EXAMINATION: CPT | Performed by: OTOLARYNGOLOGY

## 2022-10-25 PROCEDURE — 99207 PR NO CHARGE LOS: CPT | Performed by: AUDIOLOGIST

## 2022-10-25 PROCEDURE — 99214 OFFICE O/P EST MOD 30 MIN: CPT | Mod: 25 | Performed by: OTOLARYNGOLOGY

## 2022-10-25 RX ORDER — AMPHOTERICIN B
POWDER (GRAM) MISCELLANEOUS
Qty: 3 G | Refills: 0 | Status: SHIPPED | OUTPATIENT
Start: 2022-10-25

## 2022-10-25 RX ORDER — FLUOCINOLONE ACETONIDE 0.11 MG/ML
OIL AURICULAR (OTIC)
Qty: 20 ML | Refills: 3 | Status: SHIPPED | OUTPATIENT
Start: 2022-10-25

## 2022-10-25 NOTE — PROGRESS NOTES
CHIEF COMPLAINT:  Patient presents with:  Otitis Media: Patient is being seen today for discharge in right ear. Bilateral ear pain.          HISTORY OF PRESENT ILLNESS    Sherley was seen in follow up after previous Visit date not found visit for audiogram review.   Audiogram not able to be done today due to infection.     Previous ENT visit: 2/21/20      It was my pleasure seeing Sherley Quijano today in clinic.  From an ear standpoint, her MRI was negative for any retrocochlear pathology.  Her hearing loss and tinnitus are likely related to noise exposure.  We discussed protecting her hearing in noisy environments.  We discussed repeating an audiogram in 1 to 3 years or sooner if she notices significant hearing change.  She would not be a great candidate for hearing aid at this time but could try hearing aids in the future if she is having issues.       REVIEW OF SYSTEMS    Review of Systems: a 10-system review is reviewed at this encounter.  See scanned document.       Allergies   Allergen Reactions     Kiwi Anaphylaxis     Throat swelling, difficulty breathing     Lanolin Hives     Latex Hives     Penicillins Rash           PHYSICAL EXAM:        HEAD: Normal appearance and symmetry:  No cutaneous lesions.      EARS:   Auricles normal     NOSE:    Dorsum:   straight       ORAL CAVITY/OROPHARYNX:    Lips:  Normal.     NECK:  Trachea:  midline     NEURO:   Alert and Oriented    GAIT AND STATION:  normal     RESPIRATORY:   Symmetry and Respiratory effort    PSYCH:   normal mood and affect    SKIN:  warm and dry         IMPRESSION:   Encounter Diagnoses   Name Primary?     SNHL (sensory-neural hearing loss), asymmetrical Yes     Otitis externa, fungal, right ear      Ear itching             RECOMMENDATIONS:    No orders of the defined types were placed in this encounter.     Orders Placed This Encounter   Medications     Amphotericin B 905 UNIT/MG POWD     Sig: CSF/HC otic powder 1 puff to RIGHT ear twice daily for 3 weeks      Dispense:  3 g     Refill:  0     dispense 3 capsules with Otomed insufflator     fluocinolone acetonide 0.01 % OIL     Si-3 drops to affected ear every other night as needed for itching     Dispense:  20 mL     Refill:  3

## 2022-10-25 NOTE — LETTER
10/25/2022         RE: Sherley Quijano  [address] [address]        Dear Colleague,    Thank you for referring your patient, Sherley Quijano, to the [facility]. Please see a copy of my visit note below.    CHIEF COMPLAINT:  Patient presents with:  Otitis Media: Patient is being seen today for discharge in right ear. Bilateral ear pain.          HISTORY OF PRESENT ILLNESS    Sherley was seen in follow up after previous Visit date not found visit for audiogram review.   Audiogram not able to be done today due to infection.     Previous ENT visit: 2/21/20      It was my pleasure seeing Sherley Quijano today in clinic.  From an ear standpoint, her MRI was negative for any retrocochlear pathology.  Her hearing loss and tinnitus are likely related to noise exposure.  We discussed protecting her hearing in noisy environments.  We discussed repeating an audiogram in 1 to 3 years or sooner if she notices significant hearing change.  She would not be a great candidate for hearing aid at this time but could try hearing aids in the future if she is having issues.       REVIEW OF SYSTEMS    Review of Systems: a 10-system review is reviewed at this encounter.  See scanned document.       Allergies   Allergen Reactions     Kiwi Anaphylaxis     Throat swelling, difficulty breathing     Lanolin Hives     Latex Hives     Penicillins Rash           PHYSICAL EXAM:        HEAD: Normal appearance and symmetry:  No cutaneous lesions.      EARS:   Auricles normal     NOSE:    Dorsum:   straight       ORAL CAVITY/OROPHARYNX:    Lips:  Normal.     NECK:  Trachea:  midline     NEURO:   Alert and Oriented    GAIT AND STATION:  normal     RESPIRATORY:   Symmetry and Respiratory effort    PSYCH:   normal mood and affect    SKIN:  warm and dry         IMPRESSION:   Encounter Diagnoses   Name Primary?     SNHL (sensory-neural hearing loss), asymmetrical Yes     Otitis externa, fungal, right ear      Ear itching              RECOMMENDATIONS:    No orders of the defined types were placed in this encounter.     Orders Placed This Encounter   Medications     Amphotericin B 905 UNIT/MG POWD     Sig: CSF/HC otic powder 1 puff to RIGHT ear twice daily for 3 weeks     Dispense:  3 g     Refill:  0     dispense 3 capsules with Otomed insufflator     fluocinolone acetonide 0.01 % OIL     Si-3 drops to affected ear every other night as needed for itching     Dispense:  20 mL     Refill:  3           Again, thank you for allowing me to participate in the care of your patient.        Sincerely,        Bora Guajardo MD

## 2022-10-25 NOTE — PROGRESS NOTES
AUDIOLOGY REPORT    SUBJECTIVE:  Sherley Quijano is a 31 year old female who was seen in the Audiology Clinic at the [facility] for audiologic evaluation, referred by Tesfaye Guajardo M.D. .The patient has been seen previously in this clinic on 1/13/2020 for assessment and results indicated an asymmetrical sensorineural hearing loss bilaterally.  The patient reports a plugged left ear. She reports a history of occupational and recreational noise exposure. The patient denies  bilateral drainage.  The patient notes difficulty with communication in a variety of listening situations.  They were accompanied today by their self.    OBJECTIVE:    Otoscopic exam indicates drainage  in the left ear. After medical evaluation and cleaning by Dr. Guajardo the audiogram was deferred.       ASSESSMENT:   No diagnosis found.      PLAN:   It is recommended that the patient return to clinic for a hearing evaluation after her left ear is clear of drainage and medical treatment is complete.  Please call this clinic with questions regarding these results or recommendations.        Nettie BELLO-[facility], #[id number]

## 2022-10-25 NOTE — NURSING NOTE
"Initial /76 (BP Location: Right arm, Patient Position: Chair, Cuff Size: Adult Regular)   Pulse 70   Temp 97.3  F (36.3  C)   LMP  (LMP Unknown)   SpO2 100%  Estimated body mass index is 28.99 kg/m  as calculated from the following:    Height as of 9/22/22: 1.676 m (5' 6\").    Weight as of 9/22/22: 81.5 kg (179 lb 9.6 oz). .    Deborah Morillo LPN on 10/25/2022 at 10:06 AM    "

## 2022-10-25 NOTE — TELEPHONE ENCOUNTER
Called pt and left a VM to schedule a appointment with Dr. Guajardo in the [facility] in 6-8 weeks.  Send CSF instructions through mail to pt address listed in chart.     [facility]      Yu Overton RN  [address] [address]  [email or web address]   Office:[phone]  Employed by [employer] [facility]

## 2022-12-18 ENCOUNTER — HEALTH MAINTENANCE LETTER (OUTPATIENT)
Age: 32
End: 2022-12-18

## 2023-01-05 ENCOUNTER — E-VISIT (OUTPATIENT)
Dept: URGENT CARE | Facility: CLINIC | Age: 33
End: 2023-01-05
Payer: COMMERCIAL

## 2023-01-05 DIAGNOSIS — N39.0 ACUTE UTI (URINARY TRACT INFECTION): Primary | ICD-10-CM

## 2023-01-05 PROCEDURE — 99421 OL DIG E/M SVC 5-10 MIN: CPT | Performed by: PHYSICIAN ASSISTANT

## 2023-01-05 RX ORDER — NITROFURANTOIN 25; 75 MG/1; MG/1
100 CAPSULE ORAL 2 TIMES DAILY
Qty: 10 CAPSULE | Refills: 0 | Status: SHIPPED | OUTPATIENT
Start: 2023-01-05 | End: 2023-01-10

## 2023-01-05 NOTE — PATIENT INSTRUCTIONS
Dear Sherley Quijano    After reviewing your responses, I've been able to diagnose you with a urinary tract infection, which is a common infection of the bladder with bacteria.  This is not a sexually transmitted infection, though urinating immediately after intercourse can help prevent infections.  Drinking lots of fluids is also helpful to clear your current infection and prevent the next one.      I have sent a prescription for antibiotics to your pharmacy to treat this infection.    It is important that you take all of your prescribed medication even if your symptoms are improving after a few doses.  Taking all of your medicine helps prevent the symptoms from returning.     If your symptoms worsen, you develop pain in your back or stomach, develop fevers, or are not improving in 5 days, please contact your primary care provider for an appointment or visit any of our convenient Walk-in or Urgent Care Centers to be seen, which can be found on our website here.    Thanks again for choosing us as your health care partner,    Rafa Murillo, [facility], PA-C    Urinary Tract Infections in Women  Urinary tract infections (UTIs) are most often caused by bacteria. These bacteria enter the urinary tract. The bacteria may come from inside the body. Or they may travel from the skin outside the rectum or vagina into the urethra. Female anatomy makes it easy for bacteria from the bowel to enter a woman s urinary tract, which is the most common source of UTI. This means women develop UTIs more often than men. Pain in or around the urinary tract is a common UTI symptom. But the only way to know for sure if you have a UTI for the healthcare provider to test your urine. The two tests that may be done are the urinalysis and urine culture.     Types of UTIs    Cystitis. A bladder infection (cystitis) is the most common UTI in women. You may have urgent or frequent need to pee. You may also have pain, burning when you pee, and bloody  urine.    Urethritis. This is an inflamed urethra, which is the tube that carries urine from the bladder to outside the body. You may have lower stomach or back pain. You may also have urgent or frequent need to pee.    Pyelonephritis. This is a kidney infection. If not treated, it can be serious and damage your kidneys. In severe cases, you may need to stay in the hospital. You may have a fever and lower back pain.    Medicines to treat a UTI  Most UTIs are treated with antibiotics. These kill the bacteria. The length of time you need to take them depends on the type of infection. It may be as short as 3 days. If you have repeated UTIs, you may need a low-dose antibiotic for several months. Take antibiotics exactly as directed. Don t stop taking them until all of the medicine is gone. If you stop taking the antibiotic too soon, the infection may not go away. You may also develop a resistance to the antibiotic. This can make it much harder to treat.   Lifestyle changes to treat and prevent UTIs   The lifestyle changes below will help get rid of your UTI. They may also help prevent future UTIs.     Drink plenty of fluids. This includes water, juice, or other caffeine-free drinks. Fluids help flush bacteria out of your body.    Empty your bladder. Always empty your bladder when you feel the urge to pee. And always pee before going to sleep. Urine that stays in your bladder can lead to infection. Try to pee before and after sex as well.    Practice good personal hygiene. Wipe yourself from front to back after using the toilet. This helps keep bacteria from getting into the urethra.    Use condoms during sex. These help prevent UTIs caused by sexually transmitted bacteria. Also don't use spermicides during sex. These can increase the risk for UTIs. Choose other forms of birth control instead. For women who tend to get UTIs after sex, a low-dose of a preventive antibiotic may be used. Be sure to discuss this option with  your healthcare provider.    Follow up with your healthcare provider as directed. He or she may test to make sure the infection has cleared. If needed, more treatment may be started.  Lou last reviewed this educational content on 7/1/2019 2000-2021 [employer]. All rights reserved. This information is not intended as a substitute for professional medical care. Always follow your healthcare professional's instructions.

## 2023-01-06 ENCOUNTER — OFFICE VISIT (OUTPATIENT)
Dept: OTOLARYNGOLOGY | Facility: CLINIC | Age: 33
End: 2023-01-06
Payer: COMMERCIAL

## 2023-01-06 ENCOUNTER — OFFICE VISIT (OUTPATIENT)
Dept: AUDIOLOGY | Facility: CLINIC | Age: 33
End: 2023-01-06
Payer: COMMERCIAL

## 2023-01-06 DIAGNOSIS — H61.23 KERATIN DEBRIS OF EAR CANAL, BILATERAL: ICD-10-CM

## 2023-01-06 DIAGNOSIS — H62.41 OTITIS EXTERNA, FUNGAL, RIGHT EAR: Primary | ICD-10-CM

## 2023-01-06 DIAGNOSIS — B36.9 OTITIS EXTERNA, FUNGAL, RIGHT EAR: Primary | ICD-10-CM

## 2023-01-06 DIAGNOSIS — H62.40 CHRONIC FUNGAL OTITIS EXTERNA: ICD-10-CM

## 2023-01-06 DIAGNOSIS — H90.3 SNHL (SENSORY-NEURAL HEARING LOSS), ASYMMETRICAL: ICD-10-CM

## 2023-01-06 DIAGNOSIS — B36.9 CHRONIC FUNGAL OTITIS EXTERNA: ICD-10-CM

## 2023-01-06 DIAGNOSIS — H90.3 SENSORINEURAL HEARING LOSS, BILATERAL: Primary | ICD-10-CM

## 2023-01-06 PROCEDURE — 92504 EAR MICROSCOPY EXAMINATION: CPT | Performed by: OTOLARYNGOLOGY

## 2023-01-06 PROCEDURE — 99213 OFFICE O/P EST LOW 20 MIN: CPT | Mod: 25 | Performed by: OTOLARYNGOLOGY

## 2023-01-06 PROCEDURE — 92567 TYMPANOMETRY: CPT | Performed by: AUDIOLOGIST

## 2023-01-06 PROCEDURE — 92557 COMPREHENSIVE HEARING TEST: CPT | Performed by: AUDIOLOGIST

## 2023-01-06 RX ORDER — CLOTRIMAZOLE AND BETAMETHASONE DIPROPIONATE 10; .64 MG/G; MG/G
CREAM TOPICAL 2 TIMES DAILY
Qty: 15 G | Refills: 0 | Status: SHIPPED | OUTPATIENT
Start: 2023-01-06 | End: 2023-01-16

## 2023-01-06 NOTE — PROGRESS NOTES
CHIEF COMPLAINT:  Patient presents with:  Ear Problem: Used CSF powder for right ear is better, left ear did have a little drainage and used the left over CSF powder on the left ear, right ear hearing got worse.         HISTORY OF PRESENT ILLNESS    Sherley was seen in follow up after previous visit after application of CSF otic powder to RIGHT ear.  Patient reports using CSF powder in the left ear as well.  Thinks the hearing is down versus a few year.  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         REVIEW OF SYSTEMS    Review of Systems: a 10-system review is reviewed at this encounter.  See scanned document.       Allergies   Allergen Reactions     Kiwi Anaphylaxis     Throat swelling, difficulty breathing     Lanolin Hives     Latex Hives     Penicillins Rash           PHYSICAL EXAM:        HEAD: Normal appearance and symmetry:  No cutaneous lesions.      EAR DEBRIDEMENT    Right: crusting of outer EAC skin; TM intact; EAC debrided with #3 suction  Left:  Skin ulceration of outer EAC with crusting removed with wire loop)     NOSE:    Dorsum:   straight       ORAL CAVITY/OROPHARYNX:    Lips:  Normal.     NECK:  Trachea:  midline     NEURO:   Alert and Oriented    GAIT AND STATION:  normal     RESPIRATORY:   Symmetry and Respiratory effort    PSYCH:   normal mood and affect    SKIN:  warm and dry         IMPRESSION:   Encounter Diagnoses   Name Primary?     Otitis externa, fungal, right ear Yes     SNHL (sensory-neural hearing loss), asymmetrical      Chronic fungal otitis externa             RECOMMENDATIONS:    No orders of the defined types were placed in this encounter.     Orders Placed This Encounter   Medications     clotrimazole-betamethasone (LOTRISONE) 1-0.05 % external cream     Sig: Apply topically 2 times daily for 10 days Apply to outer ear canals of both ears 2x daily for 10 days     Dispense:  15 g     Refill:  0     Keep ears dry  She is medically cleared  for hearing aids  Return visit 4 months, apply GV at that time  Lotrisone cream as directed

## 2023-01-06 NOTE — PROGRESS NOTES
AUDIOLOGY REPORT    SUMMARY: Audiology visit completed. See audiogram for results.      RECOMMENDATIONS: Follow-up with ENT. Patient is interested in pursuing a right hearing aid. She may return for a hearing aid consultation pending medical clearance.    Kelsi Kapoor, CCC-A  Clinical Audiologist  [place] #[id number]

## 2023-01-06 NOTE — LETTER
1/6/2023         RE: Sherley Quijano  [address] [facility] [address]        Dear Colleague,    Thank you for referring your patient, Sherley Quijano, to the [facility]. Please see a copy of my visit note below.    CHIEF COMPLAINT:  Patient presents with:  Ear Problem: Used CSF powder for right ear is better, left ear did have a little drainage and used the left over CSF powder on the left ear, right ear hearing got worse.         HISTORY OF PRESENT ILLNESS    Sherley was seen in follow up after previous visit after application of CSF otic powder to RIGHT ear.  Patient reports using CSF powder in the left ear as well.  Thinks the hearing is down versus a few year.  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         REVIEW OF SYSTEMS    Review of Systems: a 10-system review is reviewed at this encounter.  See scanned document.       Allergies   Allergen Reactions     Kiwi Anaphylaxis     Throat swelling, difficulty breathing     Lanolin Hives     Latex Hives     Penicillins Rash           PHYSICAL EXAM:        HEAD: Normal appearance and symmetry:  No cutaneous lesions.      EAR DEBRIDEMENT    Right: crusting of outer EAC skin; TM intact; EAC debrided with #3 suction  Left:  Skin ulceration of outer EAC with crusting removed with wire loop)     NOSE:    Dorsum:   straight       ORAL CAVITY/OROPHARYNX:    Lips:  Normal.     NECK:  Trachea:  midline     NEURO:   Alert and Oriented    GAIT AND STATION:  normal     RESPIRATORY:   Symmetry and Respiratory effort    PSYCH:   normal mood and affect    SKIN:  warm and dry         IMPRESSION:   Encounter Diagnoses   Name Primary?     Otitis externa, fungal, right ear Yes     SNHL (sensory-neural hearing loss), asymmetrical      Chronic fungal otitis externa             RECOMMENDATIONS:    No orders of the defined types were placed in this encounter.     Orders Placed This Encounter   Medications      clotrimazole-betamethasone (LOTRISONE) 1-0.05 % external cream     Sig: Apply topically 2 times daily for 10 days Apply to outer ear canals of both ears 2x daily for 10 days     Dispense:  15 g     Refill:  0     Keep ears dry  She is medically cleared for hearing aids  Return visit 4 months, apply GV at that time  Lotrisone cream as directed      Again, thank you for allowing me to participate in the care of your patient.        Sincerely,        Bora Guajardo MD

## 2023-04-20 NOTE — PROGRESS NOTES
CHIEF COMPLAINT:  Patient presents with:  Follow Up: Per pt ears feel ear are getting better but still feels plugged mostly feel plugged in the morning but not always. Also ears are itchy and dry.           HISTORY OF PRESENT ILLNESS    Sherley was seen in follow up after previous 1/6/2023 visit for recheck ears. She is using the lotrisone cream every other week to keep the crusting at bay.   This is best her ears have felt in years.  She wakes up with aching/pressure behind her ears. She wears a mouthguard at night.  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     My previous note:    Encounter Diagnoses   Name Primary?     Otitis externa, fungal, right ear Yes     SNHL (sensory-neural hearing loss), asymmetrical       Chronic fungal otitis externa             RECOMMENDATIONS:     No orders of the defined types were placed in this encounter.          Orders Placed This Encounter   Medications     clotrimazole-betamethasone (LOTRISONE) 1-0.05 % external cream       Sig: Apply topically 2 times daily for 10 days Apply to outer ear canals of both ears 2x daily for 10 days       Dispense:  15 g       Refill:  0      Keep ears dry  She is medically cleared for hearing aids  Return visit 4 months, apply GV at that time  Lotrisone cream as directed      REVIEW OF SYSTEMS    Review of Systems: a 10-system review is reviewed at this encounter.  See scanned document.       Allergies   Allergen Reactions     Kiwi Anaphylaxis     Throat swelling, difficulty breathing  Throat swelling, difficulty breathing     Lanolin Hives     Latex Hives     Penicillins Rash           PHYSICAL EXAM:        HEAD: Normal appearance and symmetry:  No cutaneous lesions.      EARS:        RIGHT:  {external auditory canals clear, tympanic membranes normal   LEFT:    {external auditory canals clear, tympanic membranes normal     NOSE:    Dorsum:   straight       ORAL CAVITY/OROPHARYNX:    Lips:  Normal.      NECK:  Trachea:  midline     NEURO:   Alert and Oriented    GAIT AND STATION:  normal     RESPIRATORY:   Symmetry and Respiratory effort    PSYCH:   normal mood and affect    SKIN:  warm and dry         IMPRESSION:   Encounter Diagnosis   Name Primary?     SNHL (sensory-neural hearing loss), asymmetrical Yes            RECOMMENDATIONS:    Return 6 months with audiogram  Consider trial of hearing aids

## 2023-04-21 ENCOUNTER — OFFICE VISIT (OUTPATIENT)
Dept: OTOLARYNGOLOGY | Facility: CLINIC | Age: 33
End: 2023-04-21
Payer: COMMERCIAL

## 2023-04-21 DIAGNOSIS — H90.3 SNHL (SENSORY-NEURAL HEARING LOSS), ASYMMETRICAL: Primary | ICD-10-CM

## 2023-04-21 PROCEDURE — 99214 OFFICE O/P EST MOD 30 MIN: CPT | Performed by: OTOLARYNGOLOGY

## 2023-04-21 NOTE — LETTER
4/21/2023         RE: Sherley Quijano  [address] [facility] [address]        Dear Colleague,    Thank you for referring your patient, Sherley Quijano, to the [facility]. Please see a copy of my visit note below.    CHIEF COMPLAINT:  Patient presents with:  Follow Up: Per pt ears feel ear are getting better but still feels plugged mostly feel plugged in the morning but not always. Also ears are itchy and dry.           HISTORY OF PRESENT ILLNESS    Sherley was seen in follow up after previous 1/6/2023 visit for recheck ears. She is using the lotrisone cream every other week to keep the crusting at bay.   This is best her ears have felt in years.  She wakes up with aching/pressure behind her ears. She wears a mouthguard at night.  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     My previous note:    Encounter Diagnoses   Name Primary?     Otitis externa, fungal, right ear Yes     SNHL (sensory-neural hearing loss), asymmetrical       Chronic fungal otitis externa             RECOMMENDATIONS:     No orders of the defined types were placed in this encounter.          Orders Placed This Encounter   Medications     clotrimazole-betamethasone (LOTRISONE) 1-0.05 % external cream       Sig: Apply topically 2 times daily for 10 days Apply to outer ear canals of both ears 2x daily for 10 days       Dispense:  15 g       Refill:  0      Keep ears dry  She is medically cleared for hearing aids  Return visit 4 months, apply GV at that time  Lotrisone cream as directed      REVIEW OF SYSTEMS    Review of Systems: a 10-system review is reviewed at this encounter.  See scanned document.       Allergies   Allergen Reactions     Kiwi Anaphylaxis     Throat swelling, difficulty breathing  Throat swelling, difficulty breathing     Lanolin Hives     Latex Hives     Penicillins Rash           PHYSICAL EXAM:        HEAD: Normal appearance and symmetry:  No cutaneous lesions.       EARS:        RIGHT:  {external auditory canals clear, tympanic membranes normal   LEFT:    {external auditory canals clear, tympanic membranes normal     NOSE:    Dorsum:   straight       ORAL CAVITY/OROPHARYNX:    Lips:  Normal.     NECK:  Trachea:  midline     NEURO:   Alert and Oriented    GAIT AND STATION:  normal     RESPIRATORY:   Symmetry and Respiratory effort    PSYCH:   normal mood and affect    SKIN:  warm and dry         IMPRESSION:   Encounter Diagnosis   Name Primary?     SNHL (sensory-neural hearing loss), asymmetrical Yes            RECOMMENDATIONS:    Return 6 months with audiogram  Consider trial of hearing aids        Again, thank you for allowing me to participate in the care of your patient.        Sincerely,        Bora Guajardo MD

## 2023-04-21 NOTE — PATIENT INSTRUCTIONS
Try Salon Pas patch at bedtime behind ear   Return visit 6 months with audiogram  Consider hearing aids in future

## 2023-06-14 ENCOUNTER — OFFICE VISIT (OUTPATIENT)
Dept: FAMILY MEDICINE | Facility: CLINIC | Age: 33
End: 2023-06-14
Payer: COMMERCIAL

## 2023-06-14 VITALS
SYSTOLIC BLOOD PRESSURE: 110 MMHG | RESPIRATION RATE: 16 BRPM | TEMPERATURE: 98 F | WEIGHT: 173 LBS | HEART RATE: 98 BPM | HEIGHT: 66 IN | BODY MASS INDEX: 27.8 KG/M2 | DIASTOLIC BLOOD PRESSURE: 62 MMHG | OXYGEN SATURATION: 98 %

## 2023-06-14 DIAGNOSIS — F98.8 ATTENTION DEFICIT DISORDER (ADD) WITHOUT HYPERACTIVITY: Primary | ICD-10-CM

## 2023-06-14 PROCEDURE — 99214 OFFICE O/P EST MOD 30 MIN: CPT | Performed by: NURSE PRACTITIONER

## 2023-06-14 RX ORDER — METHYLPHENIDATE HYDROCHLORIDE 10 MG/1
10 TABLET ORAL 2 TIMES DAILY PRN
Qty: 45 TABLET | Refills: 0 | Status: SHIPPED | OUTPATIENT
Start: 2023-06-14 | End: 2023-08-17

## 2023-06-14 RX ORDER — METHYLPHENIDATE HYDROCHLORIDE 10 MG/1
10 TABLET ORAL 2 TIMES DAILY PRN
Qty: 45 TABLET | Refills: 0 | Status: SHIPPED | OUTPATIENT
Start: 2023-07-13 | End: 2023-08-17

## 2023-06-14 ASSESSMENT — ASTHMA QUESTIONNAIRES
ACT_TOTALSCORE: 25
QUESTION_5 LAST FOUR WEEKS HOW WOULD YOU RATE YOUR ASTHMA CONTROL: COMPLETELY CONTROLLED
QUESTION_2 LAST FOUR WEEKS HOW OFTEN HAVE YOU HAD SHORTNESS OF BREATH: NOT AT ALL
QUESTION_1 LAST FOUR WEEKS HOW MUCH OF THE TIME DID YOUR ASTHMA KEEP YOU FROM GETTING AS MUCH DONE AT WORK, SCHOOL OR AT HOME: NONE OF THE TIME
QUESTION_3 LAST FOUR WEEKS HOW OFTEN DID YOUR ASTHMA SYMPTOMS (WHEEZING, COUGHING, SHORTNESS OF BREATH, CHEST TIGHTNESS OR PAIN) WAKE YOU UP AT NIGHT OR EARLIER THAN USUAL IN THE MORNING: NOT AT ALL
QUESTION_4 LAST FOUR WEEKS HOW OFTEN HAVE YOU USED YOUR RESCUE INHALER OR NEBULIZER MEDICATION (SUCH AS ALBUTEROL): NOT AT ALL
ACT_TOTALSCORE: 25

## 2023-06-14 ASSESSMENT — PAIN SCALES - GENERAL: PAINLEVEL: NO PAIN (0)

## 2023-06-14 ASSESSMENT — PATIENT HEALTH QUESTIONNAIRE - PHQ9
SUM OF ALL RESPONSES TO PHQ QUESTIONS 1-9: 10
SUM OF ALL RESPONSES TO PHQ QUESTIONS 1-9: 10
10. IF YOU CHECKED OFF ANY PROBLEMS, HOW DIFFICULT HAVE THESE PROBLEMS MADE IT FOR YOU TO DO YOUR WORK, TAKE CARE OF THINGS AT HOME, OR GET ALONG WITH OTHER PEOPLE: SOMEWHAT DIFFICULT

## 2023-08-17 ENCOUNTER — OFFICE VISIT (OUTPATIENT)
Dept: FAMILY MEDICINE | Facility: CLINIC | Age: 33
End: 2023-08-17
Payer: COMMERCIAL

## 2023-08-17 VITALS
WEIGHT: 173 LBS | RESPIRATION RATE: 16 BRPM | HEART RATE: 77 BPM | BODY MASS INDEX: 27.8 KG/M2 | DIASTOLIC BLOOD PRESSURE: 82 MMHG | OXYGEN SATURATION: 99 % | HEIGHT: 66 IN | TEMPERATURE: 97.9 F | SYSTOLIC BLOOD PRESSURE: 120 MMHG

## 2023-08-17 DIAGNOSIS — F98.8 ATTENTION DEFICIT DISORDER (ADD) WITHOUT HYPERACTIVITY: Primary | ICD-10-CM

## 2023-08-17 PROCEDURE — 99214 OFFICE O/P EST MOD 30 MIN: CPT | Performed by: NURSE PRACTITIONER

## 2023-08-17 RX ORDER — METHYLPHENIDATE HYDROCHLORIDE 10 MG/1
10 TABLET ORAL 2 TIMES DAILY PRN
Qty: 30 TABLET | Refills: 0 | Status: SHIPPED | OUTPATIENT
Start: 2023-09-17 | End: 2024-03-05

## 2023-08-17 RX ORDER — METHYLPHENIDATE HYDROCHLORIDE 10 MG/1
10 TABLET ORAL 2 TIMES DAILY PRN
Qty: 30 TABLET | Refills: 0 | Status: SHIPPED | OUTPATIENT
Start: 2023-08-17 | End: 2024-03-05

## 2023-08-17 RX ORDER — METHYLPHENIDATE HYDROCHLORIDE 10 MG/1
10 TABLET ORAL 2 TIMES DAILY PRN
Qty: 30 TABLET | Refills: 0 | Status: SHIPPED | OUTPATIENT
Start: 2023-09-17

## 2023-08-17 ASSESSMENT — PAIN SCALES - GENERAL: PAINLEVEL: NO PAIN (0)

## 2023-08-17 NOTE — PROGRESS NOTES
Assessment & Plan     Attention deficit disorder (ADD) without hyperactivity  Doing well on current as needed Ritalin.  [place] prescription monitoring reviewed without concerns. Will start with 30 tabs per month, can increase as needed based on this semesters school load, Sherley will let me know if 30 is not enough per month.   - methylphenidate (RITALIN) 10 MG tablet; Take 1 tablet (10 mg) by mouth 2 times daily as needed  - methylphenidate (RITALIN) 10 MG tablet; Take 1 tablet (10 mg) by mouth 2 times daily as needed  - methylphenidate (RITALIN) 10 MG tablet; Take 1 tablet (10 mg) by mouth 2 times daily as needed    BMI 27.0-27.9,adult  Comment: BMI 27 without comorbid conditions.  Recommend increasing physical activity and incorporating strength training to bring BMI down. Can consider nutrition referral if interested, Sherley declined at this time but will let me know if she changes her mind       [facility]    Subjective   Shreley is a 32 year old, presenting for the following health issues:  A.D.H.D        8/17/2023     9:13 AM   Additional Questions   Roomed by Patsy       History of Present Illness       Reason for visit:  Adhd    She eats 2-3 servings of fruits and vegetables daily.She consumes 0 sweetened beverage(s) daily.She exercises with enough effort to increase her heart rate 30 to 60 minutes per day.  She exercises with enough effort to increase her heart rate 3 or less days per week.   She is taking medications regularly.     ADHD Follow-Up    Date of last ADHD office visit: 6/14/2023  Status since last visit: taking for class/ able to focus on school work  Taking controlled (daily) medications as prescribed: Yes                       Parent/Patient Concerns with Medications: None  ADHD Medication       Stimulants - Misc. Disp Start End     methylphenidate (RITALIN) 10 MG tablet    45 tablet 6/14/2023     Sig - Route: Take 1 tablet (10 mg) by mouth  "2 times daily as needed - Oral    Class: E-Prescribe    Earliest Fill Date: 6/14/2023     methylphenidate (RITALIN) 10 MG tablet    45 tablet 7/13/2023     Sig - Route: Take 1 tablet (10 mg) by mouth 2 times daily as needed - Oral    Class: E-Prescribe    Earliest Fill Date: 7/13/2023            Medication Benefits:   Controlled symptoms: Attention span, Distractability, and Finishing tasks    Medication side effects:  Side effects noted: none    Wt Concern - Pt states she is have a hard time losing wt. Feels she eats healthy but not losing anything.  Eating a lot of fruits and veggies   Limited carbs and dairy  Plays hockey 1-2 times per week    Review of Systems   Constitutional, HEENT, cardiovascular, pulmonary, gi and gu systems are negative, except as otherwise noted.      Objective    /82 (Cuff Size: Adult Regular)   Pulse 77   Temp 97.9  F (36.6  C) (Tympanic)   Resp 16   Ht 1.676 m (5' 6\")   Wt 78.5 kg (173 lb)   LMP 08/13/2023   SpO2 99%   BMI 27.92 kg/m    Body mass index is 27.92 kg/m .  Physical Exam   GENERAL: healthy, alert and no distress  RESP: lungs clear to auscultation - no rales, rhonchi or wheezes  CV: regular rate and rhythm, normal S1 S2, no S3 or S4, no murmur  NEURO: Normal strength and tone, mentation intact and speech normal  PSYCH: mentation appears normal, affect normal/bright                "

## 2023-10-26 NOTE — PROGRESS NOTES
AUDIOLOGY REPORT     SUMMARY: Audiology visit completed. See audiogram for results.       RECOMMENDATIONS: Follow-up with ENT.    Kelsi Kilpatrick, CCC-A  [place] Licensed Audiologist #[id number]

## 2023-10-27 ENCOUNTER — OFFICE VISIT (OUTPATIENT)
Dept: OTOLARYNGOLOGY | Facility: CLINIC | Age: 33
End: 2023-10-27
Payer: COMMERCIAL

## 2023-10-27 ENCOUNTER — OFFICE VISIT (OUTPATIENT)
Dept: AUDIOLOGY | Facility: CLINIC | Age: 33
End: 2023-10-27
Payer: COMMERCIAL

## 2023-10-27 DIAGNOSIS — H90.3 ASYMMETRICAL SENSORINEURAL HEARING LOSS: Primary | ICD-10-CM

## 2023-10-27 DIAGNOSIS — H60.8X3 CHRONIC ECZEMATOUS OTITIS EXTERNA OF BOTH EARS: Primary | ICD-10-CM

## 2023-10-27 PROCEDURE — 99213 OFFICE O/P EST LOW 20 MIN: CPT | Performed by: OTOLARYNGOLOGY

## 2023-10-27 PROCEDURE — 92567 TYMPANOMETRY: CPT | Mod: 52 | Performed by: AUDIOLOGIST

## 2023-10-27 PROCEDURE — 92557 COMPREHENSIVE HEARING TEST: CPT | Performed by: AUDIOLOGIST

## 2023-10-27 NOTE — PROGRESS NOTES
CHIEF COMPLAINT:  Patient presents with:  RECHECK: 6 Month Follow up with Audio review, Fungal infection still seems to be present, itching and drainage in both ears         HISTORY OF PRESENT ILLNESS    Sherley was seen in follow up after previous 4/21/2023 visit for recurrent otitis externa.  She gets flare ups on rainy days or when her allergies flare ups.   She uses lotrisone  cream which helps but does not prevent future occurrences.  She has been tested for allergies and immunotherapy was recommended but she does not have the time for regularly shots.         REVIEW OF SYSTEMS    Review of Systems: a 10-system review is reviewed at this encounter.  See scanned document.       Allergies   Allergen Reactions    Kiwi Anaphylaxis     Throat swelling, difficulty breathing  Throat swelling, difficulty breathing    Lanolin Hives    Latex Hives    Penicillins Rash           PHYSICAL EXAM:        HEAD: Normal appearance and symmetry:  No cutaneous lesions.      EARS:   BINOCULAR MICROSCOPY     RIGHT: EAC skin is inflamed with inspissated debris   LEFT:   EAC skin is intact   Conchal skin: scaly and dry bilaterally     GV applied to both EACs under microscope  NOSE:    Dorsum:   straight       ORAL CAVITY/OROPHARYNX:    Lips:  Normal.     NECK:  Trachea:  midline     NEURO:   Alert and Oriented    GAIT AND STATION:  normal     RESPIRATORY:   Symmetry and Respiratory effort    PSYCH:   normal mood and affect    SKIN:  warm and dry         IMPRESSION:   Encounter Diagnosis   Name Primary?    Chronic eczematous otitis externa of both ears Yes            RECOMMENDATIONS:    Orders Placed This Encounter   Procedures    Adult Dermatology  Referral      No orders of the defined types were placed in this encounter.    No orders of the defined types were placed in this encounter.

## 2023-10-27 NOTE — LETTER
10/27/2023         RE: Sherley Quijano  [address] [place] [id number]        Dear Colleague,    Thank you for referring your patient, Sherley Quijano, to the [facility]. Please see a copy of my visit note below.    CHIEF COMPLAINT:  Patient presents with:  RECHECK: 6 Month Follow up with Audio review, Fungal infection still seems to be present, itching and drainage in both ears         HISTORY OF PRESENT ILLNESS    Sherley was seen in follow up after previous 4/21/2023 visit for recurrent otitis externa.  She gets flare ups on rainy days or when her allergies flare ups.   She uses lotrisone  cream which helps but does not prevent future occurrences.  She has been tested for allergies and immunotherapy was recommended but she does not have the time for regularly shots.         REVIEW OF SYSTEMS    Review of Systems: a 10-system review is reviewed at this encounter.  See scanned document.       Allergies   Allergen Reactions     Kiwi Anaphylaxis     Throat swelling, difficulty breathing  Throat swelling, difficulty breathing     Lanolin Hives     Latex Hives     Penicillins Rash           PHYSICAL EXAM:        HEAD: Normal appearance and symmetry:  No cutaneous lesions.      EARS:   BINOCULAR MICROSCOPY     RIGHT: EAC skin is inflamed with inspissated debris   LEFT:   EAC skin is intact   Conchal skin: scaly and dry bilaterally     GV applied to both EACs under microscope  NOSE:    Dorsum:   straight       ORAL CAVITY/OROPHARYNX:    Lips:  Normal.     NECK:  Trachea:  midline     NEURO:   Alert and Oriented    GAIT AND STATION:  normal     RESPIRATORY:   Symmetry and Respiratory effort    PSYCH:   normal mood and affect    SKIN:  warm and dry         IMPRESSION:   Encounter Diagnosis   Name Primary?     Chronic eczematous otitis externa of both ears Yes            RECOMMENDATIONS:    Orders Placed This Encounter   Procedures     Adult Dermatology  Referral      No orders of the  defined types were placed in this encounter.    No orders of the defined types were placed in this encounter.         Again, thank you for allowing me to participate in the care of your patient.        Sincerely,        Bora Guajardo MD

## 2023-11-21 ENCOUNTER — OFFICE VISIT (OUTPATIENT)
Dept: AUDIOLOGY | Facility: CLINIC | Age: 33
End: 2023-11-21
Payer: COMMERCIAL

## 2023-11-21 DIAGNOSIS — H90.3 ASYMMETRICAL SENSORINEURAL HEARING LOSS: Primary | ICD-10-CM

## 2023-11-21 PROCEDURE — 92591 PR HEARING AID EXAM BINAURAL: CPT | Performed by: AUDIOLOGIST

## 2023-11-21 NOTE — PROGRESS NOTES
AUDIOLOGY REPORT    SUBJECTIVE: Sherley Quijano is a 33 year old female was seen in the Audiology Clinic at  [facility] on 11/21/23 to discuss concerns with hearing and functional communication difficulties.  Sherley has been seen previously on 10/27/2023, and results revealed normal hearing 250-2000 Hz sloping to severe sensorineural hearing loss in the right ear and normal hearing 250-1500 Hz sloping to moderate sensorineural hearing loss then rising back to normal from 6-8 kHz in the left ear. The patient was medically evaluated and determined to be cleared for binaural hearing aids by Dr. Bora Guajardo in ENT.  Sherley notes difficulty with communication in a variety of listening situations.    OBJECTIVE:    Patient is a hearing aid candidate. Patient would like to move forward with a hearing aid evaluation today. Therefore, the patient was presented with different options for amplification to help aid in communication. Discussed styles, levels of technology and monaural vs. binaural fitting.     The hearing aid(s) mutually chosen were:  Binaural: Phonak Audeo Lumity 30-R NIKKI  COLOR: P1 (sand beige)  BATTERY SIZE: rechargeable  EARMOLD/TIPS: to be chosen at fitting  CANAL/ LENGTH: 1S right and left    ASSESSMENT:     Reviewed purchase information and warranty information with patient. The 45 day trial period was explained to patient. The patient was given a copy of the [facility] consumer brochure on purchasing hearing instruments. Patient risk factors have been provided to the patient in writing prior to the sale of the hearing aid per FDA regulation. The risk factors are also available in the User Instructional Booklet to be presented on the day of the hearing aid fitting. Hearing aids ordered. Hearing aid evaluation completed.    PLAN: Sherley is scheduled to return on 1/12/2024 for a hearing aid fitting and programming. Purchase agreement will be completed on that date.  Please contact this clinic with any questions or concerns.    Kelsi Kilpatrick, CCC-A  [place] Licensed Audiologist #[id number]

## 2024-01-10 ENCOUNTER — TELEPHONE (OUTPATIENT)
Dept: OTOLARYNGOLOGY | Facility: CLINIC | Age: 34
End: 2024-01-10
Payer: COMMERCIAL

## 2024-01-10 DIAGNOSIS — B36.9 OTITIS EXTERNA, FUNGAL, RIGHT EAR: Primary | ICD-10-CM

## 2024-01-10 DIAGNOSIS — H62.41 OTITIS EXTERNA, FUNGAL, RIGHT EAR: Primary | ICD-10-CM

## 2024-01-10 RX ORDER — CIPROFLOXACIN AND DEXAMETHASONE 3; 1 MG/ML; MG/ML
4 SUSPENSION/ DROPS AURICULAR (OTIC) 2 TIMES DAILY
Qty: 7.5 ML | Refills: 0 | Status: SHIPPED | OUTPATIENT
Start: 2024-01-10 | End: 2024-01-17

## 2024-01-10 NOTE — TELEPHONE ENCOUNTER
Attempted to call pt and left a VM to call back about symptoms.    [facility]      Yu Overton  RN, BSN  [facility]  ENT  [address]  [address]  [place] [address]  Office:[phone]  Employed by [facility]

## 2024-01-10 NOTE — TELEPHONE ENCOUNTER
[facility] Call Center    Phone Message    May a detailed message be left on voicemail: yes     Reason for Call: Other: Change in symptoms.  Pt has puss and blood in her right ear and the pain level is at a 9.  Please call pt back to discuss, she has a return visit on 02/02/24.  [place] location  Thanks     Action Taken: Other: ENT    Travel Screening: Not Applicable

## 2024-01-10 NOTE — TELEPHONE ENCOUNTER
Spoke with Sherley about right ear issues starting Monday.  PT is having pus form the ear.  Thr right ear feels full and plugged.  There has been some blood  but pt might think this is from a sore in the ear.  PT feels that her hearing sounds like under water.  Having a lot of pain.  PT has been taking Studfed but that has not been helping.  Let pt know that I would send a message to Dr. Guajardo and get back to her.    [facility] [place]      Yu Overton  RN, BSN  [facility] [place]  ENT  [address]  [address]  [place] [address]  Office:[phone]  Employed by [facility]

## 2024-01-10 NOTE — TELEPHONE ENCOUNTER
Spoke with Sherley about her right ear.  Per Dr. Guajardo sent a prescription of Cipro ear drops to pt pharmacy.  Pt expressed understanding.    [facility]      Yu Overton  RN, BSN  [facility]  ENT  [facility] [address]  Office:[phone]  Employed by [facility]

## 2024-01-12 ENCOUNTER — OFFICE VISIT (OUTPATIENT)
Dept: OTOLARYNGOLOGY | Facility: CLINIC | Age: 34
End: 2024-01-12
Payer: COMMERCIAL

## 2024-01-12 ENCOUNTER — OFFICE VISIT (OUTPATIENT)
Dept: AUDIOLOGY | Facility: CLINIC | Age: 34
End: 2024-01-12
Payer: COMMERCIAL

## 2024-01-12 DIAGNOSIS — H62.41 OTITIS EXTERNA, FUNGAL, RIGHT EAR: ICD-10-CM

## 2024-01-12 DIAGNOSIS — B36.9 OTITIS EXTERNA, FUNGAL, RIGHT EAR: ICD-10-CM

## 2024-01-12 DIAGNOSIS — H92.11 PURULENT OTORRHEA OF RIGHT EAR: Primary | ICD-10-CM

## 2024-01-12 DIAGNOSIS — H90.3 ASYMMETRICAL SENSORINEURAL HEARING LOSS: Primary | ICD-10-CM

## 2024-01-12 DIAGNOSIS — H60.391 INFECTIVE OTITIS EXTERNA, RIGHT: ICD-10-CM

## 2024-01-12 PROCEDURE — 99213 OFFICE O/P EST LOW 20 MIN: CPT | Performed by: OTOLARYNGOLOGY

## 2024-01-12 PROCEDURE — V5010 ASSESSMENT FOR HEARING AID: HCPCS | Performed by: AUDIOLOGIST

## 2024-01-12 RX ORDER — AMPHOTERICIN B
POWDER (GRAM) MISCELLANEOUS
Qty: 3 G | Refills: 0 | Status: SHIPPED | OUTPATIENT
Start: 2024-01-12 | End: 2024-03-05

## 2024-01-12 NOTE — PROGRESS NOTES
Patient was here for hearing aid fitting today. She reports that she has an active ear infection and that someone was supposed to let me know so that I could figure out if we should do the hearing aid fitting or not. I did not receive a message about this. Otoscopy revealed active ear infection of right ear. Dr. Guajardo was able to fit her in today for cleaning and prescribed otic powder for 3 weeks. He states we should be good to fit the hearing aids in a month.     Hearing aid fitting rescheduled for a month. Recommend she utilize [employer] for her hearing aids as well due to frequent ear infections. Told her we will talk about this at her fitting again as well.     No charge visit today.  Patient will return on 2/15/2024 for hearing aid fitting.  Trial extended on hearing aids until April 2024.

## 2024-01-12 NOTE — PROGRESS NOTES
CHIEF COMPLAINT:  Patient presents with:  Ear Problem: Sunday her rt ear started to hurt and the next day pus and drainage and still really hurt and was really worse by Wednesday and was prescribed drops from our clinic which are helping with the pain, but she feels off balance and like people are talking under water and she states her jaw is feeling like it is locking up and her rt side of her tonsil is swollen and painful          HISTORY OF PRESENT ILLNESS    Sherley was seen in follow up after previous 10/27/2023 visit for         REVIEW OF SYSTEMS    Review of Systems: a 10-system review is reviewed at this encounter.  See scanned document.       Allergies   Allergen Reactions    Kiwi Anaphylaxis     Throat swelling, difficulty breathing  Throat swelling, difficulty breathing    Lanolin Hives    Latex Hives    Penicillins Rash           PHYSICAL EXAM:        HEAD: Normal appearance and symmetry:  No cutaneous lesions.      EAC DEBRIDEMEN       RIGHT: EAC and TM filled with caseous debris (removed with #5 suction); Boric acid powder applied      NOSE:    Dorsum:   straight       ORAL CAVITY/OROPHARYNX:    Lips:  Normal.     NECK:  Trachea:  midline     NEURO:   Alert and Oriented    GAIT AND STATION:  normal     RESPIRATORY:   Symmetry and Respiratory effort    PSYCH:   normal mood and affect    SKIN:  warm and dry         IMPRESSION:   Encounter Diagnoses   Name Primary?    Purulent otorrhea of right ear Yes    Infective otitis externa, right     Otitis externa, fungal, right ear             RECOMMENDATIONS:    Orders Placed This Encounter   Procedures    TX EAR DEBRIDEMENT      Orders Placed This Encounter   Medications    Amphotericin B 905 UNIT/MG POWD     Sig: CSF/HC otic powder 1 puff to affected ear twice daily for 3 weeks     Dispense:  3 g     Refill:  0     Dispense 3 capsules NO INSUFFLATOR     She will be okay to be fitted in 1 month  Water precautions

## 2024-01-12 NOTE — LETTER
1/12/2024         RE: Sherley Quijano  [address] [facility] [address]        Dear Colleague,    Thank you for referring your patient, Sherley Quijano, to the [facility]. Please see a copy of my visit note below.    CHIEF COMPLAINT:  Patient presents with:  Ear Problem: Sunday her rt ear started to hurt and the next day pus and drainage and still really hurt and was really worse by Wednesday and was prescribed drops from our clinic which are helping with the pain, but she feels off balance and like people are talking under water and she states her jaw is feeling like it is locking up and her rt side of her tonsil is swollen and painful          HISTORY OF PRESENT ILLNESS    Sherley was seen in follow up after previous 10/27/2023 visit for         REVIEW OF SYSTEMS    Review of Systems: a 10-system review is reviewed at this encounter.  See scanned document.       Allergies   Allergen Reactions     Kiwi Anaphylaxis     Throat swelling, difficulty breathing  Throat swelling, difficulty breathing     Lanolin Hives     Latex Hives     Penicillins Rash           PHYSICAL EXAM:        HEAD: Normal appearance and symmetry:  No cutaneous lesions.      EAC DEBRIDEMEN       RIGHT: EAC and TM filled with caseous debris (removed with #5 suction); Boric acid powder applied      NOSE:    Dorsum:   straight       ORAL CAVITY/OROPHARYNX:    Lips:  Normal.     NECK:  Trachea:  midline     NEURO:   Alert and Oriented    GAIT AND STATION:  normal     RESPIRATORY:   Symmetry and Respiratory effort    PSYCH:   normal mood and affect    SKIN:  warm and dry         IMPRESSION:   Encounter Diagnoses   Name Primary?     Purulent otorrhea of right ear Yes     Infective otitis externa, right      Otitis externa, fungal, right ear             RECOMMENDATIONS:    Orders Placed This Encounter   Procedures     MS EAR DEBRIDEMENT      Orders Placed This Encounter   Medications     Amphotericin B 905 UNIT/MG POWD     Sig:  CSF/HC otic powder 1 puff to affected ear twice daily for 3 weeks     Dispense:  3 g     Refill:  0     Dispense 3 capsules NO INSUFFLATOR     She will be okay to be fitted in 1 month  Water precautions      Again, thank you for allowing me to participate in the care of your patient.        Sincerely,        Bora Guajardo MD

## 2024-01-28 ENCOUNTER — HEALTH MAINTENANCE LETTER (OUTPATIENT)
Age: 34
End: 2024-01-28

## 2024-02-02 ENCOUNTER — OFFICE VISIT (OUTPATIENT)
Dept: OTOLARYNGOLOGY | Facility: CLINIC | Age: 34
End: 2024-02-02
Payer: COMMERCIAL

## 2024-02-02 DIAGNOSIS — H60.8X3 CHRONIC ECZEMATOUS OTITIS EXTERNA OF BOTH EARS: Primary | ICD-10-CM

## 2024-02-02 DIAGNOSIS — B36.9 CHRONIC FUNGAL OTITIS EXTERNA: ICD-10-CM

## 2024-02-02 DIAGNOSIS — H62.40 CHRONIC FUNGAL OTITIS EXTERNA: ICD-10-CM

## 2024-02-02 PROCEDURE — 99213 OFFICE O/P EST LOW 20 MIN: CPT | Performed by: OTOLARYNGOLOGY

## 2024-02-02 NOTE — LETTER
2/2/2024         RE: Sherley Quijano  [address] [facility] [address]        Dear Colleague,    Thank you for referring your patient, Sherley Quijano, to the [facility]. Please see a copy of my visit note below.    CHIEF COMPLAINT:  No chief complaint on file.         HISTORY OF PRESENT ILLNESS    Sherley was seen in follow up after previous 1/12/2024 visit for recheck of RIGHT ear after CSF otic powder.  She is still using the powder.  Ear feels much better.  She is wondering why she keeps getting these infections.  She is keeping her ears dry.         REVIEW OF SYSTEMS    Review of Systems: a 10-system review is reviewed at this encounter.  See scanned document.       Allergies   Allergen Reactions     Kiwi Anaphylaxis     Throat swelling, difficulty breathing  Throat swelling, difficulty breathing     Lanolin Hives     Latex Hives     Penicillins Rash           PHYSICAL EXAM:        HEAD: Normal appearance and symmetry:  No cutaneous lesions.      EARS:        RIGHT:  otic powder is visibile on TM and EAC (some black hyphae seen on eAC      Desqumated debris is removed with # 3 suction from EAC.     NOSE:    Dorsum:   straight       ORAL CAVITY/OROPHARYNX:    Lips:  Normal.     NECK:  Trachea:  midline     NEURO:   Alert and Oriented    GAIT AND STATION:  normal     RESPIRATORY:   Symmetry and Respiratory effort    PSYCH:   normal mood and affect    SKIN:  warm and dry         IMPRESSION:   Encounter Diagnosis   Name Primary?     Chronic eczematous otitis externa of both ears Yes            RECOMMENDATIONS:    Orders Placed This Encounter   Procedures     MD EAR DEBRIDEMENT     Adult Infectious Disease  Referral        Return visit 3 months for recheck.  ID referral to rule out immune deficiency        Again, thank you for allowing me to participate in the care of your patient.        Sincerely,        Bora Guajardo MD

## 2024-02-02 NOTE — PROGRESS NOTES
CHIEF COMPLAINT:  No chief complaint on file.         HISTORY OF PRESENT ILLNESS    Sherley was seen in follow up after previous 1/12/2024 visit for recheck of RIGHT ear after CSF otic powder.  She is still using the powder.  Ear feels much better.  She is wondering why she keeps getting these infections.  She is keeping her ears dry.         REVIEW OF SYSTEMS    Review of Systems: a 10-system review is reviewed at this encounter.  See scanned document.       Allergies   Allergen Reactions    Kiwi Anaphylaxis     Throat swelling, difficulty breathing  Throat swelling, difficulty breathing    Lanolin Hives    Latex Hives    Penicillins Rash           PHYSICAL EXAM:        HEAD: Normal appearance and symmetry:  No cutaneous lesions.      EARS:        RIGHT:  otic powder is visibile on TM and EAC (some black hyphae seen on eAC      Desqumated debris is removed with # 3 suction from EAC.     NOSE:    Dorsum:   straight       ORAL CAVITY/OROPHARYNX:    Lips:  Normal.     NECK:  Trachea:  midline     NEURO:   Alert and Oriented    GAIT AND STATION:  normal     RESPIRATORY:   Symmetry and Respiratory effort    PSYCH:   normal mood and affect    SKIN:  warm and dry         IMPRESSION:   Encounter Diagnosis   Name Primary?    Chronic eczematous otitis externa of both ears Yes            RECOMMENDATIONS:    Orders Placed This Encounter   Procedures    MD EAR DEBRIDEMENT    Adult Infectious Disease  Referral        Return visit 3 months for recheck.  ID referral to rule out immune deficiency

## 2024-02-15 ENCOUNTER — OFFICE VISIT (OUTPATIENT)
Dept: AUDIOLOGY | Facility: CLINIC | Age: 34
End: 2024-02-15
Payer: COMMERCIAL

## 2024-02-15 DIAGNOSIS — H90.3 ASYMMETRICAL SENSORINEURAL HEARING LOSS: Primary | ICD-10-CM

## 2024-02-15 PROCEDURE — V5261 HEARING AID, DIGIT, BIN, BTE: HCPCS | Performed by: AUDIOLOGIST

## 2024-02-15 PROCEDURE — V5160 DISPENSING FEE BINAURAL: HCPCS | Performed by: AUDIOLOGIST

## 2024-02-15 PROCEDURE — V5011 HEARING AID FITTING/CHECKING: HCPCS | Mod: RT | Performed by: AUDIOLOGIST

## 2024-02-15 PROCEDURE — V5020 CONFORMITY EVALUATION: HCPCS | Mod: RT | Performed by: AUDIOLOGIST

## 2024-02-15 PROCEDURE — 92593 PR HEARING AID CHECK, BINAURAL: CPT | Performed by: AUDIOLOGIST

## 2024-02-15 NOTE — PATIENT INSTRUCTIONS

## 2024-02-15 NOTE — PROGRESS NOTES
AUDIOLOGY REPORT - HEARING AID FITTING    SUBJECTIVE: Sherley Quijano, a 33 year old female, was seen in the Audiology Clinic at [facility] [place] today for a hearing aid fitting. Sherley has been seen previously on 10/27/2023, and results revealed normal hearing 250-2000 Hz sloping to severe sensorineural hearing loss in the right ear and normal hearing 250-1500 Hz sloping to moderate sensorineural hearing loss then rising back to normal from 6-8 kHz in the left ear. The patient was medically evaluated and determined to be cleared for binaural hearing aids by Dr. Bora Guajardo in ENT.      OBJECTIVE:  Prior to fitting, a hearing aid check was performed to ensure device functionality. The hearing aid conformity evaluation was completed.The hearing aids were placed and they provided a good fit. Real-ear-probe-microphone measurements were completed on the [employer] system and were a good match to NAL-NL1 target with soft sounds audible, moderate sounds comfortable, and loud sounds below discomfort. UCLs are verified through maximum power output measures and demonstrate appropriate limiting of loud inputs. Ms. Quijano was oriented to proper hearing aid use, care, cleaning (no water, dry brush), batteries (size rechargeable, toxicity, low-battery signal), aid insertion/removal, user booklet, warranty information, storage cases, and other hearing aid details. The patient confirmed understanding of hearing aid use and care, and showed proper insertion of hearing aid and batteries while in the office today. Ms. Quijano reported good volume and sound quality today.    She is set at 80% of targets and right gain down overall 2 more clicks per patient request. She has a volume control. Will teach her how to change domes and wax traps at next visit. Will also pair phone at next visit.    EAR(S) FIT:  Binaural  HEARING AID MAKE: Right: Phonak ; Left: Phonak     HEARING AID MODEL #: Right: Audeo Lumity 30-R ; Left:  Audeo Lumity  30-R   HEARING AID STYLE: Right: NIKKI  ; Left: NIKKI  DOME SIZE: Right: medium open  ; Left::   medium open   LENGTH: Right: 1S  5.0  ; Left:   1S 5.0    SERIAL NUMBERS: Right: [id number]  ; Left:  [id number]   WARRANTY END DATE: Right: 3/19/2027  ; Left:: 3/19/2027        The patient Does Not Require a signed a waiver that is on file agreeing to the upgrade charge, per their insurance contract.        ASSESSMENT: Binaural hearing aid fitting completed today. Verification measures were performed. The 45 day trial period was explained to patient, and they expressed understanding. Ms. Quijano signed the Hearing Aid Purchase Agreement and was given a copy, as well as details on her hearing aids. Patient was counseled that exact out of pocket amounts cannot be determined for hearing aid claims being sent to insurance. Any insurance coverage information presented to the patient is an estimate only, and is not a guarantee of payment. Patient has been advised to check with their own insurance.    PLAN: Ms. Quijano will return for follow-up in 2-3 weeks for a hearing aid review appointment. Please call this clinic with questions regarding today s appointment.    Lois Kilpatrick., CCC-A  [place] Licensed Audiologist #[id number]

## 2024-03-04 ENCOUNTER — OFFICE VISIT (OUTPATIENT)
Dept: AUDIOLOGY | Facility: CLINIC | Age: 34
End: 2024-03-04
Payer: COMMERCIAL

## 2024-03-04 DIAGNOSIS — H90.3 SENSORINEURAL HEARING LOSS, BILATERAL: Primary | ICD-10-CM

## 2024-03-04 PROCEDURE — V5010 ASSESSMENT FOR HEARING AID: HCPCS | Performed by: AUDIOLOGIST

## 2024-03-05 ENCOUNTER — OFFICE VISIT (OUTPATIENT)
Dept: INFECTIOUS DISEASES | Facility: CLINIC | Age: 34
End: 2024-03-05
Payer: COMMERCIAL

## 2024-03-05 ENCOUNTER — LAB (OUTPATIENT)
Dept: LAB | Facility: CLINIC | Age: 34
End: 2024-03-05
Payer: COMMERCIAL

## 2024-03-05 VITALS
HEART RATE: 89 BPM | OXYGEN SATURATION: 98 % | SYSTOLIC BLOOD PRESSURE: 110 MMHG | WEIGHT: 167.2 LBS | TEMPERATURE: 98.2 F | DIASTOLIC BLOOD PRESSURE: 74 MMHG | BODY MASS INDEX: 26.99 KG/M2

## 2024-03-05 DIAGNOSIS — B36.9 CHRONIC FUNGAL OTITIS EXTERNA: Primary | ICD-10-CM

## 2024-03-05 DIAGNOSIS — B36.9 CHRONIC FUNGAL OTITIS EXTERNA: ICD-10-CM

## 2024-03-05 DIAGNOSIS — H62.40 CHRONIC FUNGAL OTITIS EXTERNA: ICD-10-CM

## 2024-03-05 DIAGNOSIS — H62.40 CHRONIC FUNGAL OTITIS EXTERNA: Primary | ICD-10-CM

## 2024-03-05 LAB — HIV 1+2 AB+HIV1 P24 AG SERPL QL IA: NONREACTIVE

## 2024-03-05 PROCEDURE — 82784 ASSAY IGA/IGD/IGG/IGM EACH: CPT

## 2024-03-05 PROCEDURE — 36415 COLL VENOUS BLD VENIPUNCTURE: CPT

## 2024-03-05 PROCEDURE — 86160 COMPLEMENT ANTIGEN: CPT

## 2024-03-05 PROCEDURE — 86162 COMPLEMENT TOTAL (CH50): CPT | Mod: 90

## 2024-03-05 PROCEDURE — 82784 ASSAY IGA/IGD/IGG/IGM EACH: CPT | Mod: 59

## 2024-03-05 PROCEDURE — 99204 OFFICE O/P NEW MOD 45 MIN: CPT | Performed by: STUDENT IN AN ORGANIZED HEALTH CARE EDUCATION/TRAINING PROGRAM

## 2024-03-05 PROCEDURE — 99000 SPECIMEN HANDLING OFFICE-LAB: CPT

## 2024-03-05 PROCEDURE — 87389 HIV-1 AG W/HIV-1&-2 AB AG IA: CPT

## 2024-03-05 NOTE — PROGRESS NOTES
AUDIOLOGY REPORT    HEARING AID INITIAL CHECK    SUBJECTIVE:Sherley Quijano is a 33 year old female who was seen in the Audiology Clinic at the [facility] on 3/4/2024  for a hearing aid check. Sherley has been seen previously on 10/27/2023, and results revealed normal hearing 250-2000 Hz sloping to severe sensorineural hearing loss in the right ear and normal hearing 250-1500 Hz sloping to moderate sensorineural hearing loss then rising back to normal from 6-8 kHz in the left ear. The patient was medically evaluated and determined to be cleared for binaural hearing aids by Dr. Bora Guajardo in ENT.       OBJECTIVE:     Otoscopy revealed clear ear canals, but sores at the top of each tragus. Patient is going to be seeing a different physician to see if she can get answers as to why her ears are so irritated, cracked, and painful. She has tried so many different things and has had it addressed with ENT as well with no relief. The condition will clear for a little while but will recur.     Based on patient report, the following changes were made:    Patient reports the right hearing aid  is causing a little discomfort where her ear is cracked by the tragus. She will pull it out slightly which helps. The domes are comfortable.  She has been wearing them on average 8 hours per day. She does find benefit with them. She feels her hearing is dull without them.   She complains of the tinny sound quality still.  I took her to 85% of targets to give more access to speech and took high frequencies down a bit for the tinny quality. She is happy with this change.  Taught her how to change domes and wax traps and gave extras today.   Paired her cell phone and practiced with phone calls and the jaquelin. She is happy with this.    No charge visit today (in warranty hearing aid check).    ASSESSMENT: Hearing aid check was completed today. Changes to hearing aid was completed as outlined above.     PLAN:Sherley roberson  return for follow-up on 4/15/2024 to make sure she is doing ok. Please call this clinic with any questions regarding today s appointment.    Kelsi Kilpatrick, CCC-A  [place] Licensed Audiologist #[id number]

## 2024-03-05 NOTE — PROGRESS NOTES
[facility] new patient note.    Name: Sherley Quijano  :   1990  MRN:   [id number]  PCP:    Cyndee Rizzo  DOS:    24      CC: Concern for fungal ear infections.    HPI/Interval History:  Sherley Quijano is a 33 year old old female with the history of eczema and history of recurrent infections.  With sinus infection, strep throat.  For the last 3 years, the patient has had episodes of itching, flaking, dryness of both ears.  This will at times get complicated with bilateral ear drainage of yellow sometimes green drainage.  Last episode was about a month ago.  She has never had any cultures of this done.  Has been getting amphotericin B powder in the ear.  She is also noted to have bilateral hearing loss that she is associated with shooting guns without wearing proper protection's.  She has 1 sibling but does not speak to her and as a result does not know if she has any history of recurrent infections.    ===========================  Past Medical History:  Past Medical History:   Diagnosis Date    Chickenpox     Chlamydia     age 18       Past Surgical History:  Past Surgical History:   Procedure Laterality Date    COLONOSCOPY N/A 2021    Procedure: COLONOSCOPY WITH BIOPSY;  Surgeon: Abel Calvert DO;  Location: [facility]    D & C  2006    TAB    MOUTH SURGERY  2008    wisdom teeth       Social History:  Social History     Socioeconomic History    Marital status:      Spouse name: Not on file    Number of children: 0    Years of education: 12.5+    Highest education level: Not on file   Occupational History    Occupation:      Employer: [employer]    Occupation: [employer]      Employer: [employer]   Tobacco Use    Smoking status: Former     Packs/day: .25     Types: Cigarettes     Quit date: 10/2017     Years since quittin.4    Smokeless tobacco: Never    Tobacco comments:     using e-cig daily   Vaping Use    Vaping Use: Every day    Substances: Nicotine,  Flavoring    Devices: Refillable tank   Substance and Sexual Activity    Alcohol use: Yes     Comment: twice a week    Drug use: Yes     Types: Marijuana    Sexual activity: Yes     Partners: Male     Birth control/protection: Male Surgical   Other Topics Concern    Parent/sibling w/ CABG, MI or angioplasty before 65F 55M? No   Social History Narrative    April 16, 2018    ENVIRONMENTAL HISTORY: [place] The home is heated with a wood stove and radiant heat. They do not have central air conditioning. The patient's bedroom is furnished with hard sánchez in bedroom, allergen mattress cover, allergen pillowcase cover, fabric window coverings and 2 rugs.  Pets inside the house include 1 cat. There is  history of cockroach or mice infestation. There is 1 smoker in the house.  The house does not have a damp basement.     Social Determinants of Health     Financial Resource Strain: Not on file   Food Insecurity: Not on file   Transportation Needs: Not on file   Physical Activity: Not on file   Stress: Not on file   Social Connections: Not on file   Interpersonal Safety: Not on file   Housing Stability: Not on file       Family Medical History:  Family History   Problem Relation Age of Onset    C.A.D. Maternal Grandfather         MI    Heart Disease Maternal Grandfather     Breast Cancer Maternal Grandmother     Depression Maternal Grandmother     Thyroid Disease Maternal Grandmother     Breast Cancer Paternal Grandmother     Alcohol/Drug Father         alcohol-recovered    Allergies Father     Allergies Sister     Neurologic Disorder Sister         Narcolepsy    Substance Abuse Sister         recovered drugs    Substance Abuse Mother         recovered alcohol    Depression Mother     Thyroid Disease Mother     Breast Cancer Mother     Other - See Comments Other         Heart Attack       Allergies:     Allergies   Allergen Reactions    Kiwi Anaphylaxis     Throat swelling,  difficulty breathing  Throat swelling, difficulty breathing    Lanolin Hives    Latex Hives    Penicillins Rash       Medications:  Current Outpatient Medications   Medication Sig Dispense Refill    Amphotericin B 905 UNIT/MG POWD CSF/HC otic powder 1 puff to RIGHT ear twice daily for 3 weeks 3 g 0    budesonide (PULMICORT) 0.5 MG/2ML neb solution       cyclobenzaprine (FLEXERIL) 10 MG tablet Take 1 tablet (10 mg) by mouth 3 times daily as needed for muscle spasms 30 tablet 0    fluocinolone acetonide 0.01 % OIL 2-3 drops to affected ear every other night as needed for itching 20 mL 3    ketoconazole (NIZORAL) 2 % external cream Apply topically daily To opening of external ear 15 g 1    methylphenidate (RITALIN) 10 MG tablet Take 1 tablet (10 mg) by mouth 2 times daily as needed 30 tablet 0       Immunizations:  Immunization History   Administered Date(s) Administered    DTAP (<7y) 01/03/1991, 04/18/1991, 07/01/1991, 09/15/1992, 05/06/1996    HEPA 03/30/2007, 02/12/2008    HPV 03/30/2007, 02/12/2008    HepB 07/01/2003, 09/25/2003, 09/03/2004    Influenza (IIV3) PF 02/12/2008    MMR 09/15/1992, 07/01/2003    OPV, trivalent, live 01/03/1991, 04/18/1991, 09/15/1992, 05/06/1996    TD,PF 7+ (Tenivac) 07/01/2003    TDAP Vaccine (Adacel) 05/31/2011, 06/15/2016    Varicella Pt Report Hx of Varicella/Chicken Pox 04/01/1998       ==================    Review of System:  12 points review of system is negative except for findings in the HPI.    Exam  VS: /74   Pulse 89   Temp 98.2  F (36.8  C) (Oral)   Wt 75.8 kg (167 lb 3.2 oz)   SpO2 98%   BMI 26.99 kg/m      Gen: Pleasant in no acute distress.  HEENT: NCAT.  Ears canals with amphotericin B powder  Neck: No LAD.  Lungs: Clear to auscultation bilaterally with no crackles or wheezes.   Card: RRR. No RMG. Peripheral pulses present and symmetrical. No edema.   Abd: Soft NT ND. No hepatomegaly or splenomegaly.  Ext: No edema  Lymph: No cervical or supraclavicular  adenopathy.  Skin: No rash  Neuro: Alert and oriented to place time and person. Cranial nerves grossly intact.     Labs:  Reviewed    Imaging:  Reviewed    Assessment:  Sherley Quijano is a 33 year old old female with history of recurrent ear and throat infection since childhood now with 3 years of dry, flaky, itchy ears complicated at times with bilateral ear drainage.  I suspect the patient has unusual location of eczema in the ears that get secondarily infected with either bacterial or fungal infection.  No drainage currently.      Recommendations:  Eczema care.  Standing order of bacterial and fungal ear cultures placed bilaterally.  Patient will call nursing staff for swab of the drainage for cultures when she gets drainage.  Screening for immunodeficiency with HIV, hypogammaglobulinemia, complement deficits.  Orders placed.      Willa Jolley MD  [facility]  Formerly [facility]  Office Telephone [phone].  Fax [phone]  [facility] paging

## 2024-03-06 ENCOUNTER — OFFICE VISIT (OUTPATIENT)
Dept: FAMILY MEDICINE | Facility: CLINIC | Age: 34
End: 2024-03-06
Payer: COMMERCIAL

## 2024-03-06 ENCOUNTER — ANCILLARY PROCEDURE (OUTPATIENT)
Dept: GENERAL RADIOLOGY | Facility: CLINIC | Age: 34
End: 2024-03-06
Attending: NURSE PRACTITIONER
Payer: COMMERCIAL

## 2024-03-06 VITALS
SYSTOLIC BLOOD PRESSURE: 110 MMHG | WEIGHT: 169 LBS | HEIGHT: 66 IN | BODY MASS INDEX: 27.16 KG/M2 | OXYGEN SATURATION: 97 % | RESPIRATION RATE: 16 BRPM | DIASTOLIC BLOOD PRESSURE: 64 MMHG | HEART RATE: 87 BPM

## 2024-03-06 DIAGNOSIS — M25.511 CHRONIC RIGHT SHOULDER PAIN: ICD-10-CM

## 2024-03-06 DIAGNOSIS — G89.29 CHRONIC RIGHT SHOULDER PAIN: ICD-10-CM

## 2024-03-06 DIAGNOSIS — F98.8 ATTENTION DEFICIT DISORDER, UNSPECIFIED HYPERACTIVITY PRESENCE: Primary | ICD-10-CM

## 2024-03-06 LAB
C3 SERPL-MCNC: 115 MG/DL (ref 81–157)
C4 SERPL-MCNC: 23 MG/DL (ref 13–39)
IGA SERPL-MCNC: 153 MG/DL (ref 84–499)
IGG SERPL-MCNC: 889 MG/DL (ref 610–1616)
IGM SERPL-MCNC: 129 MG/DL (ref 35–242)

## 2024-03-06 PROCEDURE — 99214 OFFICE O/P EST MOD 30 MIN: CPT | Performed by: NURSE PRACTITIONER

## 2024-03-06 PROCEDURE — 73030 X-RAY EXAM OF SHOULDER: CPT | Mod: TC | Performed by: RADIOLOGY

## 2024-03-06 RX ORDER — METHYLPHENIDATE HYDROCHLORIDE 10 MG/1
10 TABLET ORAL 2 TIMES DAILY
Qty: 30 TABLET | Refills: 0 | Status: SHIPPED | OUTPATIENT
Start: 2024-03-06 | End: 2024-04-05

## 2024-03-06 ASSESSMENT — ASTHMA QUESTIONNAIRES
QUESTION_3 LAST FOUR WEEKS HOW OFTEN DID YOUR ASTHMA SYMPTOMS (WHEEZING, COUGHING, SHORTNESS OF BREATH, CHEST TIGHTNESS OR PAIN) WAKE YOU UP AT NIGHT OR EARLIER THAN USUAL IN THE MORNING: NOT AT ALL
QUESTION_2 LAST FOUR WEEKS HOW OFTEN HAVE YOU HAD SHORTNESS OF BREATH: NOT AT ALL
ACT_TOTALSCORE: 25
QUESTION_5 LAST FOUR WEEKS HOW WOULD YOU RATE YOUR ASTHMA CONTROL: COMPLETELY CONTROLLED
QUESTION_4 LAST FOUR WEEKS HOW OFTEN HAVE YOU USED YOUR RESCUE INHALER OR NEBULIZER MEDICATION (SUCH AS ALBUTEROL): NOT AT ALL
ACT_TOTALSCORE: 25
QUESTION_1 LAST FOUR WEEKS HOW MUCH OF THE TIME DID YOUR ASTHMA KEEP YOU FROM GETTING AS MUCH DONE AT WORK, SCHOOL OR AT HOME: NONE OF THE TIME

## 2024-03-06 ASSESSMENT — PAIN SCALES - GENERAL: PAINLEVEL: EXTREME PAIN (8)

## 2024-03-06 NOTE — PROGRESS NOTES
"  Assessment & Plan     Attention deficit disorder, unspecified hyperactivity presence  Stable with current medication.  [place] prescription monitoring reviewed without concerns.  - methylphenidate (RITALIN) 10 MG tablet; Take 1 tablet (10 mg) by mouth 2 times daily for 30 days    Chronic right shoulder pain  No acute distress.  With ongoing symptoms x-ray was completed today which was normal.   Ortho referral placed for further evaluation plan.  - XR Shoulder Right 2 Views; Future  - Orthopedic  Referral; Future    Subjective   Sherley is a 33 year old, presenting for the following health issues:  Musculoskeletal Problem        3/6/2024     9:19 AM   Additional Questions   Roomed by RAMY Garner     History of Present Illness       Reason for visit:  Shoulder injury  Symptom onset:  More than a month  Symptoms include:  Pain  Symptom intensity:  Severe  Symptom progression:  Staying the same  Had these symptoms before:  No  What makes it worse:  Movement  What makes it better:  Ice    She eats 4 or more servings of fruits and vegetables daily.She consumes 0 sweetened beverage(s) daily.She exercises with enough effort to increase her heart rate 10 to 19 minutes per day.  She exercises with enough effort to increase her heart rate 3 or less days per week.   She is taking medications regularly.  Right shoulder. Saw Chiropractor and after ROM is poor. Injured while playing hockey.  Dislocated- was able to get partially back in and the chiropractor got back in the rest of the way  Has struggled since with ROM and pain  Ice helps a little  Massage     Uses Reglan as needed when in class  Generally 3 times per week  No side effects from medication    Review of Systems  Constitutional, HEENT, cardiovascular, pulmonary, gi and gu systems are negative, except as otherwise noted.      Objective    /64 (Cuff Size: Adult Large)   Pulse 87   Resp 16   Ht 1.676 m (5' 6\")   Wt 76.7 kg (169 lb)   LMP 02/27/2024 " (Approximate)   SpO2 97%   BMI 27.28 kg/m    Body mass index is 27.28 kg/m .  Physical Exam   GENERAL: alert and no distress  RESP: lungs clear to auscultation - no rales, rhonchi or wheezes  CV: regular rate and rhythm, normal S1 S2, no S3 or S4, no murmur, click or rub, no peripheral edema  MS: Right shoulder abduction to 90, flexion to 90, internal rotation to hip, decreased strength   NEURO: Normal strength and tone, mentation intact and speech normal  PSYCH: mentation appears normal, affect normal/bright    Results for orders placed or performed in visit on 03/06/24   XR Shoulder Right 2 Views     Status: None    Narrative    XR SHOULDER RIGHT 2 VIEWS 3/6/2024 10:26 AM     HISTORY: Chronic right shoulder pain; Chronic right shoulder pain    COMPARISON: None.         Impression    IMPRESSION: The right glenohumeral and acromioclavicular joints are  negative for fracture or dislocation.    ARVIN AMOR MD         SYSTEM ID:  [id number]           Signed Electronically by: MASHA Monroy CNP

## 2024-03-07 LAB — CH50 SERPL-ACNC: 23.8 U/ML

## 2024-03-15 ENCOUNTER — ALLIED HEALTH/NURSE VISIT (OUTPATIENT)
Dept: INFECTIOUS DISEASES | Facility: CLINIC | Age: 34
End: 2024-03-15
Payer: COMMERCIAL

## 2024-03-15 ENCOUNTER — TELEPHONE (OUTPATIENT)
Dept: INFECTIOUS DISEASES | Facility: CLINIC | Age: 34
End: 2024-03-15

## 2024-03-15 DIAGNOSIS — H62.40 CHRONIC FUNGAL OTITIS EXTERNA: Primary | ICD-10-CM

## 2024-03-15 DIAGNOSIS — B36.9 CHRONIC FUNGAL OTITIS EXTERNA: Primary | ICD-10-CM

## 2024-03-15 PROCEDURE — 87205 SMEAR GRAM STAIN: CPT | Mod: 59 | Performed by: STUDENT IN AN ORGANIZED HEALTH CARE EDUCATION/TRAINING PROGRAM

## 2024-03-15 PROCEDURE — 87186 SC STD MICRODIL/AGAR DIL: CPT | Performed by: STUDENT IN AN ORGANIZED HEALTH CARE EDUCATION/TRAINING PROGRAM

## 2024-03-15 PROCEDURE — 87070 CULTURE OTHR SPECIMN AEROBIC: CPT | Performed by: STUDENT IN AN ORGANIZED HEALTH CARE EDUCATION/TRAINING PROGRAM

## 2024-03-15 PROCEDURE — 87102 FUNGUS ISOLATION CULTURE: CPT

## 2024-03-15 PROCEDURE — 99207 PR NO CHARGE NURSE ONLY: CPT

## 2024-03-15 PROCEDURE — 87077 CULTURE AEROBIC IDENTIFY: CPT | Performed by: STUDENT IN AN ORGANIZED HEALTH CARE EDUCATION/TRAINING PROGRAM

## 2024-03-15 NOTE — TELEPHONE ENCOUNTER
[facility] Call Center    Phone Message    May a detailed message be left on voicemail: yes     Reason for Call: Other: .     Patient is wanting to get a call back. Patient states her ear is currently infected. Patient states the clinic wanted to swab her ear when her ear is infected. Patient is wanting to set up the appt for her ear to be swabbed. Please advise.     Action Taken: Message routed to:  [facility] IUD    Travel Screening: Not Applicable

## 2024-03-15 NOTE — PROGRESS NOTES
Pt in to have her ear drainage cultured. Pt reports that her ear started draining bilaterally yesterday. The pt's last use of amphotericin B powder  was 3/5/24. The pt started having pain and has been unable to use her hearing aids since 3/10/24. Ears are red and swollen on exam. PTW.

## 2024-03-17 LAB
BACTERIA WND CULT: ABNORMAL
GRAM STAIN RESULT: ABNORMAL

## 2024-03-18 ENCOUNTER — TELEPHONE (OUTPATIENT)
Dept: INFECTIOUS DISEASES | Facility: CLINIC | Age: 34
End: 2024-03-18
Payer: COMMERCIAL

## 2024-03-18 DIAGNOSIS — H66.90 BACTERIAL EAR INFECTION: Primary | ICD-10-CM

## 2024-03-18 DIAGNOSIS — B96.89 BACTERIAL EAR INFECTION: Primary | ICD-10-CM

## 2024-03-18 RX ORDER — DOXYCYCLINE 100 MG/1
100 CAPSULE ORAL 2 TIMES DAILY
Qty: 28 CAPSULE | Refills: 0 | Status: SHIPPED | OUTPATIENT
Start: 2024-03-18 | End: 2024-04-01

## 2024-03-18 NOTE — TELEPHONE ENCOUNTER
I called the pt and discussed her ear cultures, after review with the MD. Doxycycline 100 mg BID x 14 days sent. Complement Activity Total will be reviewed by the MD tomorrow.

## 2024-03-19 NOTE — TELEPHONE ENCOUNTER
I called the pt and informed of the below.    The skin of the bilateral groins was clipped, prepped and draped in the usual sterile manner. (If not otherwise specified, skin prep was bilateral.)

## 2024-03-22 ENCOUNTER — OFFICE VISIT (OUTPATIENT)
Dept: ORTHOPEDICS | Facility: CLINIC | Age: 34
End: 2024-03-22
Attending: NURSE PRACTITIONER
Payer: COMMERCIAL

## 2024-03-22 VITALS — BODY MASS INDEX: 27.16 KG/M2 | WEIGHT: 169 LBS | HEIGHT: 66 IN

## 2024-03-22 DIAGNOSIS — M25.511 CHRONIC RIGHT SHOULDER PAIN: ICD-10-CM

## 2024-03-22 DIAGNOSIS — G89.29 CHRONIC RIGHT SHOULDER PAIN: ICD-10-CM

## 2024-03-22 PROCEDURE — 99244 OFF/OP CNSLTJ NEW/EST MOD 40: CPT | Performed by: PEDIATRICS

## 2024-03-22 NOTE — PATIENT INSTRUCTIONS
We discussed these other possible diagnosis: Right shoulder with instability, likely prior subluxations. Concern for labral tear v. RC injury, will obtain MRI.    Plan:  - Today's Plan of Care:  MRI Arthrogram of the Right Shoulder - Call [phone] to schedule MRI     Discussed activity considerations and other supportive care including Ice/Heat, OTC and other topical medications as needed.    Home Exercise Program    -We also discussed other future treatment options:  Referral to Physical Therapy  Referral to Orthopedic Surgery    Follow Up: In clinic with Dr. Vega after MRI (wait at least 1-2 days)    If you have any further questions for your physician or physician s care team you can call [phone] and use option 3 to leave a voice message.

## 2024-03-22 NOTE — LETTER
3/22/2024         RE: Sherley Quijano  [address] [facility] [address]        Dear Colleague,    Thank you for referring your patient, Sherley Quijano, to the [facility] [place]. Please see a copy of my visit note below.    ASSESSMENT & PLAN    Sherley was seen today for injury.    Diagnoses and all orders for this visit:    Chronic right shoulder pain  -     Orthopedic  Referral  -     XR SHOULDER CONTRAST CT/MR INJECTION ; Future  -     MR SHOULDER ARTHROGRAM  RIGHT W CONTRAST; Future      This issue is chronic and Worsening.        ICD-10-CM    1. Chronic right shoulder pain  M25.511 Orthopedic  Referral    G89.29 XR SHOULDER CONTRAST CT/MR INJECTION      MR SHOULDER ARTHROGRAM  RIGHT W CONTRAST        Patient Instructions   We discussed these other possible diagnosis: Right shoulder with instability, likely prior subluxations. Concern for labral tear v. RC injury, will obtain MRI.    Plan:  - Today's Plan of Care:  MRI Arthrogram of the Right Shoulder - Call [phone] to schedule MRI     Discussed activity considerations and other supportive care including Ice/Heat, OTC and other topical medications as needed.    Home Exercise Program    -We also discussed other future treatment options:  Referral to Physical Therapy  Referral to Orthopedic Surgery    Follow Up: In clinic with Dr. Vega after MRI (wait at least 1-2 days)    Concerning signs and symptoms were reviewed and all questions were answered at this time.    Thanks for the opportunity to participate in the care of this patient, I will keep you updated on their progress.    CC: Cyndee Vega MD [facility]  Sports Medicine Physician  [facility]    -----  Chief Complaint   Patient presents with     Right Shoulder - Injury       SUBJECTIVE  Sherley Quijano is a/an 33 year old female who is seen in consultation at the request of Cyndee Rizzo C.N.P. for evaluation of  "right shoulder.    The patient is seen by themselves.  The patient is right handed    Onset: September 19th, 2023. Patient describes injury as falling while playing ice hockey, she fell directly on the shoulder and it dislocated.  She popped it back in and kept playing.   - She did go to a chiropractor on February 12th, 2024 and they told her it was still dislocated and popped it back in.    Location of Pain: right shoulder; rotator cuff area, can go superior along the SLAP   Worsened by: abduction, internal rotation, overhead, dressing, bathing   Better with: chiropractor, energy healing, putting pressure back on the shoulder  Treatments tried: ice, Tylenol, Aleve, other medications: muscle relaxers, home exercises, previous imaging (xray 3/6/24), chiropractic care (8 visits), and massage,   Associated symptoms: numbness, tingling, weakness of right shoulder, locking or catching, feeling of instability, and hypermobility     Went to PCP where x-rays were obtained 3/6/2024    Orthopedic/Surgical history: NO  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  CV: distal perfusion intact   Resp: normal respiratory effort without conversational dyspnea   Psych: normal mood and affect  Gait: NORMAL  Neuro: Normal light sensory exam of upper extremity    Right Shoulder exam    Inspection and Posture:       rounded shoulders and upper back    Skin:        no visible deformities    Tender:        subacromial space right    Non Tender:       remainder of shoulder right    ROM:        forward flexion 90  right       abduction 90 right       internal rotation gluteal right       external rotation 35 right    Painful motions:       flexion right       elevation right    Strength:        " abduction 5-/5 right       flexion 5-/5 right       internal rotation 5/5 right       external rotation 5/5 right    Impingement testing:       positive (+) Neer right       positive (+) Palafox right       positive (+) O'jase right    Sensation:        normal sensation over shoulder and upper extremity       RADIOLOGY:  Final results and radiologist's interpretation, available in the [facility] health record.  Images were reviewed with the patient in the office today.  My personal interpretation of the performed imaging:    Reviewed prior XRs from PCP - no acute bony abnormality, no significant degenerative change    Results for orders placed or performed in visit on 03/06/24   XR Shoulder Right 2 Views    Narrative    XR SHOULDER RIGHT 2 VIEWS 3/6/2024 10:26 AM     HISTORY: Chronic right shoulder pain; Chronic right shoulder pain    COMPARISON: None.         Impression    IMPRESSION: The right glenohumeral and acromioclavicular joints are  negative for fracture or dislocation.    ARVIN AMOR MD         SYSTEM ID:  [id number]         Review of the result(s) of each unique test - XR             Again, thank you for allowing me to participate in the care of your patient.        Sincerely,        Patty Vega MD

## 2024-03-22 NOTE — PROGRESS NOTES
ASSESSMENT & PLAN    Sherley was seen today for injury.    Diagnoses and all orders for this visit:    Chronic right shoulder pain  -     Orthopedic  Referral  -     XR SHOULDER CONTRAST CT/MR INJECTION ; Future  -     MR SHOULDER ARTHROGRAM  RIGHT W CONTRAST; Future      This issue is chronic and Worsening.        ICD-10-CM    1. Chronic right shoulder pain  M25.511 Orthopedic  Referral    G89.29 XR SHOULDER CONTRAST CT/MR INJECTION      MR SHOULDER ARTHROGRAM  RIGHT W CONTRAST        Patient Instructions   We discussed these other possible diagnosis: Right shoulder with instability, likely prior subluxations. Concern for labral tear v. RC injury, will obtain MRI.    Plan:  - Today's Plan of Care:  MRI Arthrogram of the Right Shoulder - Call [phone] to schedule MRI     Discussed activity considerations and other supportive care including Ice/Heat, OTC and other topical medications as needed.    Home Exercise Program    -We also discussed other future treatment options:  Referral to Physical Therapy  Referral to Orthopedic Surgery    Follow Up: In clinic with Dr. Vega after MRI (wait at least 1-2 days)    Concerning signs and symptoms were reviewed and all questions were answered at this time.    Thanks for the opportunity to participate in the care of this patient, I will keep you updated on their progress.    CC: Cyndee Vega MD [facility]  Sports Medicine Physician  [facility]    -----  Chief Complaint   Patient presents with    Right Shoulder - Injury       SUBJECTIVE  Sherley Quijano is a/an 33 year old female who is seen in consultation at the request of Cyndee Rizzo C.N.P. for evaluation of right shoulder.    The patient is seen by themselves.  The patient is right handed    Onset: September 19th, 2023. Patient describes injury as falling while playing ice hockey, she fell directly on the shoulder and it dislocated.  She popped it back in and  "kept playing.   - She did go to a chiropractor on February 12th, 2024 and they told her it was still dislocated and popped it back in.    Location of Pain: right shoulder; rotator cuff area, can go superior along the SLAP   Worsened by: abduction, internal rotation, overhead, dressing, bathing   Better with: chiropractor, energy healing, putting pressure back on the shoulder  Treatments tried: ice, Tylenol, Aleve, other medications: muscle relaxers, home exercises, previous imaging (xray 3/6/24), chiropractic care (8 visits), and massage,   Associated symptoms: numbness, tingling, weakness of right shoulder, locking or catching, feeling of instability, and hypermobility     Went to PCP where x-rays were obtained 3/6/2024    Orthopedic/Surgical history: NO  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  CV: distal perfusion intact   Resp: normal respiratory effort without conversational dyspnea   Psych: normal mood and affect  Gait: NORMAL  Neuro: Normal light sensory exam of upper extremity    Right Shoulder exam    Inspection and Posture:       rounded shoulders and upper back    Skin:        no visible deformities    Tender:        subacromial space right    Non Tender:       remainder of shoulder right    ROM:        forward flexion 90  right       abduction 90 right       internal rotation gluteal right       external rotation 35 right    Painful motions:       flexion right       elevation right    Strength:        abduction 5-/5 right       flexion 5-/5 right       internal rotation 5/5 right       external rotation 5/5 right    Impingement testing:       positive (+) Neer right       positive (+) Palafox right       positive (+) O'jase right    Sensation:        normal " sensation over shoulder and upper extremity       RADIOLOGY:  Final results and radiologist's interpretation, available in the [facility] health record.  Images were reviewed with the patient in the office today.  My personal interpretation of the performed imaging:    Reviewed prior XRs from PCP - no acute bony abnormality, no significant degenerative change    Results for orders placed or performed in visit on 03/06/24   XR Shoulder Right 2 Views    Narrative    XR SHOULDER RIGHT 2 VIEWS 3/6/2024 10:26 AM     HISTORY: Chronic right shoulder pain; Chronic right shoulder pain    COMPARISON: None.         Impression    IMPRESSION: The right glenohumeral and acromioclavicular joints are  negative for fracture or dislocation.    ARVIN AMOR MD         SYSTEM ID:  [id number]         Review of the result(s) of each unique test - XR

## 2024-04-04 ENCOUNTER — OFFICE VISIT (OUTPATIENT)
Dept: DERMATOLOGY | Facility: CLINIC | Age: 34
End: 2024-04-04
Payer: COMMERCIAL

## 2024-04-04 DIAGNOSIS — L40.9 PSORIASIS: Primary | ICD-10-CM

## 2024-04-04 DIAGNOSIS — H60.8X3 CHRONIC ECZEMATOUS OTITIS EXTERNA OF BOTH EARS: ICD-10-CM

## 2024-04-04 PROCEDURE — 99203 OFFICE O/P NEW LOW 30 MIN: CPT | Performed by: PHYSICIAN ASSISTANT

## 2024-04-04 RX ORDER — TACROLIMUS 1 MG/G
OINTMENT TOPICAL
Qty: 30 G | Refills: 1 | Status: SHIPPED | OUTPATIENT
Start: 2024-04-04

## 2024-04-04 RX ORDER — ROFLUMILAST 3 MG/G
1 CREAM TOPICAL DAILY
Qty: 60 G | Refills: 3 | Status: SHIPPED | OUTPATIENT
Start: 2024-04-04

## 2024-04-04 NOTE — LETTER
4/4/2024         RE: Sherley Quijano  [address] [place] [id number]        Dear Colleague,    Thank you for referring your patient, Sherley Quijano, to the [facility]. Please see a copy of my visit note below.    Sherley Quijano is an extremely pleasant 33 year old year old female patient here today for rash in ears. She notes present for the past 3 years. She has tried numerous drops and cream without resolution. Recently was placed on doxycycline since culture grew out staph. She notes the topical that seem to the most is Lotrisone.  Patient has no other skin complaints today.  Remainder of the HPI, Meds, PMH, Allergies, FH, and SH was reviewed in chart.        Past Surgical History:   Procedure Laterality Date     COLONOSCOPY N/A 11/17/2021    Procedure: COLONOSCOPY WITH BIOPSY;  Surgeon: Abel Calvert DO;  Location: [facility]     D & C  [facility]    TAB     MOUTH SURGERY  2008    wisdom teeth        Family History   Problem Relation Age of Onset     C.A.D. Maternal Grandfather         MI     Heart Disease Maternal Grandfather      Breast Cancer Maternal Grandmother      Depression Maternal Grandmother      Thyroid Disease Maternal Grandmother      Breast Cancer Paternal Grandmother      Alcohol/Drug Father         alcohol-recovered     Allergies Father      Allergies Sister      Neurologic Disorder Sister         Narcolepsy     Substance Abuse Sister         recovered drugs     Substance Abuse Mother         recovered alcohol     Depression Mother      Thyroid Disease Mother      Breast Cancer Mother      Other - See Comments Other         Heart Attack       Social History     Socioeconomic History     Marital status:      Spouse name: Not on file     Number of children: 0     Years of education: 12.5+     Highest education level: Not on file   Occupational History     Occupation:      Employer: [employer]     Occupation: [employer]      Employer: [employer]    Tobacco Use     Smoking status: Former     Packs/day: .25     Types: Cigarettes     Quit date: 10/2017     Years since quittin.5     Smokeless tobacco: Never     Tobacco comments:     using e-cig daily   Vaping Use     Vaping Use: Every day     Substances: Nicotine, Flavoring     Devices: Refillable tank   Substance and Sexual Activity     Alcohol use: Yes     Comment: twice a week     Drug use: Yes     Types: Marijuana     Sexual activity: Yes     Partners: Male     Birth control/protection: Male Surgical   Other Topics Concern     Parent/sibling w/ CABG, MI or angioplasty before 65F 55M? No   Social History Narrative    2018    ENVIRONMENTAL HISTORY: [place] The home is heated with a wood stove and radiant heat. They do not have central air conditioning. The patient's bedroom is furnished with hard sánchez in bedroom, allergen mattress cover, allergen pillowcase cover, fabric window coverings and 2 rugs.  Pets inside the house include 1 cat. There is  history of cockroach or mice infestation. There is 1 smoker in the house.  The house does not have a damp basement.     Social Determinants of Health     Financial Resource Strain: Not on file   Food Insecurity: Not on file   Transportation Needs: Not on file   Physical Activity: Not on file   Stress: Not on file   Social Connections: Not on file   Interpersonal Safety: Low Risk  (3/6/2024)    Interpersonal Safety      Do you feel physically and emotionally safe where you currently live?: Yes      Within the past 12 months, have you been hit, slapped, kicked or otherwise physically hurt by someone?: No      Within the past 12 months, have you been humiliated or emotionally abused in other ways by your partner or ex-partner?: No   Housing Stability: Not on file       Outpatient Encounter Medications as of 2024   Medication Sig Dispense Refill     Roflumilast (ZORYVE) 0.3 % CREA Externally apply 1  Application topically daily Apply daily to ears. 60 g 3     tacrolimus (PROTOPIC) 0.1 % external ointment Apply sparingly twice daily as needed to ears. 30 g 1     Amphotericin B 905 UNIT/MG POWD CSF/HC otic powder 1 puff to RIGHT ear twice daily for 3 weeks 3 g 0     budesonide (PULMICORT) 0.5 MG/2ML neb solution        cyclobenzaprine (FLEXERIL) 10 MG tablet Take 1 tablet (10 mg) by mouth 3 times daily as needed for muscle spasms 30 tablet 0     fluocinolone acetonide 0.01 % OIL 2-3 drops to affected ear every other night as needed for itching 20 mL 3     ketoconazole (NIZORAL) 2 % external cream Apply topically daily To opening of external ear 15 g 1     [] methylphenidate (RITALIN) 10 MG tablet Take 1 tablet (10 mg) by mouth 2 times daily for 30 days 30 tablet 0     methylphenidate (RITALIN) 10 MG tablet Take 1 tablet (10 mg) by mouth 2 times daily as needed 30 tablet 0     No facility-administered encounter medications on file as of 2024.             O:   NAD, WDWN, Alert & Oriented, Mood & Affect wnl, Vitals stable   Here today alone   LMP 2024 (Approximate)    General appearance normal   Vitals stable   Alert, oriented and in no acute distress      [place] scaly plaque on entrance of canal       Eyes: Conjunctivae/lids:Normal     ENT: Lips normal    MSK:Normal    Pulm: Breathing Normal    Neuro/Psych: Orientation:Alert and Orientedx3 ; Mood/Affect:normal     A/P:  1. Dermatitis-   psoriasis vs seborrheic eczema  Recommend steroid free topical.  Initially start with protopic bid.   Once we can get zoryve covered please changed to this daily.  Recheck in 3 months.       Again, thank you for allowing me to participate in the care of your patient.        Sincerely,        Lyric Callejas PA-C

## 2024-04-06 NOTE — PROGRESS NOTES
Sherley Quijano is an extremely pleasant 33 year old year old female patient here today for rash in ears. She notes present for the past 3 years. She has tried numerous drops and cream without resolution. Recently was placed on doxycycline since culture grew out staph. She notes the topical that seem to the most is Lotrisone.  Patient has no other skin complaints today.  Remainder of the HPI, Meds, PMH, Allergies, FH, and SH was reviewed in chart.        Past Surgical History:   Procedure Laterality Date    COLONOSCOPY N/A 2021    Procedure: COLONOSCOPY WITH BIOPSY;  Surgeon: Abel Calvert DO;  Location: [facility] [facility]    D & C  2006    TAB    MOUTH SURGERY      wisdom teeth        Family History   Problem Relation Age of Onset    C.A.D. Maternal Grandfather         MI    Heart Disease Maternal Grandfather     Breast Cancer Maternal Grandmother     Depression Maternal Grandmother     Thyroid Disease Maternal Grandmother     Breast Cancer Paternal Grandmother     Alcohol/Drug Father         alcohol-recovered    Allergies Father     Allergies Sister     Neurologic Disorder Sister         Narcolepsy    Substance Abuse Sister         recovered drugs    Substance Abuse Mother         recovered alcohol    Depression Mother     Thyroid Disease Mother     Breast Cancer Mother     Other - See Comments Other         Heart Attack       Social History     Socioeconomic History    Marital status:      Spouse name: Not on file    Number of children: 0    Years of education: 12.5+    Highest education level: Not on file   Occupational History    Occupation:      Employer: [employer]    Occupation: [employer]      Employer: [employer]   Tobacco Use    Smoking status: Former     Packs/day: .25     Types: Cigarettes     Quit date: 10/2017     Years since quittin.5    Smokeless tobacco: Never    Tobacco comments:     using e-cig daily   Vaping Use    Vaping Use: Every day    Substances: Nicotine,  Flavoring    Devices: Refillable tank   Substance and Sexual Activity    Alcohol use: Yes     Comment: twice a week    Drug use: Yes     Types: Marijuana    Sexual activity: Yes     Partners: Male     Birth control/protection: Male Surgical   Other Topics Concern    Parent/sibling w/ CABG, MI or angioplasty before 65F 55M? No   Social History Narrative    April 16, 2018    ENVIRONMENTAL HISTORY: [place] The home is heated with a wood stove and radiant heat. They do not have central air conditioning. The patient's bedroom is furnished with hard sánchez in bedroom, allergen mattress cover, allergen pillowcase cover, fabric window coverings and 2 rugs.  Pets inside the house include 1 cat. There is  history of cockroach or mice infestation. There is 1 smoker in the house.  The house does not have a damp basement.     Social Determinants of Health     Financial Resource Strain: Not on file   Food Insecurity: Not on file   Transportation Needs: Not on file   Physical Activity: Not on file   Stress: Not on file   Social Connections: Not on file   Interpersonal Safety: Low Risk  (3/6/2024)    Interpersonal Safety     Do you feel physically and emotionally safe where you currently live?: Yes     Within the past 12 months, have you been hit, slapped, kicked or otherwise physically hurt by someone?: No     Within the past 12 months, have you been humiliated or emotionally abused in other ways by your partner or ex-partner?: No   Housing Stability: Not on file       Outpatient Encounter Medications as of 4/4/2024   Medication Sig Dispense Refill    Roflumilast (ZORYVE) 0.3 % CREA Externally apply 1 Application topically daily Apply daily to ears. 60 g 3    tacrolimus (PROTOPIC) 0.1 % external ointment Apply sparingly twice daily as needed to ears. 30 g 1    Amphotericin B 905 UNIT/MG POWD CSF/HC otic powder 1 puff to RIGHT ear twice daily for 3 weeks 3 g 0    budesonide  (PULMICORT) 0.5 MG/2ML neb solution       cyclobenzaprine (FLEXERIL) 10 MG tablet Take 1 tablet (10 mg) by mouth 3 times daily as needed for muscle spasms 30 tablet 0    fluocinolone acetonide 0.01 % OIL 2-3 drops to affected ear every other night as needed for itching 20 mL 3    ketoconazole (NIZORAL) 2 % external cream Apply topically daily To opening of external ear 15 g 1    [] methylphenidate (RITALIN) 10 MG tablet Take 1 tablet (10 mg) by mouth 2 times daily for 30 days 30 tablet 0    methylphenidate (RITALIN) 10 MG tablet Take 1 tablet (10 mg) by mouth 2 times daily as needed 30 tablet 0     No facility-administered encounter medications on file as of 2024.             O:   NAD, WDWN, Alert & Oriented, Mood & Affect wnl, Vitals stable   Here today alone   LMP 2024 (Approximate)    General appearance normal   Vitals stable   Alert, oriented and in no acute distress      [place] scaly plaque on entrance of canal       Eyes: Conjunctivae/lids:Normal     ENT: Lips normal    MSK:Normal    Pulm: Breathing Normal    Neuro/Psych: Orientation:Alert and Orientedx3 ; Mood/Affect:normal     A/P:  1. Dermatitis-   psoriasis vs seborrheic eczema  Recommend steroid free topical.  Initially start with protopic bid.   Once we can get zoryve covered please changed to this daily.  Recheck in 3 months.

## 2024-04-12 LAB
BACTERIA WND CULT: NO GROWTH
BACTERIA WND CULT: NO GROWTH

## 2024-05-14 ENCOUNTER — OFFICE VISIT (OUTPATIENT)
Dept: AUDIOLOGY | Facility: CLINIC | Age: 34
End: 2024-05-14
Payer: COMMERCIAL

## 2024-05-14 DIAGNOSIS — H90.3 SENSORINEURAL HEARING LOSS, BILATERAL: Primary | ICD-10-CM

## 2024-05-14 PROCEDURE — V5010 ASSESSMENT FOR HEARING AID: HCPCS | Performed by: AUDIOLOGIST

## 2024-05-17 NOTE — PROGRESS NOTES
AUDIOLOGY REPORT    HEARING AID CHECK    SUBJECTIVE:Sherley Quijano is a 33 year old female who was seen in the Audiology Clinic at the [facility] on 5/14/2024  for a hearing aid check. Sherley has been seen previously on 10/27/2023, and results revealed normal hearing 250-2000 Hz sloping to severe sensorineural hearing loss in the right ear and normal hearing 250-1500 Hz sloping to moderate sensorineural hearing loss then rising back to normal from 6-8 kHz in the left ear. The patient was medically evaluated and determined to be cleared for binaural hearing aids by Dr. Bora Guajardo in ENT.     The patient has been seen previously in this clinic and was fit with binaural [employer] Lumity 30-R hearing aids on 2/15/2024.  Sherley reports she is doing well with the hearing aids when she can wear them if her ears are not irritated. She states her ears are feeling better right now. She is using a new cream for the eczema in her ears. She states it helps a little but not much.    OBJECTIVE:     Otoscopy revealed clear ear canals bilaterally with some irritation in the right ear today.    Based on patient report, the following changes were made:    Cleaned and checked both devices.   Took her to 93% of targets. She denies loudness discomfort.   She is doing well otherwise.    No charge visit today (in warranty hearing aid check).    ASSESSMENT: Hearing aid check was completed today. Changes to hearing aid was completed as outlined above.     PLAN:Sherley will return for follow-up as needed, or at least every 6-9 months for cleaning and assessment of hearing aid.   Please call this clinic with any questions regarding today s appointment.    Kelsi Kilpatrick, CCC-A  [place] Licensed Audiologist #[id number]

## 2024-05-24 ENCOUNTER — HOSPITAL ENCOUNTER (OUTPATIENT)
Dept: MRI IMAGING | Facility: CLINIC | Age: 34
Discharge: HOME | End: 2024-05-24
Attending: PEDIATRICS
Payer: COMMERCIAL

## 2024-05-24 ENCOUNTER — HOSPITAL ENCOUNTER (OUTPATIENT)
Dept: GENERAL RADIOLOGY | Facility: CLINIC | Age: 34
Discharge: HOME | End: 2024-05-24
Attending: PEDIATRICS
Payer: COMMERCIAL

## 2024-05-24 DIAGNOSIS — G89.29 CHRONIC RIGHT SHOULDER PAIN: ICD-10-CM

## 2024-05-24 DIAGNOSIS — M25.511 CHRONIC RIGHT SHOULDER PAIN: ICD-10-CM

## 2024-05-24 PROCEDURE — 250N000011 HC RX IP 250 OP 636: Performed by: PEDIATRICS

## 2024-05-24 PROCEDURE — 255N000002 HC RX 255 OP 636: Performed by: PEDIATRICS

## 2024-05-24 PROCEDURE — 23350 INJECTION FOR SHOULDER X-RAY: CPT

## 2024-05-24 PROCEDURE — 250N000009 HC RX 250: Performed by: PEDIATRICS

## 2024-05-24 PROCEDURE — 96372 THER/PROPH/DIAG INJ SC/IM: CPT | Performed by: PEDIATRICS

## 2024-05-24 PROCEDURE — 73222 MRI JOINT UPR EXTREM W/DYE: CPT | Mod: RT

## 2024-05-24 PROCEDURE — 77002 NEEDLE LOCALIZATION BY XRAY: CPT

## 2024-05-24 PROCEDURE — A9585 GADOBUTROL INJECTION: HCPCS | Performed by: PEDIATRICS

## 2024-05-24 RX ORDER — EPINEPHRINE 1 MG/ML
0.1 INJECTION, SOLUTION, CONCENTRATE INTRAVENOUS ONCE
Status: COMPLETED | OUTPATIENT
Start: 2024-05-24 | End: 2024-05-24

## 2024-05-24 RX ORDER — IOPAMIDOL 408 MG/ML
10 INJECTION, SOLUTION INTRATHECAL ONCE
Status: COMPLETED | OUTPATIENT
Start: 2024-05-24 | End: 2024-05-24

## 2024-05-24 RX ORDER — GADOBUTROL 604.72 MG/ML
0.1 INJECTION INTRAVENOUS ONCE
Status: COMPLETED | OUTPATIENT
Start: 2024-05-24 | End: 2024-05-24

## 2024-05-24 RX ORDER — BUPIVACAINE HYDROCHLORIDE 2.5 MG/ML
7 INJECTION, SOLUTION INFILTRATION; PERINEURAL ONCE
Status: COMPLETED | OUTPATIENT
Start: 2024-05-24 | End: 2024-05-24

## 2024-05-24 RX ADMIN — IOPAMIDOL 10 ML: 408 INJECTION, SOLUTION INTRATHECAL at 13:41

## 2024-05-24 RX ADMIN — GADOBUTROL 0.05 ML: 604.72 INJECTION INTRAVENOUS at 13:43

## 2024-05-24 RX ADMIN — BUPIVACAINE HYDROCHLORIDE 7 ML: 2.5 INJECTION, SOLUTION EPIDURAL; INFILTRATION; INTRACAUDAL; PERINEURAL at 13:43

## 2024-05-24 RX ADMIN — LIDOCAINE HYDROCHLORIDE 5 ML: 10 INJECTION, SOLUTION EPIDURAL; INFILTRATION; INTRACAUDAL; PERINEURAL at 13:40

## 2024-05-24 RX ADMIN — IOPAMIDOL 3 ML: 408 INJECTION, SOLUTION INTRATHECAL at 13:41

## 2024-05-24 RX ADMIN — EPINEPHRINE 0.1 MG: 1 INJECTION, SOLUTION, CONCENTRATE INTRAVENOUS at 13:43

## 2024-05-24 NOTE — RESULT ENCOUNTER NOTE
Reviewed and notified of results via [employer].  Florencio Lepe,  Please see the results of your MRI.  Given the labral tearing and loose body, if you'd like to follow up with a shoulder specialist (orthopedic surgeon) let us know and we can place this referral. Otherwise, we will review this in more detail at your follow-up appointment. Let us know if you have questions.    Patty Vega MD, CAQ  Primary Care Sports Medicine  [facility]

## 2024-05-30 ENCOUNTER — OFFICE VISIT (OUTPATIENT)
Dept: DERMATOLOGY | Facility: CLINIC | Age: 34
End: 2024-05-30
Payer: COMMERCIAL

## 2024-05-30 DIAGNOSIS — L91.0 KELOID: ICD-10-CM

## 2024-05-30 DIAGNOSIS — L40.9 PSORIASIS: Primary | ICD-10-CM

## 2024-05-30 PROCEDURE — 99213 OFFICE O/P EST LOW 20 MIN: CPT | Performed by: PHYSICIAN ASSISTANT

## 2024-05-30 RX ORDER — CLOBETASOL PROPIONATE 0.5 MG/G
CREAM TOPICAL
Qty: 30 G | Refills: 2 | Status: SHIPPED | OUTPATIENT
Start: 2024-05-30

## 2024-05-30 RX ORDER — CLOBETASOL PROPIONATE 0.5 MG/G
CREAM TOPICAL 2 TIMES DAILY
Qty: 30 G | Refills: 2 | Status: SHIPPED | OUTPATIENT
Start: 2024-05-30 | End: 2024-05-30

## 2024-05-30 ASSESSMENT — PAIN SCALES - GENERAL: PAINLEVEL: NO PAIN (0)

## 2024-05-30 NOTE — PROGRESS NOTES
Sherley Quijano is an extremely pleasant 33 year old year old female patient here today for recheck rash in ears. She notes improved with zoryve but needs a topical to help with dampen flares quicker. She also has keloid on her chest, has had are injected numerous times an outside clinic but continue to return.   Patient has no other skin complaints today.  Remainder of the HPI, Meds, PMH, Allergies, FH, and SH was reviewed in chart.        Past Surgical History:   Procedure Laterality Date    COLONOSCOPY N/A 2021    Procedure: COLONOSCOPY WITH BIOPSY;  Surgeon: Abel Calvert DO;  Location: [facility]    D & C  2006    TAB    MOUTH SURGERY  2008    wisdom teeth        Family History   Problem Relation Age of Onset    C.A.D. Maternal Grandfather         MI    Heart Disease Maternal Grandfather     Breast Cancer Maternal Grandmother     Depression Maternal Grandmother     Thyroid Disease Maternal Grandmother     Breast Cancer Paternal Grandmother     Alcohol/Drug Father         alcohol-recovered    Allergies Father     Allergies Sister     Neurologic Disorder Sister         Narcolepsy    Substance Abuse Sister         recovered drugs    Substance Abuse Mother         recovered alcohol    Depression Mother     Thyroid Disease Mother     Breast Cancer Mother     Other - See Comments Other         Heart Attack       Social History     Socioeconomic History    Marital status:      Spouse name: Not on file    Number of children: 0    Years of education: 12.5+    Highest education level: Not on file   Occupational History    Occupation:      Employer: [employer]    Occupation: [email or web address]      Employer: [employer]   Tobacco Use    Smoking status: Former     Current packs/day: 0.00     Types: Cigarettes     Quit date: 10/2017     Years since quittin.6    Smokeless tobacco: Never    Tobacco comments:     using e-cig daily   Vaping Use    Vaping status: Every Day    Substances: Nicotine,  Flavoring    Devices: Refillable tank   Substance and Sexual Activity    Alcohol use: Yes     Comment: twice a week    Drug use: Yes     Types: Marijuana    Sexual activity: Yes     Partners: Male     Birth control/protection: Male Surgical   Other Topics Concern    Parent/sibling w/ CABG, MI or angioplasty before 65F 55M? No   Social History Narrative    April 16, 2018    ENVIRONMENTAL HISTORY: [place] The home is heated with a wood stove and radiant heat. They do not have central air conditioning. The patient's bedroom is furnished with hard sánchez in bedroom, allergen mattress cover, allergen pillowcase cover, fabric window coverings and 2 rugs.  Pets inside the house include 1 cat. There is  history of cockroach or mice infestation. There is 1 smoker in the house.  The house does not have a damp basement.     Social Determinants of Health     Financial Resource Strain: High Risk (1/1/2022)    [facility], [facility]    Financial Resource Strain     Difficulty of Paying Living Expenses: Not on file     Difficulty of Paying Living Expenses: Not on file   Food Insecurity: Not on file   Transportation Needs: Not on file   Physical Activity: Not on file   Stress: Not on file   Social Connections: Unknown (1/1/2022)    [facility], [facility]    Social Connections     Frequency of Communication with Friends and Family: Not on file   Interpersonal Safety: Low Risk  (3/6/2024)    Interpersonal Safety     Do you feel physically and emotionally safe where you currently live?: Yes     Within the past 12 months, have you been hit, slapped, kicked or otherwise physically hurt by someone?: No     Within the past 12 months, have you been humiliated or emotionally abused in other ways by your partner or ex-partner?:  No   Housing Stability: Not on file       Outpatient Encounter Medications as of 5/30/2024   Medication Sig Dispense Refill    clobetasol propionate (TEMOVATE) 0.05 % external cream Apply once to three times weekly to ears. 30 g 2    Amphotericin B 905 UNIT/MG POWD CSF/HC otic powder 1 puff to RIGHT ear twice daily for 3 weeks 3 g 0    budesonide (PULMICORT) 0.5 MG/2ML neb solution       cyclobenzaprine (FLEXERIL) 10 MG tablet Take 1 tablet (10 mg) by mouth 3 times daily as needed for muscle spasms 30 tablet 0    fluocinolone acetonide 0.01 % OIL 2-3 drops to affected ear every other night as needed for itching 20 mL 3    ketoconazole (NIZORAL) 2 % external cream Apply topically daily To opening of external ear 15 g 1    methylphenidate (RITALIN) 10 MG tablet Take 1 tablet (10 mg) by mouth 2 times daily as needed 30 tablet 0    Roflumilast (ZORYVE) 0.3 % CREA Externally apply 1 Application topically daily Apply daily to ears. 60 g 3    tacrolimus (PROTOPIC) 0.1 % external ointment Apply sparingly twice daily as needed to ears. 30 g 1    [DISCONTINUED] clobetasol propionate (TEMOVATE) 0.05 % external cream Apply topically 2 times daily Apply once to twice weekly to ears. 30 g 2     No facility-administered encounter medications on file as of 5/30/2024.             O:   NAD, WDWN, Alert & Oriented, Mood & Affect wnl, Vitals stable   Here today alone   There were no vitals taken for this visit.   General appearance normal   Vitals stable   Alert, oriented and in no acute distress      [place] scaly plaque on entrance of canal    Keloid on chest       Eyes: Conjunctivae/lids:Normal     ENT: Lips normal    MSK:Normal    Pulm: Breathing Normal    Neuro/Psych: Orientation:Alert and Orientedx3 ; Mood/Affect:normal     A/P:  1. Dermatitis-   psoriasis vs seborrheic eczema  Recommend steroid free topical.  Apply clobetasol 1-3 times weekly. Alternate with zoryve.   2. Keloid on chest   She has had numerous steroid injections at  outside clinic with out success.   Discussed excision can cause larger scar.   Will check with Dr. Suarez on what he recommends.

## 2024-05-30 NOTE — NURSING NOTE
Chief Complaint   Patient presents with    Derm Problem     Follow up ears, right still keeps getting re infected,        There were no vitals filed for this visit.  Wt Readings from Last 1 Encounters:   03/22/24 76.7 kg (169 lb)       Latia Mckeon LPN .................5/30/2024

## 2024-05-30 NOTE — LETTER
5/30/2024         RE: Sherley Quijano  [address] [facility] [address]        Dear Colleague,    Thank you for referring your patient, Sherley Quijano, to the [facility]. Please see a copy of my visit note below.    Sherley Quijano is an extremely pleasant 33 year old year old female patient here today for recheck rash in ears. She notes improved with zoryve but needs a topical to help with dampen flares quicker. She also has keloid on her chest, has had are injected numerous times an outside clinic but continue to return.   Patient has no other skin complaints today.  Remainder of the HPI, Meds, PMH, Allergies, FH, and SH was reviewed in chart.        Past Surgical History:   Procedure Laterality Date     COLONOSCOPY N/A 11/17/2021    Procedure: COLONOSCOPY WITH BIOPSY;  Surgeon: Abel Calvert DO;  Location: [facility]     D & C  [facility]    TAB     MOUTH SURGERY  2008    wisdom teeth        Family History   Problem Relation Age of Onset     C.A.D. Maternal Grandfather         MI     Heart Disease Maternal Grandfather      Breast Cancer Maternal Grandmother      Depression Maternal Grandmother      Thyroid Disease Maternal Grandmother      Breast Cancer Paternal Grandmother      Alcohol/Drug Father         alcohol-recovered     Allergies Father      Allergies Sister      Neurologic Disorder Sister         Narcolepsy     Substance Abuse Sister         recovered drugs     Substance Abuse Mother         recovered alcohol     Depression Mother      Thyroid Disease Mother      Breast Cancer Mother      Other - See Comments Other         Heart Attack       Social History     Socioeconomic History     Marital status:      Spouse name: Not on file     Number of children: 0     Years of education: 12.5+     Highest education level: Not on file   Occupational History     Occupation:      Employer: [employer]     Occupation: [employer]      Employer: [employer]   Tobacco Use      Smoking status: Former     Current packs/day: 0.00     Types: Cigarettes     Quit date: 10/2017     Years since quittin.6     Smokeless tobacco: Never     Tobacco comments:     using e-cig daily   Vaping Use     Vaping status: Every Day     Substances: Nicotine, Flavoring     Devices: Refillable tank   Substance and Sexual Activity     Alcohol use: Yes     Comment: twice a week     Drug use: Yes     Types: Marijuana     Sexual activity: Yes     Partners: Male     Birth control/protection: Male Surgical   Other Topics Concern     Parent/sibling w/ CABG, MI or angioplasty before 65F 55M? No   Social History Narrative    2018    ENVIRONMENTAL HISTORY: [place] The home is heated with a wood stove and radiant heat. They do not have central air conditioning. The patient's bedroom is furnished with hard sánchez in bedroom, allergen mattress cover, allergen pillowcase cover, fabric window coverings and 2 rugs.  Pets inside the house include 1 cat. There is  history of cockroach or mice infestation. There is 1 smoker in the house.  The house does not have a damp basement.     Social Determinants of Health     Financial Resource Strain: High Risk (2022)    [facility], [facility]    Financial Resource Strain      Difficulty of Paying Living Expenses: Not on file      Difficulty of Paying Living Expenses: Not on file   Food Insecurity: Not on file   Transportation Needs: Not on file   Physical Activity: Not on file   Stress: Not on file   Social Connections: Unknown (2022)    [facility]    Social Connections      Frequency of Communication with Friends and Family: Not on file   Interpersonal Safety: Low Risk  (3/6/2024)    Interpersonal Safety      Do you feel physically and  emotionally safe where you currently live?: Yes      Within the past 12 months, have you been hit, slapped, kicked or otherwise physically hurt by someone?: No      Within the past 12 months, have you been humiliated or emotionally abused in other ways by your partner or ex-partner?: No   Housing Stability: Not on file       Outpatient Encounter Medications as of 5/30/2024   Medication Sig Dispense Refill     clobetasol propionate (TEMOVATE) 0.05 % external cream Apply once to three times weekly to ears. 30 g 2     Amphotericin B 905 UNIT/MG POWD CSF/HC otic powder 1 puff to RIGHT ear twice daily for 3 weeks 3 g 0     budesonide (PULMICORT) 0.5 MG/2ML neb solution        cyclobenzaprine (FLEXERIL) 10 MG tablet Take 1 tablet (10 mg) by mouth 3 times daily as needed for muscle spasms 30 tablet 0     fluocinolone acetonide 0.01 % OIL 2-3 drops to affected ear every other night as needed for itching 20 mL 3     ketoconazole (NIZORAL) 2 % external cream Apply topically daily To opening of external ear 15 g 1     methylphenidate (RITALIN) 10 MG tablet Take 1 tablet (10 mg) by mouth 2 times daily as needed 30 tablet 0     Roflumilast (ZORYVE) 0.3 % CREA Externally apply 1 Application topically daily Apply daily to ears. 60 g 3     tacrolimus (PROTOPIC) 0.1 % external ointment Apply sparingly twice daily as needed to ears. 30 g 1     [DISCONTINUED] clobetasol propionate (TEMOVATE) 0.05 % external cream Apply topically 2 times daily Apply once to twice weekly to ears. 30 g 2     No facility-administered encounter medications on file as of 5/30/2024.             O:   NAD, WDWN, Alert & Oriented, Mood & Affect wnl, Vitals stable   Here today alone   There were no vitals taken for this visit.   General appearance normal   Vitals stable   Alert, oriented and in no acute distress      [place] scaly plaque on entrance of canal    Keloid on chest       Eyes: Conjunctivae/lids:Normal     ENT: Lips normal    MSK:Normal    Pulm:  Breathing Normal    Neuro/Psych: Orientation:Alert and Orientedx3 ; Mood/Affect:normal     A/P:  1. Dermatitis-   psoriasis vs seborrheic eczema  Recommend steroid free topical.  Apply clobetasol 1-3 times weekly. Alternate with zoryve.   2. Keloid on chest   She has had numerous steroid injections at outside clinic with out success.   Discussed excision can cause larger scar.   Will check with Dr. Suarez on what he recommends.       Again, thank you for allowing me to participate in the care of your patient.        Sincerely,        Lyric Callejas PA-C

## 2024-05-30 NOTE — Clinical Note
Florencio Suarez, Patient has keloid on chest, has had numerous steroid injection. She wonder if there is any good laser treatment for keloid, do you think our co2 laser would help this?

## 2024-05-30 NOTE — Clinical Note
Florencio Suarez  Patient has history of keloid on chest, picture in chart. She notes previous intralesional steroid injection on helped a little and continue to return (outside clinic). She is interested in a laser treatment. I don't remember our laser  talking about co2 for keloids, what are your thoughts? Would you send to [facility] skin for last for keloid? Thanks!

## 2024-06-14 ENCOUNTER — OFFICE VISIT (OUTPATIENT)
Dept: ORTHOPEDICS | Facility: CLINIC | Age: 34
End: 2024-06-14
Payer: COMMERCIAL

## 2024-06-14 VITALS — HEIGHT: 66 IN | BODY MASS INDEX: 28.12 KG/M2 | WEIGHT: 175 LBS

## 2024-06-14 DIAGNOSIS — G89.29 CHRONIC RIGHT SHOULDER PAIN: Primary | ICD-10-CM

## 2024-06-14 DIAGNOSIS — S43.431D TEAR OF RIGHT GLENOID LABRUM, SUBSEQUENT ENCOUNTER: ICD-10-CM

## 2024-06-14 DIAGNOSIS — M25.511 CHRONIC RIGHT SHOULDER PAIN: Primary | ICD-10-CM

## 2024-06-14 PROCEDURE — 99213 OFFICE O/P EST LOW 20 MIN: CPT | Performed by: PEDIATRICS

## 2024-06-14 NOTE — PROGRESS NOTES
ASSESSMENT & PLAN    Sherley was seen today for injury, follow up and results.    Diagnoses and all orders for this visit:    Chronic right shoulder pain  -     Physical Therapy  Referral; Future    Tear of right glenoid labrum, subsequent encounter  -     Physical Therapy  Referral; Future      This issue is acute on chronic and Unchanged.    ICD-10-CM    1. Chronic right shoulder pain  M25.511 Physical Therapy  Referral    G89.29       2. Tear of right glenoid labrum, subsequent encounter  S43.431D Physical Therapy  Referral        Patient Instructions   We discussed these other possible diagnosis: Labral tear, loose body after likely dislocation episodes  We discussed the following treatment options: symptom treatment, activity modification/rest, injection, rehab and referral. Following discussion, plan: will start with physical therapy.    Plan:  - Today's Plan of Care:  Rehab: Physical Therapy: [facility] - [phone]  Discussed activity considerations and other supportive care including Ice/Heat, OTC and other topical medications as needed.    -We also discussed other future treatment options:  Referral to Shoulder Surgeon  Referral for US guided right  shoulder injection    Follow Up: 6 - 8 weeks    Concerning signs and symptoms were reviewed and all questions were answered at this time.    Patty Vega MD [facility]  Sports Medicine Physician  [facility]    SUBJECTIVE- Interim History June 14, 2024    Chief Complaint   Patient presents with    Right Shoulder - Injury, Follow Up, Results       Sherley Quijano is a 33 year old female who is seen in f/u up for    Chronic right shoulder pain  Tear of right glenoid labrum, subsequent encounter.  Since last visit on 3/22/24, patient has continued moderate-severe pressure type pain over deep lateral shoulder, glenohumeral joint region.  Patient has been doing HEP with minimal improvement.  Here to further  "review results of right shoulder arthrogram completed on 5/24/24.  - Now ~ 9 months from initial onset    Worsened by: abduction, internal rotation, overhead, dressing, bathing   Better with: chiropractor, energy healing, putting pressure back on the shoulder  Treatments tried: ice, Tylenol, Aleve, other medications: muscle relaxers, home exercises, previous imaging (xray 3/6/24), chiropractic care (8 visits), and massage  Associated symptoms: numbness, tingling, weakness of right shoulder, locking or catching, feeling of instability, and hypermobility     The patient is seen by themselves.  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  CV: distal perfusion intact   Resp: normal respiratory effort without conversational dyspnea   Psych: normal mood and affect  Gait: NORMAL  Neuro: Normal light sensory exam of upper extremity     Right Shoulder exam  Inspection and Posture:       rounded shoulders and upper back     Skin:        no visible deformities     Tender:        subacromial space right     Non Tender:       remainder of shoulder right     ROM:        forward flexion 130  right       abduction 130 right       internal rotation gluteal right       external rotation 35 right     Painful motions:       flexion right       elevation right     Strength:        abduction 5-/5 right       flexion 5-/5 right       internal rotation 5/5 right       external rotation 5/5 right     Impingement testing:       positive (+) Neer right       positive (+) Palafox right       positive (+) O'jase right     Sensation:        normal sensation over shoulder and upper extremity    RADIOLOGY:  Final results and radiologist's interpretation, available in the Epic " health record.  Images were reviewed with the patient in the office today.  My personal interpretation of the performed imaging:     Reviewed MRI right shoulder arthrogram - extensive labral tearing, cartilage defect, RC intact, small loose body      Results for orders placed or performed during the hospital encounter of 05/24/24   MR SHOULDER ARTHROGRAM  RIGHT W CONTRAST    Narrative    MR RIGHT SHOULDER ARTHROGRAM WITH CONTRAST  5/24/2024 2:44 PM    INDICATION: Evaluate labral tear. Chronic right shoulder pain.    COMPARISON: Shoulder radiographs 3/6/2024.    CONTRAST: Gadavist    TECHNIQUE: MR arthrogram protocol, obtained after administration of  dilute gadolinium solution into the glenohumeral joint.    FINDINGS:  ROTATOR CUFF:   Supraspinatus tendon intact. No tendinopathy.  Infraspinatus tendon intact. No tendinopathy.  Subscapularis tendon intact. No tendinopathy.  Teres minor tendon intact. No tendinopathy.  No rotator cuff muscle atrophy or fatty infiltration.    CORACOACROMIAL ARCH: Type I acromion. No subacromial spurring. No  superior subluxation of the humeral head. No fluid in the subacromial  subdeltoid bursa.    ACROMIOCLAVICULAR JOINT: Normal alignment of the acromioclavicular  joint.     BICEPS TENDON: Proximal long head biceps tendon intact. No  tendinopathy or tenosynovitis. No subluxation.    GLENOHUMERAL JOINT AND LABRUM: Sublabral sulcus at the long head  biceps origin (series 7 image 12). Extensive tearing of the glenoid  labrum at the labral cartilaginous junction beginning just posterior  of the previous sublabral sulcus and involving the entirety of the  posterior labrum extending from the 11:00 to the 6:00 position (series  3 images 8 through 16). Diminutive appearance of the anterior inferior  labrum with fraying of the free edge (series 3 and 4 image 17), and a  partial-thickness tear at the labral cartilaginous junction at the  3:00 position (series 3 image 13). No paralabral  cyst.    Focal full-thickness cartilage defect involving a 0.4 x 0.4 cm area of  the posterior and inferior glenoid (series 9 image 10 and series 4  image 14). Moderate to advanced chondromalacia of the central glenoid  with subchondral cystic change. Articular cartilage of the humeral  head is preserved. Intra-articular osteochondral body within the  superior subscapularis recess measuring 0.7 cm in diameter (series 4  image 10 and series 8 image 16).    Glenohumeral ligaments are intact.    BONES: No fracture or bone contusion.     SOFT TISSUES: Deltoid muscle bulk is normal. Visualized axilla and  chest wall are negative.      Impression    IMPRESSION:  1.  Extensive tearing of the glenoid labrum at the labral  cartilaginous junction extending from the 11:00 to the 6:00 position.  No paralabral cyst.  2.  Diminutive appearance of the anteroinferior labrum with fraying of  the free edge and a partial-thickness tear at the labral cartilaginous  junction.  3.  Full-thickness cartilage defect of the posteroinferior glenoid  fossa with moderate to advanced chondromalacia in the central glenoid  fossa.  4.  Intact rotator cuff.  5.  Small intra-articular osteochondral body.    NOEL LITTLE DO         SYSTEM ID:  [id number]           Review of the result(s) of each unique test - MRI

## 2024-06-14 NOTE — PATIENT INSTRUCTIONS
We discussed these other possible diagnosis: Labral tear, loose body after likely dislocation episodes  We discussed the following treatment options: symptom treatment, activity modification/rest, injection, rehab and referral. Following discussion, plan: will start with physical therapy.    Plan:  - Today's Plan of Care:  Rehab: Physical Therapy: [facility] - [phone]  Discussed activity considerations and other supportive care including Ice/Heat, OTC and other topical medications as needed.    -We also discussed other future treatment options:  Referral to Shoulder Surgeon  Referral for US guided right  shoulder injection    Follow Up: 6 - 8 weeks    If you have any further questions for your physician or physician s care team you can call [phone].

## 2024-06-14 NOTE — LETTER
6/14/2024      Sherley Quijano  [address] [place] [id number]      Dear Colleague,    Thank you for referring your patient, Sherley Quijano, to the [facility] [place]. Please see a copy of my visit note below.    ASSESSMENT & PLAN    Sherley was seen today for injury, follow up and results.    Diagnoses and all orders for this visit:    Chronic right shoulder pain  -     Physical Therapy  Referral; Future    Tear of right glenoid labrum, subsequent encounter  -     Physical Therapy  Referral; Future      This issue is acute on chronic and Unchanged.    ICD-10-CM    1. Chronic right shoulder pain  M25.511 Physical Therapy  Referral    G89.29       2. Tear of right glenoid labrum, subsequent encounter  S43.431D Physical Therapy  Referral        Patient Instructions   We discussed these other possible diagnosis: Labral tear, loose body after likely dislocation episodes  We discussed the following treatment options: symptom treatment, activity modification/rest, injection, rehab and referral. Following discussion, plan: will start with physical therapy.    Plan:  - Today's Plan of Care:  Rehab: Physical Therapy: [facility] - [phone]  Discussed activity considerations and other supportive care including Ice/Heat, OTC and other topical medications as needed.    -We also discussed other future treatment options:  Referral to Shoulder Surgeon  Referral for US guided right  shoulder injection    Follow Up: 6 - 8 weeks    Concerning signs and symptoms were reviewed and all questions were answered at this time.    Patty Vega MD [facility]  Sports Medicine Physician  [facility]    SUBJECTIVE- Interim History June 14, 2024    Chief Complaint   Patient presents with     Right Shoulder - Injury, Follow Up, Results       Sherley Quijano is a 33 year old female who is seen in f/u up for    Chronic right shoulder pain  Tear of right glenoid  "labrum, subsequent encounter.  Since last visit on 3/22/24, patient has continued moderate-severe pressure type pain over deep lateral shoulder, glenohumeral joint region.  Patient has been doing HEP with minimal improvement.  Here to further review results of right shoulder arthrogram completed on 5/24/24.  - Now ~ 9 months from initial onset    Worsened by: abduction, internal rotation, overhead, dressing, bathing   Better with: chiropractor, energy healing, putting pressure back on the shoulder  Treatments tried: ice, Tylenol, Aleve, other medications: muscle relaxers, home exercises, previous imaging (xray 3/6/24), chiropractic care (8 visits), and massage  Associated symptoms: numbness, tingling, weakness of right shoulder, locking or catching, feeling of instability, and hypermobility     The patient is seen by themselves.  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  CV: distal perfusion intact   Resp: normal respiratory effort without conversational dyspnea   Psych: normal mood and affect  Gait: NORMAL  Neuro: Normal light sensory exam of upper extremity     Right Shoulder exam  Inspection and Posture:       rounded shoulders and upper back     Skin:        no visible deformities     Tender:        subacromial space right     Non Tender:       remainder of shoulder right     ROM:        forward flexion 130  right       abduction 130 right       internal rotation gluteal right       external rotation 35 right     Painful motions:       flexion right       elevation right     Strength:        abduction 5-/5 right       flexion 5-/5 right       internal rotation 5/5 right       external rotation 5/5 right     Impingement testing:       " positive (+) Neer right       positive (+) Palafox right       positive (+) O'jase right     Sensation:        normal sensation over shoulder and upper extremity    RADIOLOGY:  Final results and radiologist's interpretation, available in the [facility] health record.  Images were reviewed with the patient in the office today.  My personal interpretation of the performed imaging:     Reviewed MRI right shoulder arthrogram - extensive labral tearing, cartilage defect, RC intact, small loose body      Results for orders placed or performed during the hospital encounter of 05/24/24   MR SHOULDER ARTHROGRAM  RIGHT W CONTRAST    Narrative    MR RIGHT SHOULDER ARTHROGRAM WITH CONTRAST  5/24/2024 2:44 PM    INDICATION: Evaluate labral tear. Chronic right shoulder pain.    COMPARISON: Shoulder radiographs 3/6/2024.    CONTRAST: Gadavist    TECHNIQUE: MR arthrogram protocol, obtained after administration of  dilute gadolinium solution into the glenohumeral joint.    FINDINGS:  ROTATOR CUFF:   Supraspinatus tendon intact. No tendinopathy.  Infraspinatus tendon intact. No tendinopathy.  Subscapularis tendon intact. No tendinopathy.  Teres minor tendon intact. No tendinopathy.  No rotator cuff muscle atrophy or fatty infiltration.    CORACOACROMIAL ARCH: Type I acromion. No subacromial spurring. No  superior subluxation of the humeral head. No fluid in the subacromial  subdeltoid bursa.    ACROMIOCLAVICULAR JOINT: Normal alignment of the acromioclavicular  joint.     BICEPS TENDON: Proximal long head biceps tendon intact. No  tendinopathy or tenosynovitis. No subluxation.    GLENOHUMERAL JOINT AND LABRUM: Sublabral sulcus at the long head  biceps origin (series 7 image 12). Extensive tearing of the glenoid  labrum at the labral cartilaginous junction beginning just posterior  of the previous sublabral sulcus and involving the entirety of the  posterior labrum extending from the 11:00 to the 6:00 position (series  3 images 8 through  16). Diminutive appearance of the anterior inferior  labrum with fraying of the free edge (series 3 and 4 image 17), and a  partial-thickness tear at the labral cartilaginous junction at the  3:00 position (series 3 image 13). No paralabral cyst.    Focal full-thickness cartilage defect involving a 0.4 x 0.4 cm area of  the posterior and inferior glenoid (series 9 image 10 and series 4  image 14). Moderate to advanced chondromalacia of the central glenoid  with subchondral cystic change. Articular cartilage of the humeral  head is preserved. Intra-articular osteochondral body within the  superior subscapularis recess measuring 0.7 cm in diameter (series 4  image 10 and series 8 image 16).    Glenohumeral ligaments are intact.    BONES: No fracture or bone contusion.     SOFT TISSUES: Deltoid muscle bulk is normal. Visualized axilla and  chest wall are negative.      Impression    IMPRESSION:  1.  Extensive tearing of the glenoid labrum at the labral  cartilaginous junction extending from the 11:00 to the 6:00 position.  No paralabral cyst.  2.  Diminutive appearance of the anteroinferior labrum with fraying of  the free edge and a partial-thickness tear at the labral cartilaginous  junction.  3.  Full-thickness cartilage defect of the posteroinferior glenoid  fossa with moderate to advanced chondromalacia in the central glenoid  fossa.  4.  Intact rotator cuff.  5.  Small intra-articular osteochondral body.    NOEL LITTLE DO         SYSTEM ID:  [id number]           Review of the result(s) of each unique test - MRI             Again, thank you for allowing me to participate in the care of your patient.        Sincerely,        Patty Vega MD

## 2025-02-01 ENCOUNTER — HEALTH MAINTENANCE LETTER (OUTPATIENT)
Age: 35
End: 2025-02-01

## (undated) RX ORDER — LIDOCAINE HYDROCHLORIDE 10 MG/ML
INJECTION, SOLUTION EPIDURAL; INFILTRATION; INTRACAUDAL; PERINEURAL
Status: DISPENSED
Start: 2024-05-24

## (undated) RX ORDER — EPINEPHRINE 1 MG/ML
INJECTION, SOLUTION, CONCENTRATE INTRAVENOUS
Status: DISPENSED
Start: 2024-05-24

## (undated) RX ORDER — BUPIVACAINE HYDROCHLORIDE 2.5 MG/ML
INJECTION, SOLUTION EPIDURAL; INFILTRATION; INTRACAUDAL
Status: DISPENSED
Start: 2024-05-24